# Patient Record
Sex: FEMALE | Race: WHITE | Employment: OTHER | URBAN - METROPOLITAN AREA
[De-identification: names, ages, dates, MRNs, and addresses within clinical notes are randomized per-mention and may not be internally consistent; named-entity substitution may affect disease eponyms.]

---

## 2017-02-14 ENCOUNTER — HOSPITAL ENCOUNTER (OUTPATIENT)
Dept: LAB | Age: 77
Discharge: HOME | End: 2017-02-14

## 2017-02-14 LAB
FLUAV AG NPH QL IA: NEGATIVE
FLUBV AG NPH QL IA: NEGATIVE

## 2017-02-14 PROCEDURE — 87804 INFLUENZA ASSAY W/OPTIC: CPT | Performed by: FAMILY MEDICINE

## 2017-04-05 PROBLEM — E03.9 HYPOACTIVE THYROID: Status: ACTIVE | Noted: 2017-04-05

## 2017-04-05 PROBLEM — E11.42 DIABETIC POLYNEUROPATHY (HCC): Status: ACTIVE | Noted: 2017-02-01

## 2017-04-05 PROBLEM — K21.9 GASTROESOPHAGEAL REFLUX DISEASE: Status: ACTIVE | Noted: 2017-04-05

## 2017-04-05 PROBLEM — N39.0 ESCHERICHIA COLI URINARY TRACT INFECTION: Status: ACTIVE | Noted: 2017-02-01

## 2017-04-05 PROBLEM — G89.4 CHRONIC PAIN ASSOCIATED WITH SIGNIFICANT PSYCHOSOCIAL DYSFUNCTION: Status: ACTIVE | Noted: 2017-01-23

## 2017-04-05 PROBLEM — M15.0 PRIMARY GENERALIZED HYPERTROPHIC OSTEOARTHROSIS: Status: ACTIVE | Noted: 2017-01-23

## 2017-04-05 PROBLEM — E66.9 ADIPOSITY: Status: ACTIVE | Noted: 2017-04-05

## 2017-04-05 PROBLEM — E11.9 TYPE 2 DIABETES MELLITUS (HCC): Status: ACTIVE | Noted: 2017-04-05

## 2017-04-05 PROBLEM — D64.9 CHRONIC ANEMIA: Status: ACTIVE | Noted: 2017-04-05

## 2017-04-05 PROBLEM — E87.6 HYPOKALEMIA: Status: ACTIVE | Noted: 2017-02-01

## 2017-04-05 PROBLEM — L89.92 PRESSURE ULCER, STAGE 2 (HCC): Status: ACTIVE | Noted: 2017-02-03

## 2017-04-05 PROBLEM — E83.42 HYPOMAGNESEMIA: Status: ACTIVE | Noted: 2017-02-01

## 2017-04-05 PROBLEM — B96.20 ESCHERICHIA COLI URINARY TRACT INFECTION: Status: ACTIVE | Noted: 2017-02-01

## 2017-05-04 PROBLEM — I10 ESSENTIAL HYPERTENSION: Status: ACTIVE | Noted: 2017-05-04

## 2017-05-04 PROBLEM — R79.89 ELEVATED BRAIN NATRIURETIC PEPTIDE (BNP) LEVEL: Status: ACTIVE | Noted: 2017-05-04

## 2017-05-04 PROBLEM — I44.7 LBBB (LEFT BUNDLE BRANCH BLOCK): Status: ACTIVE | Noted: 2017-05-04

## 2017-05-31 PROBLEM — I11.9 LVH (LEFT VENTRICULAR HYPERTROPHY) DUE TO HYPERTENSIVE DISEASE: Status: ACTIVE | Noted: 2017-05-31

## 2017-05-31 PROBLEM — I25.10 CORONARY ARTERY DISEASE INVOLVING NATIVE CORONARY ARTERY OF NATIVE HEART WITHOUT ANGINA PECTORIS: Status: ACTIVE | Noted: 2017-05-31

## 2017-10-02 PROBLEM — E78.2 HYPERLIPEMIA, MIXED: Status: ACTIVE | Noted: 2017-10-02

## 2018-07-26 PROBLEM — J40 BRONCHITIS: Status: ACTIVE | Noted: 2018-07-26

## 2019-04-30 PROBLEM — E66.01 CLASS 2 SEVERE OBESITY DUE TO EXCESS CALORIES WITH SERIOUS COMORBIDITY IN ADULT (HCC): Status: ACTIVE | Noted: 2017-04-05

## 2021-03-08 PROBLEM — R29.898 WEAKNESS OF BOTH LEGS: Status: ACTIVE | Noted: 2021-03-08

## 2021-03-08 PROBLEM — I35.8 AORTIC VALVE SCLEROSIS: Status: ACTIVE | Noted: 2021-03-08

## 2021-03-08 PROBLEM — R53.81 DEBILITY: Status: ACTIVE | Noted: 2021-03-08

## 2021-03-09 ENCOUNTER — HOME HEALTH ADMISSION (OUTPATIENT)
Dept: HOME HEALTH SERVICES | Facility: HOME HEALTH | Age: 81
End: 2021-03-09
Payer: MEDICARE

## 2021-03-12 ENCOUNTER — HOME CARE VISIT (OUTPATIENT)
Dept: SCHEDULING | Facility: HOME HEALTH | Age: 81
End: 2021-03-12
Payer: MEDICARE

## 2021-03-12 VITALS
SYSTOLIC BLOOD PRESSURE: 160 MMHG | DIASTOLIC BLOOD PRESSURE: 80 MMHG | HEART RATE: 74 BPM | OXYGEN SATURATION: 98 % | RESPIRATION RATE: 16 BRPM | TEMPERATURE: 98.2 F

## 2021-03-12 PROCEDURE — 3331090001 HH PPS REVENUE CREDIT

## 2021-03-12 PROCEDURE — 400018 HH-NO PAY CLAIM PROCEDURE

## 2021-03-12 PROCEDURE — 3331090002 HH PPS REVENUE DEBIT

## 2021-03-12 PROCEDURE — G0162 HHC RN E&M PLAN SVS, 15 MIN: HCPCS

## 2021-03-12 PROCEDURE — 400013 HH SOC

## 2021-03-13 PROCEDURE — 3331090001 HH PPS REVENUE CREDIT

## 2021-03-13 PROCEDURE — 3331090002 HH PPS REVENUE DEBIT

## 2021-03-14 PROCEDURE — 3331090001 HH PPS REVENUE CREDIT

## 2021-03-14 PROCEDURE — 3331090002 HH PPS REVENUE DEBIT

## 2021-03-15 ENCOUNTER — HOME CARE VISIT (OUTPATIENT)
Dept: HOME HEALTH SERVICES | Facility: HOME HEALTH | Age: 81
End: 2021-03-15
Payer: MEDICARE

## 2021-03-15 PROCEDURE — 3331090001 HH PPS REVENUE CREDIT

## 2021-03-15 PROCEDURE — 3331090002 HH PPS REVENUE DEBIT

## 2021-03-16 ENCOUNTER — HOME CARE VISIT (OUTPATIENT)
Dept: SCHEDULING | Facility: HOME HEALTH | Age: 81
End: 2021-03-16
Payer: MEDICARE

## 2021-03-16 VITALS
TEMPERATURE: 97.8 F | HEART RATE: 89 BPM | DIASTOLIC BLOOD PRESSURE: 90 MMHG | OXYGEN SATURATION: 98 % | RESPIRATION RATE: 18 BRPM | SYSTOLIC BLOOD PRESSURE: 164 MMHG

## 2021-03-16 VITALS
RESPIRATION RATE: 16 BRPM | DIASTOLIC BLOOD PRESSURE: 84 MMHG | HEART RATE: 68 BPM | OXYGEN SATURATION: 99 % | SYSTOLIC BLOOD PRESSURE: 158 MMHG | TEMPERATURE: 98.9 F

## 2021-03-16 VITALS
TEMPERATURE: 97.1 F | HEART RATE: 75 BPM | SYSTOLIC BLOOD PRESSURE: 160 MMHG | DIASTOLIC BLOOD PRESSURE: 90 MMHG | RESPIRATION RATE: 18 BRPM | OXYGEN SATURATION: 98 %

## 2021-03-16 PROCEDURE — G0299 HHS/HOSPICE OF RN EA 15 MIN: HCPCS

## 2021-03-16 PROCEDURE — 3331090002 HH PPS REVENUE DEBIT

## 2021-03-16 PROCEDURE — 3331090001 HH PPS REVENUE CREDIT

## 2021-03-16 PROCEDURE — G0152 HHCP-SERV OF OT,EA 15 MIN: HCPCS

## 2021-03-16 PROCEDURE — G0151 HHCP-SERV OF PT,EA 15 MIN: HCPCS

## 2021-03-17 PROCEDURE — 3331090002 HH PPS REVENUE DEBIT

## 2021-03-17 PROCEDURE — 3331090001 HH PPS REVENUE CREDIT

## 2021-03-18 ENCOUNTER — HOME CARE VISIT (OUTPATIENT)
Dept: SCHEDULING | Facility: HOME HEALTH | Age: 81
End: 2021-03-18
Payer: MEDICARE

## 2021-03-18 PROCEDURE — 3331090001 HH PPS REVENUE CREDIT

## 2021-03-18 PROCEDURE — 3331090002 HH PPS REVENUE DEBIT

## 2021-03-19 ENCOUNTER — HOME CARE VISIT (OUTPATIENT)
Dept: HOME HEALTH SERVICES | Facility: HOME HEALTH | Age: 81
End: 2021-03-19
Payer: MEDICARE

## 2021-03-19 ENCOUNTER — HOME CARE VISIT (OUTPATIENT)
Dept: SCHEDULING | Facility: HOME HEALTH | Age: 81
End: 2021-03-19
Payer: MEDICARE

## 2021-03-19 VITALS
OXYGEN SATURATION: 100 % | SYSTOLIC BLOOD PRESSURE: 150 MMHG | TEMPERATURE: 97.5 F | RESPIRATION RATE: 17 BRPM | HEART RATE: 70 BPM | DIASTOLIC BLOOD PRESSURE: 90 MMHG

## 2021-03-19 PROCEDURE — 3331090001 HH PPS REVENUE CREDIT

## 2021-03-19 PROCEDURE — G0157 HHC PT ASSISTANT EA 15: HCPCS

## 2021-03-19 PROCEDURE — 3331090002 HH PPS REVENUE DEBIT

## 2021-03-20 PROCEDURE — 3331090002 HH PPS REVENUE DEBIT

## 2021-03-20 PROCEDURE — 3331090001 HH PPS REVENUE CREDIT

## 2021-03-21 PROCEDURE — 3331090002 HH PPS REVENUE DEBIT

## 2021-03-21 PROCEDURE — 3331090001 HH PPS REVENUE CREDIT

## 2021-03-22 ENCOUNTER — APPOINTMENT (OUTPATIENT)
Dept: GENERAL RADIOLOGY | Age: 81
End: 2021-03-22
Attending: EMERGENCY MEDICINE
Payer: MEDICARE

## 2021-03-22 ENCOUNTER — HOSPITAL ENCOUNTER (EMERGENCY)
Age: 81
Discharge: HOME | End: 2021-03-22
Attending: EMERGENCY MEDICINE
Payer: MEDICARE

## 2021-03-22 ENCOUNTER — HOME CARE VISIT (OUTPATIENT)
Dept: SCHEDULING | Facility: HOME HEALTH | Age: 81
End: 2021-03-22
Payer: MEDICARE

## 2021-03-22 VITALS
OXYGEN SATURATION: 96 % | SYSTOLIC BLOOD PRESSURE: 179 MMHG | HEIGHT: 61 IN | DIASTOLIC BLOOD PRESSURE: 83 MMHG | TEMPERATURE: 97.7 F | BODY MASS INDEX: 38.89 KG/M2 | WEIGHT: 206 LBS | HEART RATE: 89 BPM | RESPIRATION RATE: 18 BRPM

## 2021-03-22 VITALS
TEMPERATURE: 98.9 F | DIASTOLIC BLOOD PRESSURE: 90 MMHG | SYSTOLIC BLOOD PRESSURE: 192 MMHG | HEART RATE: 84 BPM | OXYGEN SATURATION: 100 %

## 2021-03-22 DIAGNOSIS — I10 ESSENTIAL HYPERTENSION: Primary | ICD-10-CM

## 2021-03-22 LAB
ALBUMIN SERPL-MCNC: 3.3 G/DL (ref 3.2–4.6)
ALBUMIN/GLOB SERPL: 0.8 {RATIO} (ref 1.2–3.5)
ALP SERPL-CCNC: 136 U/L (ref 50–136)
ALT SERPL-CCNC: 14 U/L (ref 12–65)
ANION GAP SERPL CALC-SCNC: 8 MMOL/L (ref 7–16)
AST SERPL-CCNC: 22 U/L (ref 15–37)
ATRIAL RATE: 80 BPM
BILIRUB SERPL-MCNC: 0.5 MG/DL (ref 0.2–1.1)
BUN SERPL-MCNC: 12 MG/DL (ref 8–23)
CALCIUM SERPL-MCNC: 9.6 MG/DL (ref 8.3–10.4)
CALCULATED P AXIS, ECG09: 63 DEGREES
CALCULATED R AXIS, ECG10: 12 DEGREES
CALCULATED T AXIS, ECG11: 139 DEGREES
CHLORIDE SERPL-SCNC: 108 MMOL/L (ref 98–107)
CO2 SERPL-SCNC: 24 MMOL/L (ref 21–32)
CREAT SERPL-MCNC: 0.97 MG/DL (ref 0.6–1)
DIAGNOSIS, 93000: NORMAL
ERYTHROCYTE [DISTWIDTH] IN BLOOD BY AUTOMATED COUNT: 14.7 % (ref 11.9–14.6)
GLOBULIN SER CALC-MCNC: 4.4 G/DL (ref 2.3–3.5)
GLUCOSE SERPL-MCNC: 98 MG/DL (ref 65–100)
HCT VFR BLD AUTO: 38 % (ref 35.8–46.3)
HGB BLD-MCNC: 12.3 G/DL (ref 11.7–15.4)
MAGNESIUM SERPL-MCNC: 2 MG/DL (ref 1.8–2.4)
MCH RBC QN AUTO: 31 PG (ref 26.1–32.9)
MCHC RBC AUTO-ENTMCNC: 32.4 G/DL (ref 31.4–35)
MCV RBC AUTO: 95.7 FL (ref 79.6–97.8)
NRBC # BLD: 0 K/UL (ref 0–0.2)
P-R INTERVAL, ECG05: 180 MS
PLATELET # BLD AUTO: 262 K/UL (ref 150–450)
PMV BLD AUTO: 9.9 FL (ref 9.4–12.3)
POTASSIUM SERPL-SCNC: 4.8 MMOL/L (ref 3.5–5.1)
PROT SERPL-MCNC: 7.7 G/DL (ref 6.3–8.2)
Q-T INTERVAL, ECG07: 426 MS
QRS DURATION, ECG06: 156 MS
QTC CALCULATION (BEZET), ECG08: 491 MS
RBC # BLD AUTO: 3.97 M/UL (ref 4.05–5.2)
SODIUM SERPL-SCNC: 140 MMOL/L (ref 136–145)
TSH SERPL DL<=0.005 MIU/L-ACNC: 3.97 UIU/ML (ref 0.36–3.74)
VENTRICULAR RATE, ECG03: 80 BPM
WBC # BLD AUTO: 13.9 K/UL (ref 4.3–11.1)

## 2021-03-22 PROCEDURE — 3331090002 HH PPS REVENUE DEBIT

## 2021-03-22 PROCEDURE — G0158 HHC OT ASSISTANT EA 15: HCPCS

## 2021-03-22 PROCEDURE — 80053 COMPREHEN METABOLIC PANEL: CPT

## 2021-03-22 PROCEDURE — 71045 X-RAY EXAM CHEST 1 VIEW: CPT

## 2021-03-22 PROCEDURE — 83735 ASSAY OF MAGNESIUM: CPT

## 2021-03-22 PROCEDURE — 84443 ASSAY THYROID STIM HORMONE: CPT

## 2021-03-22 PROCEDURE — 85027 COMPLETE CBC AUTOMATED: CPT

## 2021-03-22 PROCEDURE — 93005 ELECTROCARDIOGRAM TRACING: CPT | Performed by: EMERGENCY MEDICINE

## 2021-03-22 PROCEDURE — 99285 EMERGENCY DEPT VISIT HI MDM: CPT

## 2021-03-22 PROCEDURE — 3331090001 HH PPS REVENUE CREDIT

## 2021-03-22 PROCEDURE — 74011250637 HC RX REV CODE- 250/637: Performed by: EMERGENCY MEDICINE

## 2021-03-22 PROCEDURE — 81003 URINALYSIS AUTO W/O SCOPE: CPT

## 2021-03-22 RX ORDER — METOPROLOL TARTRATE 50 MG/1
50 TABLET ORAL ONCE
Status: COMPLETED | OUTPATIENT
Start: 2021-03-22 | End: 2021-03-22

## 2021-03-22 RX ADMIN — METOPROLOL TARTRATE 50 MG: 50 TABLET, FILM COATED ORAL at 18:33

## 2021-03-22 NOTE — ED TRIAGE NOTES
Patient arrived via EMS with c/o HTN. Home health RN reports /90. Pt has not taken morning BP medications. Pt has since taken medications. EMS reports /114. Pt has not complaints at this time.

## 2021-03-22 NOTE — ED NOTES
I have reviewed discharge instructions with the patient. The patient verbalized understanding. Patient left ED via Discharge Method: wheelchair to Home with (insert name of family/friend, self, transport Niece). Opportunity for questions and clarification provided. Patient given 0 scripts. To continue your aftercare when you leave the hospital, you may receive an automated call from our care team to check in on how you are doing.  This is a free service and part of our promise to provide the best care and service to meet your aftercare needs. \" If you have questions, or wish to unsubscribe from this service please call [phone].  Thank you for Choosing our [facility] Emergency Department.

## 2021-03-22 NOTE — ED PROVIDER NOTES
25-year-old female presents to the ER with concerns over elevated blood pressure readings    Patient states that home health aide come to the house to check on her today and set up some medical equipment. They checked her blood pressure and it was elevated  Patient had not yet taken her morning blood pressure medications. She took her medications and then assisted the home health agent with these chores. Blood pressure was rechecked and did not improve    EMS was summoned to the house  Their blood pressure readings were actually higher than what the home health nurse had gotten    Patient had no symptoms and felt like she was having a normal morning. She had no chest pain, no shortness of breath no headaches dizziness or lightheadedness. She had no back pain no abdominal pain  No change in her urine output. She denied any hematuria      The history is provided by the patient and a relative. Hypertension   This is a chronic problem. The current episode started 6 to 12 hours ago. The problem has been gradually improving. Pertinent negatives include no chest pain, no orthopnea, no palpitations, no confusion, no malaise/fatigue, no blurred vision, no headaches, no neck pain, no dizziness, no shortness of breath, no nausea and no vomiting. There are no associated agents to hypertension. Risk factors include obesity, a sedentary lifestyle and hypertension. Past Medical History:   Diagnosis Date    Chronic anemia     Chronic anemia     Chronic back pain     DM (diabetes mellitus) (HCC)     GERD (gastroesophageal reflux disease)     Gout     Hyperlipidemia     Hypothyroidism     Hypothyroidism     Obesity        Past Surgical History:   Procedure Laterality Date    HX HEART CATHETERIZATION           No family history on file.     Social History     Socioeconomic History    Marital status:      Spouse name: Not on file    Number of children: Not on file    Years of education: Not on file  Highest education level: Not on file   Occupational History    Not on file   Social Needs    Financial resource strain: Not on file    Food insecurity     Worry: Not on file     Inability: Not on file    Transportation needs     Medical: Not on file     Non-medical: Not on file   Tobacco Use    Smoking status: Never Smoker    Smokeless tobacco: Never Used   Substance and Sexual Activity    Alcohol use: Not on file    Drug use: Not on file    Sexual activity: Not on file   Lifestyle    Physical activity     Days per week: Not on file     Minutes per session: Not on file    Stress: Not on file   Relationships    Social connections     Talks on phone: Not on file     Gets together: Not on file     Attends Alevism service: Not on file     Active member of club or organization: Not on file     Attends meetings of clubs or organizations: Not on file     Relationship status: Not on file    Intimate partner violence     Fear of current or ex partner: Not on file     Emotionally abused: Not on file     Physically abused: Not on file     Forced sexual activity: Not on file   Other Topics Concern    Not on file   Social History Narrative    Not on file         ALLERGIES: Amoxicillin-pot clavulanate, Penicillins, and Sulfamethoxazole-trimethoprim    Review of Systems   Constitutional: Negative for chills, fever and malaise/fatigue. HENT: Negative for congestion, ear pain and rhinorrhea. Eyes: Negative for blurred vision, photophobia and discharge. Respiratory: Negative for cough and shortness of breath. Cardiovascular: Negative for chest pain, palpitations and orthopnea. Gastrointestinal: Negative for abdominal pain, constipation, diarrhea, nausea and vomiting. Endocrine: Negative for cold intolerance and heat intolerance. Genitourinary: Negative for dysuria and flank pain. Musculoskeletal: Positive for arthralgias. Negative for myalgias and neck pain. Skin: Negative for rash and wound.   Allergic/Immunologic: Negative for environmental allergies and food allergies.   Neurological: Negative for dizziness, syncope and headaches.   Hematological: Negative for adenopathy. Does not bruise/bleed easily.   Psychiatric/Behavioral: Negative for confusion and dysphoric mood. The patient is not nervous/anxious.    All other systems reviewed and are negative.      Vitals:    03/22/21 1424   BP: (!) 146/81   Pulse: 83   Resp: 16   Temp: 97.7 °F (36.5 °C)   SpO2: 99%   Weight: 93.4 kg (206 lb)   Height: 5' 1\" (1.549 m)            Physical Exam  Vitals signs and nursing note reviewed.   Constitutional:       General: She is in acute distress.      Appearance: Normal appearance. She is well-developed. She is obese.   HENT:      Head: Normocephalic and atraumatic.      Right Ear: External ear normal.      Left Ear: External ear normal.      Mouth/Throat:      Pharynx: No oropharyngeal exudate or posterior oropharyngeal erythema.   Eyes:      Extraocular Movements: Extraocular movements intact.      Conjunctiva/sclera: Conjunctivae normal.      Pupils: Pupils are equal, round, and reactive to light.   Neck:      Musculoskeletal: Normal range of motion and neck supple.      Vascular: No JVD.   Cardiovascular:      Rate and Rhythm: Normal rate and regular rhythm.      Pulses: Normal pulses.      Heart sounds: Normal heart sounds. No murmur. No friction rub. No gallop.    Pulmonary:      Effort: Pulmonary effort is normal.      Breath sounds: Normal breath sounds.   Abdominal:      General: Bowel sounds are normal. There is no distension.      Palpations: Abdomen is soft. There is no mass.      Tenderness: There is no abdominal tenderness.   Musculoskeletal: Normal range of motion.         General: No deformity.   Skin:     General: Skin is warm and dry.      Capillary Refill: Capillary refill takes less than 2 seconds.      Findings: No rash.   Neurological:      General: No focal deficit present.      Mental Status:  She is alert and oriented to person, place, and time. Cranial Nerves: No cranial nerve deficit. Sensory: No sensory deficit. Gait: Gait normal.   Psychiatric:         Mood and Affect: Mood normal.         Speech: Speech normal.         Behavior: Behavior normal.         Thought Content: Thought content normal.         Judgment: Judgment normal.          MDM  Number of Diagnoses or Management Options  Essential hypertension: new and requires workup  Diagnosis management comments: 13-year-old female with a history of hypertension  Currently on Toprol, Norvasc, Lasix    Patient's blood pressure has crept up and the patient has remained asymptomatic  We will give an extra dose of Lopressor orally    Patient referred back to her primary care doctor and cardiologist for any further medication adjustments  Patient given precautions including discussion of symptoms which should mandate more immediate work-up if her blood pressure remains elevated       Amount and/or Complexity of Data Reviewed  Clinical lab tests: ordered and reviewed  Tests in the radiology section of CPT®: ordered and reviewed  Tests in the medicine section of CPT®: ordered and reviewed  Decide to obtain previous medical records or to obtain history from someone other than the patient: yes  Obtain history from someone other than the patient: yes  Review and summarize past medical records: yes  Independent visualization of images, tracings, or specimens: yes    Risk of Complications, Morbidity, and/or Mortality  Presenting problems: moderate  Diagnostic procedures: moderate  Management options: moderate  General comments: Elements of this note have been dictated via voice recognition software. Text and phrases may be limited by the accuracy of the software. The chart has been reviewed, but errors may still be present.       Patient Progress  Patient progress: improved         EKG    Date/Time: 3/22/2021 4:24 PM  Performed by: Zainab Harmon MD HO  Authorized by: Murphy Shahid MD     ECG reviewed by ED Physician in the absence of a cardiologist: yes    Previous ECG:     Previous ECG:  Compared to current    Similarity:  No change  Interpretation:     Interpretation: abnormal    Rate:     ECG rate:  80    ECG rate assessment: normal    Rhythm:     Rhythm: sinus rhythm    Ectopy:     Ectopy: none    QRS:     QRS axis:  Left    QRS intervals:   Wide  Conduction:     Conduction: abnormal      Abnormal conduction: complete LBBB    ST segments:     ST segments:  Non-specific  T waves:     T waves: normal    Comments:      No acute ischemic changes  No interval change from prior tracing

## 2021-03-22 NOTE — DISCHARGE INSTRUCTIONS
Call your doctor in the morning to discuss any changes to your blood pressure medication  Get plenty of rest  Continue to drink plenty of fluids  Continue to monitor your blood sugar carefully  Return to ER for any worsening symptoms or new problems which may arise

## 2021-03-23 ENCOUNTER — HOME CARE VISIT (OUTPATIENT)
Dept: SCHEDULING | Facility: HOME HEALTH | Age: 81
End: 2021-03-23
Payer: MEDICARE

## 2021-03-23 PROCEDURE — 3331090002 HH PPS REVENUE DEBIT

## 2021-03-23 PROCEDURE — 3331090001 HH PPS REVENUE CREDIT

## 2021-03-23 PROCEDURE — G0299 HHS/HOSPICE OF RN EA 15 MIN: HCPCS

## 2021-03-24 ENCOUNTER — HOME CARE VISIT (OUTPATIENT)
Dept: SCHEDULING | Facility: HOME HEALTH | Age: 81
End: 2021-03-24
Payer: MEDICARE

## 2021-03-24 ENCOUNTER — HOME CARE VISIT (OUTPATIENT)
Dept: HOME HEALTH SERVICES | Facility: HOME HEALTH | Age: 81
End: 2021-03-24
Payer: MEDICARE

## 2021-03-24 VITALS
RESPIRATION RATE: 18 BRPM | HEART RATE: 65 BPM | TEMPERATURE: 97 F | OXYGEN SATURATION: 99 % | SYSTOLIC BLOOD PRESSURE: 150 MMHG | DIASTOLIC BLOOD PRESSURE: 62 MMHG

## 2021-03-24 VITALS
HEART RATE: 72 BPM | SYSTOLIC BLOOD PRESSURE: 140 MMHG | TEMPERATURE: 98.1 F | DIASTOLIC BLOOD PRESSURE: 74 MMHG | OXYGEN SATURATION: 100 %

## 2021-03-24 VITALS
DIASTOLIC BLOOD PRESSURE: 84 MMHG | TEMPERATURE: 98 F | RESPIRATION RATE: 17 BRPM | OXYGEN SATURATION: 98 % | HEART RATE: 72 BPM | SYSTOLIC BLOOD PRESSURE: 144 MMHG

## 2021-03-24 PROCEDURE — 3331090002 HH PPS REVENUE DEBIT

## 2021-03-24 PROCEDURE — 3331090001 HH PPS REVENUE CREDIT

## 2021-03-24 PROCEDURE — G0158 HHC OT ASSISTANT EA 15: HCPCS

## 2021-03-24 PROCEDURE — G0157 HHC PT ASSISTANT EA 15: HCPCS

## 2021-03-25 ENCOUNTER — HOME CARE VISIT (OUTPATIENT)
Dept: SCHEDULING | Facility: HOME HEALTH | Age: 81
End: 2021-03-25
Payer: MEDICARE

## 2021-03-25 ENCOUNTER — HOME CARE VISIT (OUTPATIENT)
Dept: HOME HEALTH SERVICES | Facility: HOME HEALTH | Age: 81
End: 2021-03-25
Payer: MEDICARE

## 2021-03-25 VITALS
HEART RATE: 76 BPM | OXYGEN SATURATION: 96 % | RESPIRATION RATE: 17 BRPM | DIASTOLIC BLOOD PRESSURE: 68 MMHG | TEMPERATURE: 98.2 F | SYSTOLIC BLOOD PRESSURE: 148 MMHG

## 2021-03-25 VITALS
OXYGEN SATURATION: 98 % | DIASTOLIC BLOOD PRESSURE: 70 MMHG | SYSTOLIC BLOOD PRESSURE: 160 MMHG | HEART RATE: 74 BPM | TEMPERATURE: 97 F | RESPIRATION RATE: 18 BRPM

## 2021-03-25 PROCEDURE — G0157 HHC PT ASSISTANT EA 15: HCPCS

## 2021-03-25 PROCEDURE — 3331090001 HH PPS REVENUE CREDIT

## 2021-03-25 PROCEDURE — G0299 HHS/HOSPICE OF RN EA 15 MIN: HCPCS

## 2021-03-25 PROCEDURE — 3331090002 HH PPS REVENUE DEBIT

## 2021-03-26 PROCEDURE — 3331090002 HH PPS REVENUE DEBIT

## 2021-03-26 PROCEDURE — 3331090001 HH PPS REVENUE CREDIT

## 2021-03-27 PROCEDURE — 3331090002 HH PPS REVENUE DEBIT

## 2021-03-27 PROCEDURE — 3331090001 HH PPS REVENUE CREDIT

## 2021-03-28 PROCEDURE — 3331090002 HH PPS REVENUE DEBIT

## 2021-03-28 PROCEDURE — 3331090001 HH PPS REVENUE CREDIT

## 2021-03-29 PROCEDURE — 3331090002 HH PPS REVENUE DEBIT

## 2021-03-29 PROCEDURE — 3331090001 HH PPS REVENUE CREDIT

## 2021-03-30 ENCOUNTER — HOME CARE VISIT (OUTPATIENT)
Dept: SCHEDULING | Facility: HOME HEALTH | Age: 81
End: 2021-03-30
Payer: MEDICARE

## 2021-03-30 VITALS
TEMPERATURE: 98.4 F | OXYGEN SATURATION: 100 % | DIASTOLIC BLOOD PRESSURE: 72 MMHG | HEART RATE: 68 BPM | SYSTOLIC BLOOD PRESSURE: 140 MMHG

## 2021-03-30 VITALS
DIASTOLIC BLOOD PRESSURE: 72 MMHG | OXYGEN SATURATION: 100 % | SYSTOLIC BLOOD PRESSURE: 140 MMHG | HEART RATE: 68 BPM | RESPIRATION RATE: 17 BRPM | TEMPERATURE: 98.4 F

## 2021-03-30 VITALS
OXYGEN SATURATION: 98 % | TEMPERATURE: 97.9 F | SYSTOLIC BLOOD PRESSURE: 140 MMHG | DIASTOLIC BLOOD PRESSURE: 80 MMHG | RESPIRATION RATE: 17 BRPM | HEART RATE: 68 BPM

## 2021-03-30 PROCEDURE — G0157 HHC PT ASSISTANT EA 15: HCPCS

## 2021-03-30 PROCEDURE — G0158 HHC OT ASSISTANT EA 15: HCPCS

## 2021-03-30 PROCEDURE — 3331090001 HH PPS REVENUE CREDIT

## 2021-03-30 PROCEDURE — G0299 HHS/HOSPICE OF RN EA 15 MIN: HCPCS

## 2021-03-30 PROCEDURE — 3331090002 HH PPS REVENUE DEBIT

## 2021-03-31 PROCEDURE — 3331090001 HH PPS REVENUE CREDIT

## 2021-03-31 PROCEDURE — 3331090002 HH PPS REVENUE DEBIT

## 2021-04-01 ENCOUNTER — HOME CARE VISIT (OUTPATIENT)
Dept: SCHEDULING | Facility: HOME HEALTH | Age: 81
End: 2021-04-01
Payer: MEDICARE

## 2021-04-01 VITALS
DIASTOLIC BLOOD PRESSURE: 68 MMHG | SYSTOLIC BLOOD PRESSURE: 142 MMHG | OXYGEN SATURATION: 98 % | HEART RATE: 68 BPM | RESPIRATION RATE: 17 BRPM | TEMPERATURE: 98.7 F

## 2021-04-01 PROCEDURE — G0152 HHCP-SERV OF OT,EA 15 MIN: HCPCS

## 2021-04-01 PROCEDURE — 3331090001 HH PPS REVENUE CREDIT

## 2021-04-01 PROCEDURE — 3331090002 HH PPS REVENUE DEBIT

## 2021-04-01 PROCEDURE — G0157 HHC PT ASSISTANT EA 15: HCPCS

## 2021-04-02 ENCOUNTER — HOME CARE VISIT (OUTPATIENT)
Dept: HOME HEALTH SERVICES | Facility: HOME HEALTH | Age: 81
End: 2021-04-02
Payer: MEDICARE

## 2021-04-02 PROCEDURE — 3331090001 HH PPS REVENUE CREDIT

## 2021-04-02 PROCEDURE — 3331090002 HH PPS REVENUE DEBIT

## 2021-04-03 PROCEDURE — 3331090002 HH PPS REVENUE DEBIT

## 2021-04-03 PROCEDURE — 3331090001 HH PPS REVENUE CREDIT

## 2021-04-04 PROCEDURE — 3331090002 HH PPS REVENUE DEBIT

## 2021-04-04 PROCEDURE — 3331090001 HH PPS REVENUE CREDIT

## 2021-04-05 ENCOUNTER — HOME CARE VISIT (OUTPATIENT)
Dept: SCHEDULING | Facility: HOME HEALTH | Age: 81
End: 2021-04-05
Payer: MEDICARE

## 2021-04-05 VITALS
OXYGEN SATURATION: 98 % | TEMPERATURE: 98.7 F | DIASTOLIC BLOOD PRESSURE: 68 MMHG | SYSTOLIC BLOOD PRESSURE: 142 MMHG | RESPIRATION RATE: 17 BRPM | HEART RATE: 68 BPM

## 2021-04-05 VITALS
RESPIRATION RATE: 18 BRPM | TEMPERATURE: 97.8 F | HEART RATE: 78 BPM | OXYGEN SATURATION: 98 % | DIASTOLIC BLOOD PRESSURE: 78 MMHG | SYSTOLIC BLOOD PRESSURE: 148 MMHG

## 2021-04-05 PROCEDURE — 3331090002 HH PPS REVENUE DEBIT

## 2021-04-05 PROCEDURE — 3331090001 HH PPS REVENUE CREDIT

## 2021-04-05 PROCEDURE — G0151 HHCP-SERV OF PT,EA 15 MIN: HCPCS

## 2021-04-06 ENCOUNTER — HOME CARE VISIT (OUTPATIENT)
Dept: SCHEDULING | Facility: HOME HEALTH | Age: 81
End: 2021-04-06
Payer: MEDICARE

## 2021-04-06 VITALS
OXYGEN SATURATION: 96 % | RESPIRATION RATE: 20 BRPM | DIASTOLIC BLOOD PRESSURE: 78 MMHG | HEART RATE: 69 BPM | TEMPERATURE: 98.4 F | SYSTOLIC BLOOD PRESSURE: 148 MMHG

## 2021-04-06 PROCEDURE — 3331090002 HH PPS REVENUE DEBIT

## 2021-04-06 PROCEDURE — 3331090001 HH PPS REVENUE CREDIT

## 2021-04-06 PROCEDURE — G0299 HHS/HOSPICE OF RN EA 15 MIN: HCPCS

## 2021-04-07 PROCEDURE — 3331090002 HH PPS REVENUE DEBIT

## 2021-04-07 PROCEDURE — 3331090001 HH PPS REVENUE CREDIT

## 2021-04-08 ENCOUNTER — HOME CARE VISIT (OUTPATIENT)
Dept: SCHEDULING | Facility: HOME HEALTH | Age: 81
End: 2021-04-08
Payer: MEDICARE

## 2021-04-08 VITALS
OXYGEN SATURATION: 100 % | SYSTOLIC BLOOD PRESSURE: 150 MMHG | DIASTOLIC BLOOD PRESSURE: 70 MMHG | TEMPERATURE: 98.5 F | HEART RATE: 68 BPM

## 2021-04-08 PROCEDURE — G0158 HHC OT ASSISTANT EA 15: HCPCS

## 2021-04-08 PROCEDURE — 3331090002 HH PPS REVENUE DEBIT

## 2021-04-08 PROCEDURE — 3331090001 HH PPS REVENUE CREDIT

## 2021-04-09 PROCEDURE — 3331090001 HH PPS REVENUE CREDIT

## 2021-04-09 PROCEDURE — 3331090002 HH PPS REVENUE DEBIT

## 2021-04-10 PROCEDURE — 3331090002 HH PPS REVENUE DEBIT

## 2021-04-10 PROCEDURE — 3331090001 HH PPS REVENUE CREDIT

## 2021-04-10 PROCEDURE — 3331090003 HH PPS REVENUE ADJ

## 2021-04-11 PROCEDURE — 400018 HH-NO PAY CLAIM PROCEDURE

## 2021-04-11 PROCEDURE — 3331090002 HH PPS REVENUE DEBIT

## 2021-04-11 PROCEDURE — 3331090001 HH PPS REVENUE CREDIT

## 2021-04-12 ENCOUNTER — HOME CARE VISIT (OUTPATIENT)
Dept: SCHEDULING | Facility: HOME HEALTH | Age: 81
End: 2021-04-12
Payer: MEDICARE

## 2021-04-12 VITALS
DIASTOLIC BLOOD PRESSURE: 66 MMHG | HEART RATE: 64 BPM | TEMPERATURE: 98.4 F | OXYGEN SATURATION: 100 % | SYSTOLIC BLOOD PRESSURE: 144 MMHG

## 2021-04-12 PROCEDURE — 3331090001 HH PPS REVENUE CREDIT

## 2021-04-12 PROCEDURE — G0158 HHC OT ASSISTANT EA 15: HCPCS

## 2021-04-12 PROCEDURE — 3331090002 HH PPS REVENUE DEBIT

## 2021-04-12 PROCEDURE — 400013 HH SOC

## 2021-04-13 PROCEDURE — 3331090002 HH PPS REVENUE DEBIT

## 2021-04-13 PROCEDURE — 3331090001 HH PPS REVENUE CREDIT

## 2021-04-14 PROCEDURE — 3331090001 HH PPS REVENUE CREDIT

## 2021-04-14 PROCEDURE — 3331090002 HH PPS REVENUE DEBIT

## 2021-04-15 PROCEDURE — 3331090002 HH PPS REVENUE DEBIT

## 2021-04-15 PROCEDURE — 3331090001 HH PPS REVENUE CREDIT

## 2021-04-16 PROCEDURE — 3331090002 HH PPS REVENUE DEBIT

## 2021-04-16 PROCEDURE — 3331090001 HH PPS REVENUE CREDIT

## 2021-04-17 PROCEDURE — 3331090002 HH PPS REVENUE DEBIT

## 2021-04-17 PROCEDURE — 3331090001 HH PPS REVENUE CREDIT

## 2021-04-18 PROCEDURE — 3331090001 HH PPS REVENUE CREDIT

## 2021-04-18 PROCEDURE — 3331090002 HH PPS REVENUE DEBIT

## 2021-04-19 ENCOUNTER — HOME CARE VISIT (OUTPATIENT)
Dept: SCHEDULING | Facility: HOME HEALTH | Age: 81
End: 2021-04-19
Payer: MEDICARE

## 2021-04-19 PROCEDURE — 3331090001 HH PPS REVENUE CREDIT

## 2021-04-19 PROCEDURE — 3331090002 HH PPS REVENUE DEBIT

## 2021-04-19 PROCEDURE — G0152 HHCP-SERV OF OT,EA 15 MIN: HCPCS

## 2021-04-20 VITALS
TEMPERATURE: 98.6 F | RESPIRATION RATE: 17 BRPM | OXYGEN SATURATION: 99 % | DIASTOLIC BLOOD PRESSURE: 80 MMHG | SYSTOLIC BLOOD PRESSURE: 158 MMHG | HEART RATE: 64 BPM

## 2021-07-07 ENCOUNTER — HOSPITAL ENCOUNTER (OUTPATIENT)
Dept: LAB | Age: 81
Discharge: HOME | End: 2021-07-07
Payer: MEDICARE

## 2021-07-07 DIAGNOSIS — I51.7 LVH (LEFT VENTRICULAR HYPERTROPHY): ICD-10-CM

## 2021-07-07 PROCEDURE — 36415 COLL VENOUS BLD VENIPUNCTURE: CPT

## 2021-07-07 PROCEDURE — 86334 IMMUNOFIX E-PHORESIS SERUM: CPT

## 2021-07-07 PROCEDURE — 82784 ASSAY IGA/IGD/IGG/IGM EACH: CPT

## 2021-07-09 LAB
ALBUMIN SERPL ELPH-MCNC: 3.87 G/DL (ref 3.2–5.6)
ALBUMIN/GLOB SERPL: 1.2 {RATIO}
ALPHA1 GLOB SERPL ELPH-MCNC: 0.22 G/DL (ref 0.1–0.4)
ALPHA2 GLOB SERPL ELPH-MCNC: 1.01 G/DL (ref 0.4–1.2)
B-GLOBULIN SERPL QL ELPH: 0.91 G/DL (ref 0.6–1.3)
GAMMA GLOB MFR SERPL ELPH: 1.19 G/DL (ref 0.5–1.6)
IGA SERPL-MCNC: 162 MG/DL (ref 85–499)
IGG SERPL-MCNC: 1079 MG/DL (ref 610–1616)
IGM SERPL-MCNC: 71 MG/DL (ref 35–242)
M PROTEIN SERPL ELPH-MCNC: 0.43 G/DL
PROT PATTERN SERPL ELPH-IMP: ABNORMAL
PROT PATTERN SPEC IFE-IMP: ABNORMAL
PROT SERPL-MCNC: 7.2 G/DL (ref 6.3–8.2)

## 2021-09-20 ENCOUNTER — HOSPITAL ENCOUNTER (OUTPATIENT)
Dept: LAB | Age: 81
Discharge: HOME | End: 2021-09-20

## 2021-09-20 DIAGNOSIS — D47.2 MGUS (MONOCLONAL GAMMOPATHY OF UNKNOWN SIGNIFICANCE): ICD-10-CM

## 2021-09-20 DIAGNOSIS — E53.8 VITAMIN B12 DEFICIENCY: ICD-10-CM

## 2021-09-20 DIAGNOSIS — I10 ESSENTIAL (PRIMARY) HYPERTENSION: ICD-10-CM

## 2021-09-20 DIAGNOSIS — D50.8 OTHER IRON DEFICIENCY ANEMIA: ICD-10-CM

## 2021-09-20 DIAGNOSIS — E55.9 VITAMIN D DEFICIENCY: ICD-10-CM

## 2021-09-29 ENCOUNTER — HOSPITAL ENCOUNTER (OUTPATIENT)
Dept: GENERAL RADIOLOGY | Age: 81
Discharge: HOME | End: 2021-09-29
Attending: INTERNAL MEDICINE
Payer: MEDICARE

## 2021-09-29 ENCOUNTER — HOSPITAL ENCOUNTER (OUTPATIENT)
Dept: LAB | Age: 81
Discharge: HOME | End: 2021-09-29
Attending: INTERNAL MEDICINE
Payer: MEDICARE

## 2021-09-29 DIAGNOSIS — D50.8 OTHER IRON DEFICIENCY ANEMIA: ICD-10-CM

## 2021-09-29 DIAGNOSIS — E53.8 VITAMIN B12 DEFICIENCY: ICD-10-CM

## 2021-09-29 DIAGNOSIS — E55.9 VITAMIN D DEFICIENCY: ICD-10-CM

## 2021-09-29 DIAGNOSIS — D47.2 MGUS (MONOCLONAL GAMMOPATHY OF UNKNOWN SIGNIFICANCE): ICD-10-CM

## 2021-09-29 LAB
25(OH)D3 SERPL-MCNC: 15.2 NG/ML (ref 30–100)
ALBUMIN SERPL-MCNC: 3.8 G/DL (ref 3.2–4.6)
ALBUMIN/GLOB SERPL: 1 {RATIO} (ref 1.2–3.5)
ALP SERPL-CCNC: 104 U/L (ref 50–136)
ALT SERPL-CCNC: 12 U/L (ref 12–65)
ANION GAP SERPL CALC-SCNC: 8 MMOL/L (ref 7–16)
AST SERPL-CCNC: 20 U/L (ref 15–37)
BASOPHILS # BLD: 0.1 K/UL (ref 0–0.2)
BASOPHILS NFR BLD: 1 % (ref 0–2)
BILIRUB SERPL-MCNC: 0.4 MG/DL (ref 0.2–1.1)
BUN SERPL-MCNC: 29 MG/DL (ref 8–23)
CALCIUM SERPL-MCNC: 9.1 MG/DL (ref 8.3–10.4)
CHLORIDE SERPL-SCNC: 104 MMOL/L (ref 98–107)
CO2 SERPL-SCNC: 22 MMOL/L (ref 21–32)
CREAT SERPL-MCNC: 1.39 MG/DL (ref 0.6–1)
CRP SERPL HS-MCNC: 5.8 MG/L
DAT POLY-SP REAG RBC QL: NORMAL
DIFFERENTIAL METHOD BLD: ABNORMAL
EOSINOPHIL # BLD: 0.4 K/UL (ref 0–0.8)
EOSINOPHIL NFR BLD: 3 % (ref 0.5–7.8)
ERYTHROCYTE [DISTWIDTH] IN BLOOD BY AUTOMATED COUNT: 13.9 % (ref 11.9–14.6)
ERYTHROCYTE [SEDIMENTATION RATE] IN BLOOD: 25 MM/HR (ref 0–30)
FERRITIN SERPL-MCNC: 157 NG/ML (ref 8–388)
GLOBULIN SER CALC-MCNC: 3.7 G/DL (ref 2.3–3.5)
GLUCOSE SERPL-MCNC: 75 MG/DL (ref 65–100)
HCT VFR BLD AUTO: 38.3 % (ref 35.8–46.3)
HGB BLD-MCNC: 12.1 G/DL (ref 11.7–15.4)
HGB RETIC QN AUTO: 35 PG (ref 29–35)
IMM GRANULOCYTES # BLD AUTO: 0.1 K/UL (ref 0–0.5)
IMM GRANULOCYTES NFR BLD AUTO: 1 % (ref 0–5)
IMM RETICS NFR: 11.8 % (ref 3–15.9)
LDH SERPL L TO P-CCNC: 210 U/L (ref 110–210)
LYMPHOCYTES # BLD: 4.6 K/UL (ref 0.5–4.6)
LYMPHOCYTES NFR BLD: 36 % (ref 13–44)
MCH RBC QN AUTO: 28.9 PG (ref 26.1–32.9)
MCHC RBC AUTO-ENTMCNC: 31.6 G/DL (ref 31.4–35)
MCV RBC AUTO: 91.4 FL (ref 79.6–97.8)
MONOCYTES # BLD: 1 K/UL (ref 0.1–1.3)
MONOCYTES NFR BLD: 8 % (ref 4–12)
NEUTS SEG # BLD: 6.8 K/UL (ref 1.7–8.2)
NEUTS SEG NFR BLD: 53 % (ref 43–78)
NRBC # BLD: 0 K/UL (ref 0–0.2)
PLATELET # BLD AUTO: 203 K/UL (ref 150–450)
PMV BLD AUTO: 9.7 FL (ref 9.4–12.3)
POTASSIUM SERPL-SCNC: 4.5 MMOL/L (ref 3.5–5.1)
PROT SERPL-MCNC: 7.5 G/DL (ref 6.3–8.2)
RBC # BLD AUTO: 4.19 M/UL (ref 4.05–5.2)
RETICS # AUTO: 0.11 M/UL (ref 0.03–0.1)
RETICS/RBC NFR AUTO: 2.6 % (ref 0.3–2)
SODIUM SERPL-SCNC: 134 MMOL/L (ref 136–145)
VIT B12 SERPL-MCNC: >6000 PG/ML (ref 193–986)
WBC # BLD AUTO: 12.8 K/UL (ref 4.3–11.1)

## 2021-09-29 PROCEDURE — 83615 LACTATE (LD) (LDH) ENZYME: CPT

## 2021-09-29 PROCEDURE — 83883 ASSAY NEPHELOMETRY NOT SPEC: CPT

## 2021-09-29 PROCEDURE — 86880 COOMBS TEST DIRECT: CPT

## 2021-09-29 PROCEDURE — 85652 RBC SED RATE AUTOMATED: CPT

## 2021-09-29 PROCEDURE — 86334 IMMUNOFIX E-PHORESIS SERUM: CPT

## 2021-09-29 PROCEDURE — 86141 C-REACTIVE PROTEIN HS: CPT

## 2021-09-29 PROCEDURE — 36415 COLL VENOUS BLD VENIPUNCTURE: CPT

## 2021-09-29 PROCEDURE — 80053 COMPREHEN METABOLIC PANEL: CPT

## 2021-09-29 PROCEDURE — 82232 ASSAY OF BETA-2 PROTEIN: CPT

## 2021-09-29 PROCEDURE — 85046 RETICYTE/HGB CONCENTRATE: CPT

## 2021-09-29 PROCEDURE — 77075 RADEX OSSEOUS SURVEY COMPL: CPT

## 2021-09-29 PROCEDURE — 85025 COMPLETE CBC W/AUTO DIFF WBC: CPT

## 2021-09-29 PROCEDURE — 83010 ASSAY OF HAPTOGLOBIN QUANT: CPT

## 2021-09-29 PROCEDURE — 82607 VITAMIN B-12: CPT

## 2021-09-29 PROCEDURE — 82306 VITAMIN D 25 HYDROXY: CPT

## 2021-09-29 PROCEDURE — 82784 ASSAY IGA/IGD/IGG/IGM EACH: CPT

## 2021-09-29 PROCEDURE — 82728 ASSAY OF FERRITIN: CPT

## 2021-09-30 LAB
B2 MICROGLOB SERPL-MCNC: 7.6 MG/L (ref 0.8–2.34)
HAPTOGLOB SERPL-MCNC: 164 MG/DL (ref 30–200)
KAPPA LC FREE SER-MCNC: 61.31 MG/L (ref 3.3–19.4)
KAPPA LC FREE/LAMBDA FREE SER: 1.54 {RATIO} (ref 0.26–1.65)
LAMBDA LC FREE SERPL-MCNC: 39.83 MG/L (ref 5.71–26.3)

## 2021-10-01 LAB
ALBUMIN SERPL ELPH-MCNC: 4.02 G/DL (ref 3.2–5.6)
ALBUMIN/GLOB SERPL: 1.2 {RATIO}
ALPHA1 GLOB SERPL ELPH-MCNC: 0.26 G/DL (ref 0.1–0.4)
ALPHA2 GLOB SERPL ELPH-MCNC: 0.97 G/DL (ref 0.4–1.2)
B-GLOBULIN SERPL QL ELPH: 0.9 G/DL (ref 0.6–1.3)
GAMMA GLOB MFR SERPL ELPH: 1.24 G/DL (ref 0.5–1.6)
IGA SERPL-MCNC: 156 MG/DL (ref 85–499)
IGG SERPL-MCNC: 1062 MG/DL (ref 610–1616)
IGM SERPL-MCNC: 58 MG/DL (ref 35–242)
M PROTEIN SERPL ELPH-MCNC: 0.46 G/DL
PROT PATTERN SERPL ELPH-IMP: ABNORMAL
PROT PATTERN SPEC IFE-IMP: ABNORMAL
PROT SERPL-MCNC: 7.4 G/DL (ref 6.3–8.2)

## 2021-10-13 PROBLEM — D47.2 MGUS (MONOCLONAL GAMMOPATHY OF UNKNOWN SIGNIFICANCE): Status: ACTIVE | Noted: 2021-10-13

## 2022-03-06 ENCOUNTER — APPOINTMENT (OUTPATIENT)
Dept: GENERAL RADIOLOGY | Age: 82
DRG: 872 | End: 2022-03-06
Attending: EMERGENCY MEDICINE
Payer: MEDICARE

## 2022-03-06 ENCOUNTER — APPOINTMENT (OUTPATIENT)
Dept: CT IMAGING | Age: 82
DRG: 872 | End: 2022-03-06
Attending: EMERGENCY MEDICINE
Payer: MEDICARE

## 2022-03-06 ENCOUNTER — HOSPITAL ENCOUNTER (INPATIENT)
Age: 82
LOS: 8 days | Discharge: NURSING, REHAB OR LONG-TERM CARE | DRG: 872 | End: 2022-03-14
Attending: EMERGENCY MEDICINE | Admitting: INTERNAL MEDICINE
Payer: MEDICARE
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
## 2022-03-06 DIAGNOSIS — D72.829 LEUKOCYTOSIS, UNSPECIFIED TYPE: ICD-10-CM

## 2022-03-06 DIAGNOSIS — S09.90XA INJURY OF HEAD, INITIAL ENCOUNTER: ICD-10-CM

## 2022-03-06 DIAGNOSIS — S42.295A OTHER CLOSED NONDISPLACED FRACTURE OF PROXIMAL END OF LEFT HUMERUS, INITIAL ENCOUNTER: ICD-10-CM

## 2022-03-06 DIAGNOSIS — N30.00 ACUTE CYSTITIS WITHOUT HEMATURIA: ICD-10-CM

## 2022-03-06 PROBLEM — N39.0 UTI (URINARY TRACT INFECTION): Status: ACTIVE | Noted: 2022-03-06

## 2022-03-06 PROBLEM — S42.309A HUMERUS FRACTURE: Status: ACTIVE | Noted: 2022-03-06

## 2022-03-06 LAB
ABO + RH BLD: NORMAL
ALBUMIN SERPL-MCNC: 2.8 G/DL (ref 3.2–4.6)
ALBUMIN/GLOB SERPL: 0.7 {RATIO} (ref 1.2–3.5)
ALP SERPL-CCNC: 116 U/L (ref 50–136)
ALT SERPL-CCNC: 20 U/L (ref 12–65)
ANION GAP SERPL CALC-SCNC: 9 MMOL/L (ref 7–16)
APPEARANCE UR: ABNORMAL
AST SERPL-CCNC: 34 U/L (ref 15–37)
BACTERIA URNS QL MICRO: ABNORMAL /HPF
BASOPHILS # BLD: 0 K/UL (ref 0–0.2)
BASOPHILS NFR BLD: 0 % (ref 0–2)
BILIRUB SERPL-MCNC: 0.7 MG/DL (ref 0.2–1.1)
BILIRUB UR QL: NEGATIVE
BLOOD GROUP ANTIBODIES SERPL: NORMAL
BUN SERPL-MCNC: 32 MG/DL (ref 8–23)
CALCIUM SERPL-MCNC: 8.6 MG/DL (ref 8.3–10.4)
CHLORIDE SERPL-SCNC: 102 MMOL/L (ref 98–107)
CO2 SERPL-SCNC: 23 MMOL/L (ref 21–32)
COLOR UR: YELLOW
CREAT SERPL-MCNC: 1.5 MG/DL (ref 0.6–1)
DIFFERENTIAL METHOD BLD: ABNORMAL
EOSINOPHIL # BLD: 0 K/UL (ref 0–0.8)
EOSINOPHIL NFR BLD: 0 % (ref 0.5–7.8)
ERYTHROCYTE [DISTWIDTH] IN BLOOD BY AUTOMATED COUNT: 14.1 % (ref 11.9–14.6)
GLOBULIN SER CALC-MCNC: 4.3 G/DL (ref 2.3–3.5)
GLUCOSE SERPL-MCNC: 137 MG/DL (ref 65–100)
GLUCOSE UR STRIP.AUTO-MCNC: NEGATIVE MG/DL
HCT VFR BLD AUTO: 32.9 % (ref 35.8–46.3)
HGB BLD-MCNC: 10.6 G/DL (ref 11.7–15.4)
HGB UR QL STRIP: ABNORMAL
IMM GRANULOCYTES # BLD AUTO: 0.2 K/UL (ref 0–0.5)
IMM GRANULOCYTES NFR BLD AUTO: 1 % (ref 0–5)
INR PPP: 1
KETONES UR QL STRIP.AUTO: NEGATIVE MG/DL
LEUKOCYTE ESTERASE UR QL STRIP.AUTO: ABNORMAL
LYMPHOCYTES # BLD: 2.6 K/UL (ref 0.5–4.6)
LYMPHOCYTES NFR BLD: 14 % (ref 13–44)
MAGNESIUM SERPL-MCNC: 2.4 MG/DL (ref 1.8–2.4)
MCH RBC QN AUTO: 29.8 PG (ref 26.1–32.9)
MCHC RBC AUTO-ENTMCNC: 32.2 G/DL (ref 31.4–35)
MCV RBC AUTO: 92.4 FL (ref 79.6–97.8)
MONOCYTES # BLD: 2.6 K/UL (ref 0.1–1.3)
MONOCYTES NFR BLD: 14 % (ref 4–12)
NEUTS SEG # BLD: 13.7 K/UL (ref 1.7–8.2)
NEUTS SEG NFR BLD: 71 % (ref 43–78)
NITRITE UR QL STRIP.AUTO: POSITIVE
NRBC # BLD: 0 K/UL (ref 0–0.2)
OTHER OBSERVATIONS,UCOM: ABNORMAL
PH UR STRIP: 5.5 [PH] (ref 5–9)
PLATELET # BLD AUTO: 214 K/UL (ref 150–450)
PMV BLD AUTO: 10.4 FL (ref 9.4–12.3)
POTASSIUM SERPL-SCNC: 4.4 MMOL/L (ref 3.5–5.1)
PROT SERPL-MCNC: 7.1 G/DL (ref 6.3–8.2)
PROT UR STRIP-MCNC: 30 MG/DL
PROTHROMBIN TIME: 13.7 SEC (ref 12.6–14.5)
RBC # BLD AUTO: 3.56 M/UL (ref 4.05–5.2)
SODIUM SERPL-SCNC: 134 MMOL/L (ref 136–145)
SP GR UR REFRACTOMETRY: 1.01 (ref 1–1.02)
SPECIMEN EXP DATE BLD: NORMAL
UROBILINOGEN UR QL STRIP.AUTO: 0.2 EU/DL (ref 0.2–1)
WBC # BLD AUTO: 19.1 K/UL (ref 4.3–11.1)
WBC URNS QL MICRO: >100 /HPF

## 2022-03-06 PROCEDURE — 81001 URINALYSIS AUTO W/SCOPE: CPT

## 2022-03-06 PROCEDURE — 80053 COMPREHEN METABOLIC PANEL: CPT

## 2022-03-06 PROCEDURE — 65270000029 HC RM PRIVATE

## 2022-03-06 PROCEDURE — 73060 X-RAY EXAM OF HUMERUS: CPT

## 2022-03-06 PROCEDURE — 99285 EMERGENCY DEPT VISIT HI MDM: CPT

## 2022-03-06 PROCEDURE — 87088 URINE BACTERIA CULTURE: CPT

## 2022-03-06 PROCEDURE — 73030 X-RAY EXAM OF SHOULDER: CPT

## 2022-03-06 PROCEDURE — 86900 BLOOD TYPING SEROLOGIC ABO: CPT

## 2022-03-06 PROCEDURE — 70450 CT HEAD/BRAIN W/O DYE: CPT

## 2022-03-06 PROCEDURE — 72125 CT NECK SPINE W/O DYE: CPT

## 2022-03-06 PROCEDURE — 87086 URINE CULTURE/COLONY COUNT: CPT

## 2022-03-06 PROCEDURE — 85025 COMPLETE CBC W/AUTO DIFF WBC: CPT

## 2022-03-06 PROCEDURE — 93005 ELECTROCARDIOGRAM TRACING: CPT | Performed by: EMERGENCY MEDICINE

## 2022-03-06 PROCEDURE — 83735 ASSAY OF MAGNESIUM: CPT

## 2022-03-06 PROCEDURE — 74011250636 HC RX REV CODE- 250/636: Performed by: EMERGENCY MEDICINE

## 2022-03-06 PROCEDURE — 84145 PROCALCITONIN (PCT): CPT

## 2022-03-06 PROCEDURE — 87186 SC STD MICRODIL/AGAR DIL: CPT

## 2022-03-06 PROCEDURE — 85610 PROTHROMBIN TIME: CPT

## 2022-03-06 RX ADMIN — SODIUM CHLORIDE 1000 ML: 900 INJECTION, SOLUTION INTRAVENOUS at 23:22

## 2022-03-06 NOTE — ED TRIAGE NOTES
Patient presents via [facility] from home with complaints   Wednesday patient wasn't feeling well. Patient with fall Thursday. Patient with unknown LOC. Patient unable to describe why, how she fell or what she fell onto. Patient with asa denies other blood thinners. Family states that they brought her home she had near syncope. Since then she has been in her normal state of health. Patient with complaints of left arm.     Vs en route 61  HR 98% RA

## 2022-03-07 ENCOUNTER — APPOINTMENT (OUTPATIENT)
Dept: GENERAL RADIOLOGY | Age: 82
DRG: 872 | End: 2022-03-07
Attending: INTERNAL MEDICINE
Payer: MEDICARE

## 2022-03-07 PROBLEM — N18.9 ACUTE KIDNEY INJURY SUPERIMPOSED ON CHRONIC KIDNEY DISEASE (HCC): Status: ACTIVE | Noted: 2022-03-07

## 2022-03-07 PROBLEM — N17.9 ACUTE KIDNEY INJURY SUPERIMPOSED ON CHRONIC KIDNEY DISEASE (HCC): Status: ACTIVE | Noted: 2022-03-07

## 2022-03-07 PROBLEM — R62.7 ADULT FAILURE TO THRIVE: Status: ACTIVE | Noted: 2022-03-07

## 2022-03-07 LAB
BNP SERPL-MCNC: 3259 PG/ML
GLUCOSE BLD STRIP.AUTO-MCNC: 111 MG/DL (ref 65–100)
GLUCOSE BLD STRIP.AUTO-MCNC: 114 MG/DL (ref 65–100)
GLUCOSE BLD STRIP.AUTO-MCNC: 120 MG/DL (ref 65–100)
GLUCOSE BLD STRIP.AUTO-MCNC: 129 MG/DL (ref 65–100)
LACTATE SERPL-SCNC: 1.5 MMOL/L (ref 0.4–2)
PROCALCITONIN SERPL-MCNC: 0.55 NG/ML (ref 0–0.49)
SERVICE CMNT-IMP: ABNORMAL

## 2022-03-07 PROCEDURE — 97530 THERAPEUTIC ACTIVITIES: CPT

## 2022-03-07 PROCEDURE — 65270000029 HC RM PRIVATE

## 2022-03-07 PROCEDURE — 74011250637 HC RX REV CODE- 250/637: Performed by: INTERNAL MEDICINE

## 2022-03-07 PROCEDURE — 36415 COLL VENOUS BLD VENIPUNCTURE: CPT

## 2022-03-07 PROCEDURE — 71045 X-RAY EXAM CHEST 1 VIEW: CPT

## 2022-03-07 PROCEDURE — 76937 US GUIDE VASCULAR ACCESS: CPT

## 2022-03-07 PROCEDURE — 74011000250 HC RX REV CODE- 250: Performed by: INTERNAL MEDICINE

## 2022-03-07 PROCEDURE — 83605 ASSAY OF LACTIC ACID: CPT

## 2022-03-07 PROCEDURE — 83880 ASSAY OF NATRIURETIC PEPTIDE: CPT

## 2022-03-07 PROCEDURE — 86580 TB INTRADERMAL TEST: CPT | Performed by: INTERNAL MEDICINE

## 2022-03-07 PROCEDURE — 97165 OT EVAL LOW COMPLEX 30 MIN: CPT

## 2022-03-07 PROCEDURE — 97161 PT EVAL LOW COMPLEX 20 MIN: CPT

## 2022-03-07 PROCEDURE — 99222 1ST HOSP IP/OBS MODERATE 55: CPT | Performed by: PHYSICIAN ASSISTANT

## 2022-03-07 PROCEDURE — 97535 SELF CARE MNGMENT TRAINING: CPT

## 2022-03-07 PROCEDURE — 97112 NEUROMUSCULAR REEDUCATION: CPT

## 2022-03-07 PROCEDURE — 82962 GLUCOSE BLOOD TEST: CPT

## 2022-03-07 PROCEDURE — 74011250637 HC RX REV CODE- 250/637: Performed by: HOSPITALIST

## 2022-03-07 PROCEDURE — 74011250636 HC RX REV CODE- 250/636: Performed by: INTERNAL MEDICINE

## 2022-03-07 PROCEDURE — 87040 BLOOD CULTURE FOR BACTERIA: CPT

## 2022-03-07 RX ORDER — DORZOLAMIDE HYDROCHLORIDE AND TIMOLOL MALEATE 20; 5 MG/ML; MG/ML
1 SOLUTION/ DROPS OPHTHALMIC 2 TIMES DAILY
Status: DISCONTINUED | OUTPATIENT
Start: 2022-03-07 | End: 2022-03-14 | Stop reason: HOSPADM

## 2022-03-07 RX ORDER — SODIUM CHLORIDE 9 MG/ML
100 INJECTION, SOLUTION INTRAVENOUS CONTINUOUS
Status: DISCONTINUED | OUTPATIENT
Start: 2022-03-07 | End: 2022-03-09

## 2022-03-07 RX ORDER — LEVOTHYROXINE SODIUM 50 UG/1
50 TABLET ORAL
Status: DISCONTINUED | OUTPATIENT
Start: 2022-03-07 | End: 2022-03-14 | Stop reason: HOSPADM

## 2022-03-07 RX ORDER — ROSUVASTATIN CALCIUM 20 MG/1
20 TABLET, COATED ORAL
Status: DISCONTINUED | OUTPATIENT
Start: 2022-03-07 | End: 2022-03-14 | Stop reason: HOSPADM

## 2022-03-07 RX ORDER — ONDANSETRON 4 MG/1
4 TABLET, ORALLY DISINTEGRATING ORAL
Status: DISCONTINUED | OUTPATIENT
Start: 2022-03-07 | End: 2022-03-14 | Stop reason: HOSPADM

## 2022-03-07 RX ORDER — LATANOPROST 50 UG/ML
1 SOLUTION/ DROPS OPHTHALMIC
Status: DISCONTINUED | OUTPATIENT
Start: 2022-03-07 | End: 2022-03-14 | Stop reason: HOSPADM

## 2022-03-07 RX ORDER — CETIRIZINE HCL 10 MG
10 TABLET ORAL
Status: DISCONTINUED | OUTPATIENT
Start: 2022-03-07 | End: 2022-03-14 | Stop reason: HOSPADM

## 2022-03-07 RX ORDER — POLYETHYLENE GLYCOL 3350 17 G/17G
17 POWDER, FOR SOLUTION ORAL DAILY PRN
Status: DISCONTINUED | OUTPATIENT
Start: 2022-03-07 | End: 2022-03-14 | Stop reason: HOSPADM

## 2022-03-07 RX ORDER — HYDROCODONE BITARTRATE AND ACETAMINOPHEN 7.5; 325 MG/1; MG/1
1 TABLET ORAL
Status: DISCONTINUED | OUTPATIENT
Start: 2022-03-07 | End: 2022-03-14 | Stop reason: HOSPADM

## 2022-03-07 RX ORDER — ACETAMINOPHEN 650 MG/1
650 SUPPOSITORY RECTAL
Status: DISCONTINUED | OUTPATIENT
Start: 2022-03-07 | End: 2022-03-14 | Stop reason: HOSPADM

## 2022-03-07 RX ORDER — ENOXAPARIN SODIUM 100 MG/ML
30 INJECTION SUBCUTANEOUS DAILY
Status: DISCONTINUED | OUTPATIENT
Start: 2022-03-07 | End: 2022-03-08

## 2022-03-07 RX ORDER — SODIUM CHLORIDE 0.9 % (FLUSH) 0.9 %
5-40 SYRINGE (ML) INJECTION AS NEEDED
Status: DISCONTINUED | OUTPATIENT
Start: 2022-03-07 | End: 2022-03-14 | Stop reason: HOSPADM

## 2022-03-07 RX ORDER — METOPROLOL SUCCINATE 100 MG/1
100 TABLET, EXTENDED RELEASE ORAL DAILY
Status: DISCONTINUED | OUTPATIENT
Start: 2022-03-07 | End: 2022-03-14 | Stop reason: HOSPADM

## 2022-03-07 RX ORDER — INSULIN LISPRO 100 [IU]/ML
INJECTION, SOLUTION INTRAVENOUS; SUBCUTANEOUS
Status: DISCONTINUED | OUTPATIENT
Start: 2022-03-07 | End: 2022-03-14 | Stop reason: HOSPADM

## 2022-03-07 RX ORDER — SODIUM CHLORIDE 0.9 % (FLUSH) 0.9 %
5-40 SYRINGE (ML) INJECTION EVERY 8 HOURS
Status: DISCONTINUED | OUTPATIENT
Start: 2022-03-07 | End: 2022-03-14 | Stop reason: HOSPADM

## 2022-03-07 RX ORDER — ASPIRIN 81 MG/1
81 TABLET ORAL DAILY
Status: DISCONTINUED | OUTPATIENT
Start: 2022-03-07 | End: 2022-03-14 | Stop reason: HOSPADM

## 2022-03-07 RX ORDER — PREGABALIN 50 MG/1
50 CAPSULE ORAL 2 TIMES DAILY
Status: DISCONTINUED | OUTPATIENT
Start: 2022-03-07 | End: 2022-03-14 | Stop reason: HOSPADM

## 2022-03-07 RX ORDER — ACETAMINOPHEN 325 MG/1
650 TABLET ORAL
Status: DISCONTINUED | OUTPATIENT
Start: 2022-03-07 | End: 2022-03-14 | Stop reason: HOSPADM

## 2022-03-07 RX ORDER — ACETAMINOPHEN 500 MG
500 TABLET ORAL
Status: DISCONTINUED | OUTPATIENT
Start: 2022-03-07 | End: 2022-03-07 | Stop reason: SDUPTHER

## 2022-03-07 RX ORDER — FUROSEMIDE 40 MG/1
20 TABLET ORAL DAILY
Status: DISCONTINUED | OUTPATIENT
Start: 2022-03-07 | End: 2022-03-14 | Stop reason: HOSPADM

## 2022-03-07 RX ORDER — ONDANSETRON 2 MG/ML
4 INJECTION INTRAMUSCULAR; INTRAVENOUS
Status: DISCONTINUED | OUTPATIENT
Start: 2022-03-07 | End: 2022-03-14 | Stop reason: HOSPADM

## 2022-03-07 RX ORDER — PANTOPRAZOLE SODIUM 40 MG/1
40 TABLET, DELAYED RELEASE ORAL
Status: DISCONTINUED | OUTPATIENT
Start: 2022-03-07 | End: 2022-03-14 | Stop reason: HOSPADM

## 2022-03-07 RX ORDER — LOSARTAN POTASSIUM 50 MG/1
100 TABLET ORAL DAILY
Status: DISCONTINUED | OUTPATIENT
Start: 2022-03-07 | End: 2022-03-14 | Stop reason: HOSPADM

## 2022-03-07 RX ORDER — AMLODIPINE BESYLATE 5 MG/1
5 TABLET ORAL DAILY
Status: DISCONTINUED | OUTPATIENT
Start: 2022-03-07 | End: 2022-03-14 | Stop reason: HOSPADM

## 2022-03-07 RX ADMIN — METOPROLOL SUCCINATE 100 MG: 25 TABLET, EXTENDED RELEASE ORAL at 08:53

## 2022-03-07 RX ADMIN — LEVOTHYROXINE SODIUM 50 MCG: 0.05 TABLET ORAL at 08:53

## 2022-03-07 RX ADMIN — DORZOLAMIDE HYDROCHLORIDE AND TIMOLOL MALEATE 1 DROP: 20; 5 SOLUTION/ DROPS OPHTHALMIC at 17:16

## 2022-03-07 RX ADMIN — HYDROCODONE BITARTRATE AND ACETAMINOPHEN 1 TABLET: 7.5; 325 TABLET ORAL at 21:37

## 2022-03-07 RX ADMIN — SODIUM CHLORIDE, PRESERVATIVE FREE 10 ML: 5 INJECTION INTRAVENOUS at 17:16

## 2022-03-07 RX ADMIN — PANTOPRAZOLE SODIUM 40 MG: 40 TABLET, DELAYED RELEASE ORAL at 08:53

## 2022-03-07 RX ADMIN — LATANOPROST 1 DROP: 50 SOLUTION/ DROPS OPHTHALMIC at 21:37

## 2022-03-07 RX ADMIN — AMLODIPINE BESYLATE 5 MG: 5 TABLET ORAL at 08:53

## 2022-03-07 RX ADMIN — DORZOLAMIDE HYDROCHLORIDE AND TIMOLOL MALEATE 1 DROP: 20; 5 SOLUTION/ DROPS OPHTHALMIC at 11:47

## 2022-03-07 RX ADMIN — ASPIRIN 81 MG: 81 TABLET ORAL at 08:53

## 2022-03-07 RX ADMIN — ROSUVASTATIN CALCIUM 20 MG: 20 TABLET, FILM COATED ORAL at 21:37

## 2022-03-07 RX ADMIN — ENOXAPARIN SODIUM 30 MG: 100 INJECTION SUBCUTANEOUS at 08:54

## 2022-03-07 RX ADMIN — SODIUM CHLORIDE 100 ML/HR: 900 INJECTION, SOLUTION INTRAVENOUS at 17:16

## 2022-03-07 RX ADMIN — Medication 5 UNITS: at 05:25

## 2022-03-07 RX ADMIN — PREGABALIN 50 MG: 50 CAPSULE ORAL at 20:39

## 2022-03-07 RX ADMIN — SODIUM CHLORIDE, PRESERVATIVE FREE 10 ML: 5 INJECTION INTRAVENOUS at 03:00

## 2022-03-07 RX ADMIN — PREGABALIN 50 MG: 50 CAPSULE ORAL at 08:53

## 2022-03-07 RX ADMIN — SODIUM CHLORIDE, PRESERVATIVE FREE 10 ML: 5 INJECTION INTRAVENOUS at 21:38

## 2022-03-07 NOTE — PROGRESS NOTES
Pt admitted to Med Surg Overflow unit, skin check performed upon arrival to unit with this RN and Toby Blackburn RN. Skin in between skin folds is reddened, including sacrum and between gluteal folds. In the center of the gluteal fold there is a nickel sized open ulcer with dark base. Wound consult ordered. Mepilex applied.

## 2022-03-07 NOTE — PROGRESS NOTES
ACUTE OT GOALS:  (Developed with and agreed upon by patient and/or caregiver.)  1. Patient will complete lower body bathing and dressing with MIN A and adaptive equipment as needed. 2.Patient will complete upper body bathing and dressing with MIN A and adaptive equipment as needed. 3. Patient will complete toileting with MIN A.   4. Patient will tolerate 25 minutes of OT treatment with 1-2 rest breaks to increase activity tolerance for ADLs. 5. Patient will complete functional transfers with SBA and adaptive equipment as needed. 6. Patient will complete functional activity with SUPERVISION and adaptive equipment as needed. Timeframe: 7 visits       OCCUPATIONAL THERAPY ASSESSMENT: Initial Assessment and Daily Note OT Treatment Day # 1    Rosanne Alcala is a 78 y.o. female   PRIMARY DIAGNOSIS: Humerus fracture  Fall [W19. XXXA]  Humerus fracture [S42.309A]  UTI (urinary tract infection) [N39.0]       Reason for Referral:    ICD-10: Treatment Diagnosis: Generalized Muscle Weakness (M62.81)  Other lack of cordination (R27.8)  Difficulty in walking, Not elsewhere classified (R26.2)  [place] / [place] / Product Type: Medicare /   ASSESSMENT:     REHAB RECOMMENDATIONS:   Recommendation to date pending progress:  Setting:   Inpatient Rehab Facility  Equipment:    To Be Determined     PRIOR LEVEL OF FUNCTION:  (Prior to Hospitalization)  INITIAL/CURRENT LEVEL OF FUNCTION:  (Based on today's evaluation)   Bathing:   Independent  Dressing:   Independent  Feeding/Grooming:   Independent  Toileting:   Independent  Functional Mobility:   Independent Bathing:   Maximal Assistance  Dressing:   Maximal Assistance  Feeding/Grooming:   Minimal Assistance  Toileting:   Moderate Assistance  Functional Mobility:   Minimal Assistance x 2     ASSESSMENT:  Ms. Anastasia Alcala presented to the hospital following fall and was found to have L humerus fx.  Per ortho, pt is non-op with conservative management. NWB LUE with LUE in sling. At baseline, pt is independent for her ADLs and IADLs (besides community tasks). Today, pt was received supine in bed. Completed bed mobility with Delia x2. MaxA for LB dressing. Sit>stand and ambulation to sink Delia x2. Multiple LOB with modA to correct. Completed grooming tasks standing at the sink with overall Delia. Pt and family concerned about her mobility due to not being able to ambulate with her RW as she typically does. Pt very motivated, kind, and willing to participate today. Evin Sales currently demonstrates overall deficits in strength, balance, activity tolerance, and ADL performance. Patient would benefit from skilled OT services at this time in order to address functional deficits and OT goals stated above. SUBJECTIVE:   Ms. ROONEY [employer] states, \"How am I going to do this one handed? \"    SOCIAL HISTORY/LIVING ENVIRONMENT: lives alone in a one level home, 2 steps to enter, uses  in home and [facility] outside home, tub shower with transfer bench, independent for ADLs and household IADLs, assist from community-level IADLs  Home Environment: Private residence  One/Two Story Residence: One story  Living Alone: Yes  Support Systems: Other Family Member(s)    OBJECTIVE:     PAIN: VITAL SIGNS: LINES/DRAINS:   Pre Treatment: Pain Screen  Pain Scale 1: Numeric (0 - 10)  Pain Intensity 1: 0  Post Treatment: no change  Vital Signs  O2 Device: None (Room air) Purewick  O2 Device: None (Room air)     GROSS EVALUATION:  BUEs Within Functional Limits Abnormal/ Functional Abnormal/ Non-Functional (see comments) Not Tested Comments:   AROM [x] [] [x] [] RUE WFL, LUE non-functional due to humerus fx   PROM [] [] [] [x]    Strength [] [x] [x] [] RUE functional for bADLs, LUE non-functional at this time   Balance [] [x] [] [] Fair+ sitting, fair standing    Posture [x] [] [] []    Sensation [x] [] [] []    Coordination [] [x] [] []    Tone [] [] [] [x] Edema [] [] [] [x]    Activity Tolerance [] [x] [] [] seated rest breaks required     [] [] [] []      COGNITION/  PERCEPTION: Intact Impaired   (see comments) Comments:   Orientation [x] []    Vision [x] []    Hearing [x] []    Judgment/ Insight [] [x] Decreased safety awareness    Attention [x] []    Memory [x] []    Command Following [x] []    Emotional Regulation [x] []     [] []      ACTIVITIES OF DAILY LIVING: I Mod I S SBA CGA Min Mod Max Total NT Comments   BASIC ADLs:              Bathing/ Showering [] [] [] [] [] [] [] [] [] [x]    Toileting [] [] [] [] [] [] [] [] [] [x]    Dressing [] [] [] [] [] [] [] [x] [] [] socks   Feeding [] [] [] [] [] [] [] [] [] [x]    Grooming [] [] [] [] [] [x] [] [] [] [] Hallie for bimanual grooming tasks  brushed teeth, washed face, and combed hair standing at the sink   Personal Device Care [] [] [] [] [] [] [] [] [] [x]    Functional Mobility [] [] [] [] [] [x] [] [] [] [] x2   I=Independent, Mod I=Modified Independent, S=Supervision, SBA=Standby Assistance, CGA=Contact Guard Assistance,   Min=Minimal Assistance, Mod=Moderate Assistance, Max=Maximal Assistance, Total=Total Assistance, NT=Not Tested    MOBILITY: I Mod I S SBA CGA Min Mod Max Total  NT x2 Comments:   Supine to sit [] [] [] [] [] [x] [] [] [] [] [x]    Sit to supine [] [] [] [] [] [] [] [] [] [x] [] Left sitting in the chair    Sit to stand [] [] [] [] [] [] [x] [] [] [] [x]    Bed to chair [] [] [] [] [] [x] [] [] [] [] [x]    I=Independent, Mod I=Modified Independent, S=Supervision, SBA=Standby Assistance, CGA=Contact Guard Assistance,   Min=Minimal Assistance, Mod=Moderate Assistance, Max=Maximal Assistance, Total=Total Assistance, NT=Not Tested    [employer] [facility] 6 Clicks   Daily Activity Inpatient Short Form        How much help from another person does the patient currently need. .. Total A Lot A Little None   1. Putting on and taking off regular lower body clothing?    [] 1   [x] 2   [] 3 [] 4   2. Bathing (including washing, rinsing, drying)? [] 1   [x] 2   [] 3   [] 4   3. Toileting, which includes using toilet, bedpan or urinal?   [] 1   [x] 2   [] 3   [] 4   4. Putting on and taking off regular upper body clothing? [] 1   [x] 2   [] 3   [] 4   5. Taking care of personal grooming such as brushing teeth? [] 1   [] 2   [x] 3   [] 4   6. Eating meals? [] 1   [] 2   [x] 3   [] 4   © 2007, Trustees of [facility] [employer], under license to [employer]. All rights reserved     Score:  Initial: 14 completed on 3/7/22 Most Recent: X (Date: -- )   Interpretation of Tool:  Represents activities that are increasingly more difficult (i.e. Bed mobility, Transfers, Gait). PLAN:   FREQUENCY/DURATION: OT Plan of Care: 3 times/week for duration of hospital stay or until stated goals are met, whichever comes first.    PROBLEM LIST:   (Skilled intervention is medically necessary to address:)  1. Decreased ADL/Functional Activities  2. Decreased Activity Tolerance  3. Decreased Balance  4. Decreased Cognition  5. Decreased Coordination  6. Decreased Strength  7. Decreased Transfer Abilities   INTERVENTIONS PLANNED:   (Benefits and precautions of occupational therapy have been discussed with the patient.)  1. Self Care Training  2. Therapeutic Activity  3. Therapeutic Exercise/HEP  4. Neuromuscular Re-education  5. Education     TREATMENT:     EVALUATION: Low complexity (Untimed Charge)    TREATMENT:   ($$ Self Care/Home Management: 8-22 mins$$ Neuromuscular Re-Education: 8-22 mins   )  Co-Treatment PT/OT necessary due to patient's decreased overall endurance/tolerance levels, as well as need for high level skilled assistance to complete functional transfers/mobility and functional tasks  Self Care (10 Minutes): Self care including Lower Body Dressing and Grooming to increase independence and decrease level of assistance required.   Neuromuscular Re-education (10 Minutes): Neuromuscular Re-education included Balance Training, Coordination training, Postural training, Sitting balance training and Standing balance training to improve Balance, Coordination, Functional Mobility and Postural Control.     TREATMENT GRID:  N/A    AFTER TREATMENT POSITION/PRECAUTIONS:  Chair, Needs within reach and RN notified    INTERDISCIPLINARY COLLABORATION:  RN/PCT, PT/PTA and OT/ZAIDI    TOTAL TREATMENT DURATION:  OT Patient Time In/Time Out  Time In: 1023  Time Out: [address], OT

## 2022-03-07 NOTE — PROGRESS NOTES
ACUTE PHYSICAL THERAPY GOALS:  (Developed with and agreed upon by patient and/or caregiver. )  LTG:  (1.)Ms. Mendel Pintos will move from supine to sit and sit to supine , scoot up and down and roll side to side in bed with STAND BY ASSIST within 7 treatment day(s). (2.)Ms. Mendel Pintos will transfer from bed to chair and chair to bed with CONTACT GUARD ASSIST using the least restrictive device within 7 treatment day(s). (3.)Ms. Mendel Pintos will ambulate with CONTACT GUARD ASSIST for 150 feet with the least restrictive device within 7 treatment day(s). (4.)Ms. Mendel Pintos will perform exercises per HEP for 15+ minutes to improve strength and mobility within 7 days. ________________________________________________________________________________________________      PHYSICAL THERAPY ASSESSMENT: Initial Assessment and AM PT Treatment Day # 1      Eunice Fenton Mendel Pintos is a 78 y.o. female   PRIMARY DIAGNOSIS: Humerus fracture  Fall [W19. XXXA]  Humerus fracture [S42.309A]  UTI (urinary tract infection) [N39.0]       Reason for Referral:  Left humerus fx, UTI  ICD-10: Treatment Diagnosis: Generalized Muscle Weakness (M62.81)  Difficulty in walking, Not elsewhere classified (R26.2)  Other abnormalities of gait and mobility (R26.89)  History of falling (Z91.81)  INPAT[place]: [place] / [place] PART [place] B / Product Type: Medicare /     ASSESSMENT:     REHAB RECOMMENDATIONS:   Recommendation to date pending progress:  [address] [place] [address]  Equipment:    To Be Determined     PRIOR LEVEL OF FUNCTION:  (Prior to Hospitalization) INITIAL/CURRENT LEVEL OF FUNCTION:  (Most Recently Demonstrated)   Bed Mobility:   Not tested  Sit to Stand:   Modified Independent  Transfers:   Modified Independent  Gait/Mobility:   Modified Independent Bed Mobility:   Moderate Assistance x 1-2  Sit to Stand:   Minimal Assistance - moderate assistance x 1-2  Transfers:   Minimal Assistance  - moderate assistance x 1-2  Gait/Mobility:   Minimal Assistance  - moderate assistance x 1-2     ASSESSMENT:  Ms. Sienna Hinds is admitted from home after fall, found with left proximal humerus fracture. Treated non operatively per ortho with left UE in sling, NWB. Pt lives alone and is typically ambulatory with walker which she will be unable to use currently with left UE injury/NWB. She presents without specific complaints, agreeable to therapy. Needs min-mod assist of 1-2 for bed mobility, transfers, and ambulation in room with handheld assist (could try cane in upcoming sessions). Practiced standing balance, ambulation, and standing static/dynamic functional activities at sink for extended time with one seated rest break due to back/LE pain (chronic). Pt does need assistance due to weakness, impaired balance, and inability to use her normal assistive device. Transfer to chair with cues for transfer technique and safety. Positioned comfortably in chair with legs elevated, needs in reach. Ms. Sienna Hinds will benefit from continued therapy during hospital stay to address deficits/maximize safety and independence with mobility. Will need additional rehab upon discharge from hospital.      SUBJECTIVE:   Ms. Sienna Hinds states, Stephen Hu you for letting me do this\"    SOCIAL HISTORY/LIVING ENVIRONMENT: Lives alone. Mod I with rolling walker at baseline. Niece/other family members come to assist frequently.    Home Environment: Private residence  One/Two Story Residence: One story  Living Alone: Yes  Support Systems: Other Family Member(s)  OBJECTIVE:     PAIN: VITAL SIGNS: LINES/DRAINS:   Pre Treatment: Pain Screen  Pain Scale 1: Numeric (0 - 10)  Pain Intensity 1: 0  Post Treatment: 0/10 Vital Signs  O2 Device: None (Room air) Purewick  O2 Device: None (Room air)     GROSS EVALUATION:   Within Functional Limits Abnormal/ Functional Abnormal/ Non-Functional (see comments) Not Tested Comments:   AROM [x] [] [] []    PROM [] [] [] []    Strength [] [x] [] []    Balance [] [x] [] []    Posture [] [x] [] []    Sensation [] [] [] []    Coordination [] [] [] []    Tone [] [] [] []    Edema [] [] [] []    Activity Tolerance [] [x] [] []     [] [] [] []      COGNITION/  PERCEPTION: Intact Impaired   (see comments) Comments:   Orientation [x] []    Vision [x] []    Hearing [x] []    Command Following [x] []    Safety Awareness [x] []     [] []      MOBILITY: I Mod I S SBA CGA Min Mod Max Total  NT x2 Comments:   Bed Mobility    Rolling [] [] [] [] [] [] [] [] [] [] []    Supine to Sit [] [] [] [] [] [x] [x] [] [] [] [x]    Scooting [] [] [] [] [] [x] [x] [] [] [] [x]    Sit to Supine [] [] [] [] [] [] [] [] [] [] []    Transfers    Sit to Stand [] [] [] [] [] [x] [x] [] [] [] [x]    Bed to Chair [] [] [] [] [] [x] [x] [] [] [] [x]    Stand to Sit [] [] [] [] [] [x] [x] [] [] [] [x]    I=Independent, Mod I=Modified Independent, S=Supervision, SBA=Standby Assistance, CGA=Contact Guard Assistance,   Min=Minimal Assistance, Mod=Moderate Assistance, Max=Maximal Assistance, Total=Total Assistance, NT=Not Tested  GAIT: I Mod I S SBA CGA Min Mod Max Total  NT x2 Comments:   Level of Assistance [] [] [] [] [] [x] [x] [] [] [] [x]    Distance 12 ft x 2     DME handheld assist R UE    Gait Quality A little unsteady    Weightbearing Status N/A     I=Independent, Mod I=Modified Independent, S=Supervision, SBA=Standby Assistance, CGA=Contact Guard Assistance,   Min=Minimal Assistance, Mod=Moderate Assistance, Max=Maximal Assistance, Total=Total Assistance, NT=Not Tested    [facility] AM-PAC 6 Clicks   Basic Mobility Inpatient Short Form       How much difficulty does the patient currently have. .. Unable A Lot A Little None   1. Turning over in bed (including adjusting bedclothes, sheets and blankets)? [] 1   [] 2   [x] 3   [] 4   2. Sitting down on and standing up from a chair with arms ( e.g., wheelchair, bedside commode, etc.)   [] 1   [x] 2   [] 3   [] 4   3.   Moving from lying on back to sitting on the side of the bed? [] 1   [x] 2   [] 3   [] 4   How much help from another person does the patient currently need. .. Total A Lot A Little None   4. Moving to and from a bed to a chair (including a wheelchair)? [] 1   [x] 2   [] 3   [] 4   5. Need to walk in hospital room? [] 1   [x] 2   [] 3   [] 4   6. Climbing 3-5 steps with a railing? [x] 1   [] 2   [] 3   [] 4   © 2007, Trustees of [facility] [employer], under license to [employer]. All rights reserved     Score:  Initial: 12 Most Recent: X (Date: -- )    Interpretation of Tool:  Represents activities that are increasingly more difficult (i.e. Bed mobility, Transfers, Gait). PLAN:   FREQUENCY/DURATION: PT Plan of Care: 3 times/week for duration of hospital stay or until stated goals are met, whichever comes first.    PROBLEM LIST:   (Skilled intervention is medically necessary to address:)  1. Decreased ADL/Functional Activities  2. Decreased Activity Tolerance  3. Decreased Balance  4. Decreased Gait Ability  5. Decreased Strength  6. Decreased Transfer Abilities  7. Increased Pain    INTERVENTIONS PLANNED:   (Benefits and precautions of physical therapy have been discussed with the patient.)  1. Therapeutic Activity  2. Therapeutic Exercise/HEP  3. Neuromuscular Re-education  4. Gait Training  5. Manual Therapy  6. Education     TREATMENT:     EVALUATION: Low Complexity : (Untimed Charge)    TREATMENT:   ($$ Therapeutic Activity: 23-37 mins    )  Co-Treatment PT/OT necessary due to patient's decreased overall endurance/tolerance levels, as well as need for high level skilled assistance to complete functional transfers/mobility and functional tasks  Therapeutic Activity (23 Minutes):  Therapeutic activity included Supine to Sit, Scooting, Transfer Training, Ambulation on level ground, Sitting balance , Standing balance and standing static and dynamic functional activities to improve functional Mobility, Strength, Activity tolerance and balance, transfer and bed mobility technique/safety.     TREATMENT GRID:  N/A    AFTER TREATMENT POSITION/PRECAUTIONS:  Chair, Needs within reach and RN notified    INTERDISCIPLINARY COLLABORATION:  RN/PCT, PT/PTA and OT/ZAIDI    TOTAL TREATMENT DURATION:  PT Patient Time In/Time Out  Time In: 1023  Time Out: [facility] L, T

## 2022-03-07 NOTE — CONSULTS
Aydee Gutierrez MD  Medical Director  [address] [place] [address], [address] [facility]  Tel: [phone]       Physical Medicine & Rehabilitation Consult    Subjective:     Date of Consultation:  March 7, 2022  Referring Provider: Ana Johnson is a 78 y.o. female who is being evaluated at the request of the Hospitalist service for consideration for inpatient rehabilitation following a non op humerus fx. HPI;  Enrico Avilez a 78 y. o. female with a past medical history significant for hypertension, hypothyroidism and diabetes.  The patient lives at home alone but has family members checking in on her. Musa Bernard walks with a walker she had a fall on Thursday.  She denies feeling lightheaded dizzy or having any chest pain she states she has shortness of breath at baseline but it was not particularly worse at that time.     She stayed on the ground for about 2-1/2 to 3 hours until family member found her because she injured her left arm and was not able to transfer 78 alert bracelet properly. [place] family member called the medic alert people and they came and helped the patient from the ground to chair. Heather Portillo did not think that her arm was broken as she had the use of her hand and fingers. Linus Rinaldi over the last several days she is not being able to walk with her walker and therefore family members were not able to care for her.  She was brought to the hospital today with them requesting that she be placed for rehab.     In the ER she was found to have a left proximal humeral fracture, and leukocytosis and a UTI. \"      Principal Problem:    Humerus fracture (3/6/2022)    Active Problems:    Chronic kidney disease (2/18/2016)      Overview: Last Assessment & Plan: This is likely from diabetic nephropathy and long-standing diabetes       mellitus type 2      Continue current care and give IV fluids overnight. Repeat BMP in the morning. Diabetic polyneuropathy ([facility] [address] (2/1/2017)      Overview: Last Assessment & Plan:       The patient's clinical history and presentation is more consistent with a       diabetic neuropathy that is new onset in this patient with long-standing       diabetes mellitus type 2 and diabetic nephropathy. I will start her on Lyrica therapy starting today. Physical therapy to evaluate for mobility on this drug. We will continue to follow closely. No clinical signs or symptoms of gout or cellulitis. Type 2 diabetes mellitus ([facility] [address] (4/5/2017)      Overview: Last Assessment & Plan:       Check hemoglobin A1c. SSI with FSBG checks qAC and qhs. Adjust insulin as needed based on FSBG results. Diabetic diet and education as needed. Hypoactive thyroid (4/5/2017)      Overview: Last Assessment & Plan:       Continue home thyroid replacement meds. Check TSH in the AM and consider med adjustments based on results.       Class 2 severe obesity due to excess calories with serious comorbidity in adult [facility]) (4/5/2017)      Essential hypertension (5/4/2017)      Coronary artery disease involving native coronary artery of native heart without angina pectoris (5/31/2017)      UTI (urinary tract infection) (3/6/2022)      Fall (3/6/2022)      Adult failure to thrive (3/7/2022)      Acute kidney injury superimposed on chronic kidney disease ([facility] [address] (3/7/2022)      Past Medical History:   Diagnosis Date    Chronic anemia     Chronic anemia     Chronic back pain     DM (diabetes mellitus) ([facility] [address]     GERD (gastroesophageal reflux disease)     Gout     Hyperlipidemia     Hypothyroidism     Hypothyroidism     Obesity       Past Surgical History:   Procedure Laterality Date    HX HEART CATHETERIZATION        Family History   Problem Relation Age of Onset    Heart Disease Mother     Heart Disease Father     Heart Disease Brother       Social History     Tobacco Use    Smoking status: Never Smoker    Smokeless tobacco: Never Used   Substance Use Topics    Alcohol use: Not on file     Prior to Admission medications    Medication Sig Start Date End Date Taking? Authorizing Provider   amLODIPine (NORVASC) 5 mg tablet Take 5 mg by mouth daily. Yes Provider, Historical   metoprolol succinate (TOPROL-XL) 100 mg tablet Take 1 Tablet by mouth daily. 7/7/21  Yes Nathanael Mathews MD   furosemide (LASIX) 20 mg tablet Take 1 Tablet by mouth daily. 7/7/21  Yes Nathanael Mathews MD   rosuvastatin (CRESTOR) 20 mg tablet Take 1 Tab by mouth nightly. 11/9/20  Yes Nathanael Mathews MD   losartan (COZAAR) 100 mg tablet Take 1 Tab by mouth daily. 11/9/20  Yes Nathanael Mathews MD   omeprazole (PRILOSEC) 10 mg capsule Take 10 mg by mouth daily. Yes Provider, Historical   latanoprost (XALATAN) 0.005 % ophthalmic solution Administer 1 Drop to both eyes nightly. Yes Provider, Historical   dorzolamide HCl/timolol maleat (DORZOLAMIDE-TIMOLOL OP) Apply 1 Drop to eye two (2) times a day. Both eyes   Yes Provider, Historical   pregabalin (LYRICA) 50 mg capsule Take 50 mg by mouth two (2) times a day. Yes Provider, Historical   diphenhydrAMINE (BENADRYL ALLERGY) 25 mg tablet Take 25 mg by mouth every six (6) hours as needed for Sleep. Yes Provider, Historical   glipiZIDE SR (GLUCOTROL XL) 2.5 mg CR tablet Take 2.5 mg by mouth daily. Yes Provider, Historical   aspirin delayed-release 81 mg tablet Take 81 mg by mouth daily. Yes Provider, Historical   levothyroxine (SYNTHROID) 25 mcg tablet Take 50 mcg by mouth Daily (before breakfast). Yes Provider, Historical   cyanocobalamin (VITAMIN B-12) 1,000 mcg tablet Take 2,500 mcg by mouth daily. Yes Provider, Historical   cetirizine (ZYRTEC) 10 mg tablet Take 10 mg by mouth daily as needed. Provider, Historical   acetaminophen (TYLENOL) 500 mg tablet Take 500 mg by mouth every six (6) hours as needed for Pain.     Provider, Historical   ascorbic acid, vitamin C, (VITAMIN C) 500 mg tablet Take 500 mg by mouth daily. Occasionally    Provider, Historical   docusate sodium (COLACE) 100 mg capsule Take 100 mg by mouth as needed for Constipation. Patient not taking: Reported on 3/6/2022    Provider, Historical     Allergies   Allergen Reactions    Amoxicillin-Pot Clavulanate Unknown (comments)     rash    Penicillins Hives    Sulfamethoxazole-Trimethoprim Rash        Objective:     Vitals:  Blood pressure 126/75, pulse 72, temperature 98.5 °F (36.9 °C), resp. rate 16, height 5' 1.5\" (1.562 m), weight 200 lb (90.7 kg), SpO2 98 %. Temp (24hrs), Av.3 °F (36.8 °C), Min:97.5 °F (36.4 °C), Max:99.3 °F (37.4 °C)      Intake and Output:   1901 -  0700  In: 0   Out: 500 [Urine:500]      Labs/Studies:  Recent Results (from the past 72 hour(s))   TYPE & SCREEN    Collection Time: 22  9:42 PM   Result Value Ref Range    Crossmatch Expiration 2022,2359     ABO/Rh(D) B POSITIVE     Antibody screen NEG    PROCALCITONIN    Collection Time: 22  9:42 PM   Result Value Ref Range    Procalcitonin 0.55 (H) 0.00 - 0.49 ng/mL   CBC WITH AUTOMATED DIFF    Collection Time: 22  9:45 PM   Result Value Ref Range    WBC 19.1 (H) 4.3 - 11.1 K/uL    RBC 3.56 (L) 4.05 - 5.2 M/uL    HGB 10.6 (L) 11.7 - 15.4 g/dL    HCT 32.9 (L) 35.8 - 46.3 %    MCV 92.4 79.6 - 97.8 FL    MCH 29.8 26.1 - 32.9 PG    MCHC 32.2 31.4 - 35.0 g/dL    RDW 14.1 11.9 - 14.6 %    PLATELET 182 128 - 657 K/uL    MPV 10.4 9.4 - 12.3 FL    ABSOLUTE NRBC 0.00 0.0 - 0.2 K/uL    DF AUTOMATED      NEUTROPHILS 71 43 - 78 %    LYMPHOCYTES 14 13 - 44 %    MONOCYTES 14 (H) 4.0 - 12.0 %    EOSINOPHILS 0 (L) 0.5 - 7.8 %    BASOPHILS 0 0.0 - 2.0 %    IMMATURE GRANULOCYTES 1 0.0 - 5.0 %    ABS. NEUTROPHILS 13.7 (H) 1.7 - 8.2 K/UL    ABS. LYMPHOCYTES 2.6 0.5 - 4.6 K/UL    ABS. MONOCYTES 2.6 (H) 0.1 - 1.3 K/UL    ABS. EOSINOPHILS 0.0 0.0 - 0.8 K/UL    ABS. BASOPHILS 0.0 0.0 - 0.2 K/UL    ABS. IMM.  GRANS. 0.2 0.0 - 0.5 K/UL PROTHROMBIN TIME + INR    Collection Time: 03/06/22  9:45 PM   Result Value Ref Range    Prothrombin time 13.7 12.6 - 14.5 sec    INR 1.0     METABOLIC PANEL, COMPREHENSIVE    Collection Time: 03/06/22  9:45 PM   Result Value Ref Range    Sodium 134 (L) 136 - 145 mmol/L    Potassium 4.4 3.5 - 5.1 mmol/L    Chloride 102 98 - 107 mmol/L    CO2 23 21 - 32 mmol/L    Anion gap 9 7 - 16 mmol/L    Glucose 137 (H) 65 - 100 mg/dL    BUN 32 (H) 8 - 23 MG/DL    Creatinine 1.50 (H) 0.6 - 1.0 MG/DL    GFR est AA 43 (L) >60 ml/min/1.73m2    GFR est non-AA 35 (L) >60 ml/min/1.73m2    Calcium 8.6 8.3 - 10.4 MG/DL    Bilirubin, total 0.7 0.2 - 1.1 MG/DL    ALT (SGPT) 20 12 - 65 U/L    AST (SGOT) 34 15 - 37 U/L    Alk. phosphatase 116 50 - 136 U/L    Protein, total 7.1 6.3 - 8.2 g/dL    Albumin 2.8 (L) 3.2 - 4.6 g/dL    Globulin 4.3 (H) 2.3 - 3.5 g/dL    A-G Ratio 0.7 (L) 1.2 - 3.5     MAGNESIUM    Collection Time: 03/06/22  9:45 PM   Result Value Ref Range    Magnesium 2.4 1.8 - 2.4 mg/dL   URINALYSIS W/ RFLX MICROSCOPIC    Collection Time: 03/06/22 10:35 PM   Result Value Ref Range    Color YELLOW      Appearance TURBID      Specific gravity 1.013 1.001 - 1.023      pH (UA) 5.5 5.0 - 9.0      Protein 30 (A) NEG mg/dL    Glucose Negative mg/dL    Ketone Negative NEG mg/dL    Bilirubin Negative NEG      Blood MODERATE (A) NEG      Urobilinogen 0.2 0.2 - 1.0 EU/dL    Nitrites Positive (A) NEG      Leukocyte Esterase LARGE (A) NEG      WBC >100 0 /hpf    Bacteria 1+ (H) 0 /hpf    Other observations RESULTS VERIFIED MANUALLY     CULTURE, URINE    Collection Time: 03/06/22 10:35 PM    Specimen: Clean catch; Urine   Result Value Ref Range    Special Requests: NO SPECIAL REQUESTS      Culture result:        NO GROWTH AFTER SHORT PERIOD OF INCUBATION. FURTHER RESULTS TO FOLLOW AFTER OVERNIGHT INCUBATION.    GLUCOSE, POC    Collection Time: 03/07/22  8:07 AM   Result Value Ref Range    Glucose (POC) 120 (H) 65 - 100 mg/dL    Performed by Sindi    LACTIC ACID    Collection Time: 03/07/22  9:19 AM   Result Value Ref Range    Lactic acid 1.5 0.4 - 2.0 MMOL/L   NT-PRO BNP    Collection Time: 03/07/22  9:19 AM   Result Value Ref Range    NT pro-BNP 3,259 (H) <450 PG/ML   GLUCOSE, POC    Collection Time: 03/07/22 11:40 AM   Result Value Ref Range    Glucose (POC) 114 (H) 65 - 100 mg/dL    Performed by Sindi      Ambulation:  Activity and Safety  Activity Level: Up with Assistance  Ambulate: No (Comment)  Activity: In bed  Activity Assistance: Partial (two people)  Weight Bearing Status: WBAT (Weight Bearing as Tolerated)  Mode of Transportation: Stretcher  Repositioned: Head of bed elevated (degrees)  Patient Turned: Turned on Right Side (with wedge )  Head of Bed Elevated: HOB 90  Activity Response: Tolerated well  Assistive Device: Walker (comment)  Safety Measures: Bed/Chair-Wheels locked,Bed in low position,Call light within reach,Side rails X 3     Impression / Assessment:     Principal Problem:    Humerus fracture (3/6/2022)    Active Problems:    Chronic kidney disease (2/18/2016)      Overview: Last Assessment & Plan: This is likely from diabetic nephropathy and long-standing diabetes       mellitus type 2      Continue current care and give IV fluids overnight. Repeat BMP in the morning. Diabetic polyneuropathy ([facility] [address] (2/1/2017)      Overview: Last Assessment & Plan:       The patient's clinical history and presentation is more consistent with a       diabetic neuropathy that is new onset in this patient with long-standing       diabetes mellitus type 2 and diabetic nephropathy. I will start her on Lyrica therapy starting today. Physical therapy to evaluate for mobility on this drug. We will continue to follow closely. No clinical signs or symptoms of gout or cellulitis. Type 2 diabetes mellitus ([address] (4/5/2017)      Overview: Last Assessment & Plan:       Check hemoglobin A1c. SSI with FSBG checks qAC and qhs. Adjust insulin as needed based on FSBG results. Diabetic diet and education as needed. Hypoactive thyroid (4/5/2017)      Overview: Last Assessment & Plan:       Continue home thyroid replacement meds. Check TSH in the AM and consider med adjustments based on results. Class 2 severe obesity due to excess calories with serious comorbidity in adult [facility]) (4/5/2017)      Essential hypertension (5/4/2017)      Coronary artery disease involving native coronary artery of native heart without angina pectoris (5/31/2017)      UTI (urinary tract infection) (3/6/2022)      Fall (3/6/2022)      Adult failure to thrive (3/7/2022)      Acute kidney injury superimposed on chronic kidney disease ([facility] [address] (3/7/2022)    s/p closed  L Humerus fx     Plan / Recommendations / Medical Decision Making:     Recommendations:   Continue Acute Rehab Program  Coordination of rehab / medical care  Counseling of PM&R care issues management  Monitoring and management of medical conditions per plan of care / orders  -Patient has NO medical necessity to require a physician closely monitoring her condition throughout her rehab coarse. Delayed pesentation of a non op left humerus fx. DOUG on CKD , suspect improvement with fluid rehydration and holding nephrotoxic meds. -recommend subacute rehab in a skilled setting. She can be cared for at a lower level of care and does not meet criteria for Inpt Aggressive rehab. -EHR review> 30min  Reviewed Therapies / Jessica Cabrera / Gabriella Doyle / Records  Thank you very much for this referral. We appreciate the opportunity to participate in this patient's care.

## 2022-03-07 NOTE — PROGRESS NOTES
Chart reviewed by CM for discharge planning  Patient lives alone, family live close by. [employer]tha [employer] and medical alert bractlet was not functioning properly and patient was on the ground for 2/12 to 3 hrs until family member found patient. Patient lives alone in a one level home, 2 steps to enter, uses RW in home and [facility] outside home, tub shower with transfer bench, independent for ADLs and household IADLs,  Niece/other family members come to assist frequently. Referral sent to 9th floor for sub acute patient rehab as recommended by both PT and OT  Will continue to follow for discharge planning needs  Please consult  if any new issues arise    Discharge plan is undetermined at this time. 9th floor vs [facility]    Care Management Interventions  PCP Verified by CM:  Yes  Transition of Care Consult (CM Consult): Discharge Planning  Discharge Durable Medical Equipment: No  Physical Therapy Consult: Yes  Occupational Therapy Consult: Yes  Speech Therapy Consult: No  Support Systems: Other Family Member(s)  The Plan for Transition of Care is Related to the Following Treatment Goals : return to baseline  The Patient and/or Patient Representative was Provided with a Choice of Provider and Agrees with the Discharge Plan?: Yes  Freedom of Choice List was Provided with Basic Dialogue that Supports the Patient's Individualized Plan of Care/Goals, Treatment Preferences and Shares the Quality Data Associated with the Providers?: Yes   Resource Information Provided?: No  Discharge Location  Patient Expects to be Discharged to[de-identified] Unable to determine at this time

## 2022-03-07 NOTE — WOUND CARE
Patient seen for coccyx wound present on admission. It is dark purple with epidermis peeling off currently. Measured 1.7x1.5x0 cm. Silicone foam in use and appropriate. Will order wedge to assist with turning. Seen with primary nurse. Appreciated her assistance. Will continue to monitor. Discussed with patient, all questions answered.

## 2022-03-07 NOTE — ED PROVIDER NOTES
[address] Sonia Adame is a 78 y.o. female seen on 3/6/2022 in the [facility] EMERGENCY DEPT in room ER13/13. Chief Complaint   Patient presents with    Fall    Head Injury     HPI: 78-year-old  female presented to the emergency department via EMS with complaints of fall that occurred on Thursday (4 days ago) patient states that she was walking down her hallway using her walker and then fell onto the ground. She does not know how she fell and denies being lightheaded or dizzy prior to falling. She fell on her left side and has had a left arm pain since her fall. She is unable to use her arm secondary to pain. Patient did hit her head but denies losing consciousness. She is not on blood thinners. Patient has had a very difficult time ambulating since her fall as she uses a walker for ambulation. Patient states that she is not able to pull herself up or use the walker secondary to pain in her left arm. She does have swelling and bruising to her left shoulder and humerus. She denies any fevers, chills, nausea, vomiting, diarrhea, chest pain, shortness of breath or any other concerns. Patient is very pale and does have a history of anemia. She denies any dark tarry stools or any other concerns. Patient is right-hand dominant. X-rays were performed prior to evaluating patient and the patient does have a proximal humerus fracture of her left upper arm. Patient's daughter is concerned the patient that she is unable to ambulate secondary to not been able to use her walker secondary to her arm fracture. She thinks patient may need rehab. Historian: Patient/daughter    REVIEW OF SYSTEMS     Review of Systems   Constitutional: Negative. HENT: Negative. Respiratory: Negative. Cardiovascular: Negative. Gastrointestinal: Negative. Genitourinary: Negative. Musculoskeletal: Positive for arthralgias and gait problem. Skin: Negative. Psychiatric/Behavioral: Negative. All other systems reviewed and are negative. PAST MEDICAL HISTORY     Past Medical History:   Diagnosis Date    Chronic anemia     Chronic anemia     Chronic back pain     DM (diabetes mellitus) (HCC)     GERD (gastroesophageal reflux disease)     Gout     Hyperlipidemia     Hypothyroidism     Hypothyroidism     Obesity      Past Surgical History:   Procedure Laterality Date    HX HEART CATHETERIZATION       Social History     Socioeconomic History    Marital status:    Tobacco Use    Smoking status: Never Smoker    Smokeless tobacco: Never Used     Prior to Admission Medications   Prescriptions Last Dose Informant Patient Reported? Taking?   acetaminophen (TYLENOL) 500 mg tablet   Yes No   Sig: Take 500 mg by mouth every six (6) hours as needed for Pain. amLODIPine (NORVASC) 5 mg tablet   Yes No   Sig: Take 5 mg by mouth daily. ascorbic acid, vitamin C, (VITAMIN C) 500 mg tablet   Yes No   Sig: Take 500 mg by mouth daily. Occasionally   aspirin delayed-release 81 mg tablet   Yes No   Sig: Take 81 mg by mouth daily. cetirizine (ZYRTEC) 10 mg tablet   Yes No   Sig: Take 10 mg by mouth daily as needed. cyanocobalamin (VITAMIN B-12) 1,000 mcg tablet   Yes No   Sig: Take 2,500 mcg by mouth daily. diphenhydrAMINE (BENADRYL ALLERGY) 25 mg tablet   Yes No   Sig: Take 25 mg by mouth every six (6) hours as needed for Sleep.   docusate sodium (COLACE) 100 mg capsule   Yes No   Sig: Take 100 mg by mouth as needed for Constipation. dorzolamide HCl/timolol maleat (DORZOLAMIDE-TIMOLOL OP)   Yes No   Sig: Apply 1 Drop to eye two (2) times a day. Both eyes   furosemide (LASIX) 20 mg tablet   No No   Sig: Take 1 Tablet by mouth daily. glipiZIDE SR (GLUCOTROL XL) 2.5 mg CR tablet   Yes No   Sig: Take 2.5 mg by mouth daily. latanoprost (XALATAN) 0.005 % ophthalmic solution   Yes No   Sig: Administer 1 Drop to both eyes nightly.    levothyroxine (SYNTHROID) 25 mcg tablet   Yes No   Sig: Take 50 mcg by mouth Daily (before breakfast). losartan (COZAAR) 100 mg tablet   No No   Sig: Take 1 Tab by mouth daily. metoprolol succinate (TOPROL-XL) 100 mg tablet   No No   Sig: Take 1 Tablet by mouth daily. omeprazole (PRILOSEC) 10 mg capsule   Yes No   Sig: Take 10 mg by mouth daily. pregabalin (LYRICA) 50 mg capsule   Yes No   Sig: Take 50 mg by mouth two (2) times a day. rosuvastatin (CRESTOR) 20 mg tablet   No No   Sig: Take 1 Tab by mouth nightly. Facility-Administered Medications: None     Allergies   Allergen Reactions    Amoxicillin-Pot Clavulanate Unknown (comments)     rash    Penicillins Hives    Sulfamethoxazole-Trimethoprim Rash        PHYSICAL EXAM       Vitals:    03/06/22 1800 03/06/22 2102   BP: 100/66    Pulse: 66    Resp: 18    Temp: 97.5 °F (36.4 °C)    SpO2: 100% 99%    Vital signs were reviewed. Physical Exam  Vitals and nursing note reviewed. Constitutional:       General: She is not in acute distress. Appearance: Normal appearance. She is not ill-appearing or toxic-appearing. HENT:      Head: Normocephalic. Comments: Small area of ecchymosis to left forehead above and lateral to left orbit. No tenderness to palpation     Nose: Nose normal.      Mouth/Throat:      Mouth: Mucous membranes are moist.   Eyes:      Extraocular Movements: Extraocular movements intact. Pupils: Pupils are equal, round, and reactive to light. Comments: Pale conjunctiva   Cardiovascular:      Rate and Rhythm: Normal rate and regular rhythm. Pulses: Normal pulses. Heart sounds: Murmur (2 out of 7 holosystolic murmur) heard. Pulmonary:      Effort: Pulmonary effort is normal.      Breath sounds: Normal breath sounds. Abdominal:      Palpations: Abdomen is soft. Tenderness: There is no abdominal tenderness. There is no guarding or rebound.    Musculoskeletal:         General: Swelling, tenderness and signs of injury present. Cervical back: Normal range of motion. Comments: Significant ecchymosis and swelling to left upper arm. Inability to range of motion secondary to pain. Skin:     Coloration: Skin is pale. Neurological:      General: No focal deficit present. Mental Status: She is alert and oriented to person, place, and time. Mental status is at baseline. Psychiatric:         Mood and Affect: Mood normal.         Behavior: Behavior normal.         Thought Content: Thought content normal.         Judgment: Judgment normal.          MEDICAL DECISION MAKING     ED Course:    Orders Placed This Encounter    CT HEAD WO CONT    CT SPINE CERV WO CONT    XR HUMERUS LT    XR SHOULDER LT AP/LAT MIN 2 V    CBC WITH AUTOMATED DIFF    PROTHROMBIN TIME + INR    METABOLIC PANEL, COMPREHENSIVE    MAGNESIUM    URINALYSIS W/ RFLX MICROSCOPIC    APPLY SLING;  SPECIFY ONE TIME STAT    EKG, 12 LEAD, INITIAL    TYPE & SCREEN    SALINE LOCK IV ONE TIME STAT    sodium chloride 0.9 % bolus infusion 1,000 mL     Recent Results (from the past 8 hour(s))   TYPE & SCREEN    Collection Time: 03/06/22  9:42 PM   Result Value Ref Range    Crossmatch Expiration 03/09/2022,2359     ABO/Rh(D) B POSITIVE     Antibody screen NEG    CBC WITH AUTOMATED DIFF    Collection Time: 03/06/22  9:45 PM   Result Value Ref Range    WBC 19.1 (H) 4.3 - 11.1 K/uL    RBC 3.56 (L) 4.05 - 5.2 M/uL    HGB 10.6 (L) 11.7 - 15.4 g/dL    HCT 32.9 (L) 35.8 - 46.3 %    MCV 92.4 79.6 - 97.8 FL    MCH 29.8 26.1 - 32.9 PG    MCHC 32.2 31.4 - 35.0 g/dL    RDW 14.1 11.9 - 14.6 %    PLATELET 097 742 - 939 K/uL    MPV 10.4 9.4 - 12.3 FL    ABSOLUTE NRBC 0.00 0.0 - 0.2 K/uL    DF AUTOMATED      NEUTROPHILS 71 43 - 78 %    LYMPHOCYTES 14 13 - 44 %    MONOCYTES 14 (H) 4.0 - 12.0 %    EOSINOPHILS 0 (L) 0.5 - 7.8 %    BASOPHILS 0 0.0 - 2.0 %    IMMATURE GRANULOCYTES 1 0.0 - 5.0 %    ABS. NEUTROPHILS 13.7 (H) 1.7 - 8.2 K/UL    ABS.  LYMPHOCYTES 2.6 0.5 - 4.6 K/UL ABS. MONOCYTES 2.6 (H) 0.1 - 1.3 K/UL    ABS. EOSINOPHILS 0.0 0.0 - 0.8 K/UL    ABS. BASOPHILS 0.0 0.0 - 0.2 K/UL    ABS. IMM. GRANS. 0.2 0.0 - 0.5 K/UL   PROTHROMBIN TIME + INR    Collection Time: 03/06/22  9:45 PM   Result Value Ref Range    Prothrombin time 13.7 12.6 - 14.5 sec    INR 1.0     METABOLIC PANEL, COMPREHENSIVE    Collection Time: 03/06/22  9:45 PM   Result Value Ref Range    Sodium 134 (L) 136 - 145 mmol/L    Potassium 4.4 3.5 - 5.1 mmol/L    Chloride 102 98 - 107 mmol/L    CO2 23 21 - 32 mmol/L    Anion gap 9 7 - 16 mmol/L    Glucose 137 (H) 65 - 100 mg/dL    BUN 32 (H) 8 - 23 MG/DL    Creatinine 1.50 (H) 0.6 - 1.0 MG/DL    GFR est AA 43 (L) >60 ml/min/1.73m2    GFR est non-AA 35 (L) >60 ml/min/1.73m2    Calcium 8.6 8.3 - 10.4 MG/DL    Bilirubin, total 0.7 0.2 - 1.1 MG/DL    ALT (SGPT) 20 12 - 65 U/L    AST (SGOT) 34 15 - 37 U/L    Alk. phosphatase 116 50 - 136 U/L    Protein, total 7.1 6.3 - 8.2 g/dL    Albumin 2.8 (L) 3.2 - 4.6 g/dL    Globulin 4.3 (H) 2.3 - 3.5 g/dL    A-G Ratio 0.7 (L) 1.2 - 3.5     MAGNESIUM    Collection Time: 03/06/22  9:45 PM   Result Value Ref Range    Magnesium 2.4 1.8 - 2.4 mg/dL     XR SHOULDER LT AP/LAT MIN 2 V    Result Date: 3/6/2022  EXAM: 3 views of the left shoulder and 2 views of the left humerus. INDICATION: Fall Thursday with shoulder pain. COMPARISON: None. FINDINGS: Acute comminuted fracture transversely oriented of the surgical neck of the humerus with extension into the base of the greater tuberosity and with medial displacement of approximately 7 mm. Acromioclavicular joint is preserved. Humeral head remains in articulation with the glenoid. Left lung appears clear. 1. Acute comminuted fracture of the surgical neck of the humerus with medial displacement and with fracture lines extending into the base of the greater tuberosity. XR HUMERUS LT    Result Date: 3/6/2022  EXAM: 3 views of the left shoulder and 2 views of the left humerus.  INDICATION: Fall Thursday with shoulder pain. COMPARISON: None. FINDINGS: Acute comminuted fracture transversely oriented of the surgical neck of the humerus with extension into the base of the greater tuberosity and with medial displacement of approximately 7 mm. Acromioclavicular joint is preserved. Humeral head remains in articulation with the glenoid. Left lung appears clear. 1. Acute comminuted fracture of the surgical neck of the humerus with medial displacement and with fracture lines extending into the base of the greater tuberosity. CT HEAD WO CONT    Result Date: 3/6/2022  NONCONTRAST HEAD CT CLINICAL HISTORY:  Near syncopal episode after a fall on Thursday. TECHNIQUE:  Axial images were obtained with spiral technique. Radiation dose reduction was achieved using one or all of the following techniques: automated exposure control, weight-based dosing, iterative reconstruction. COMPARISON:  None. REPORT:   Standard noncontrast head CT demonstrates no definite intracranial mass effect, hemorrhage, or evidence of acute geographic infarction. The ventricles are normal in size and configuration, accounting for the patient's age. Orbits  and paranasal sinuses are clear where imaged. Bone windows demonstrate no definite fracture or destruction. NO ACUTE INTRACRANIAL ABNORMALITY OR CALVARIAL FRACTURE IDENTIFIED AT NONCONTRAST CT.     CT SPINE CERV WO CONT    Result Date: 3/6/2022  EXAM: CT of the cervical spine. INDICATION: Fall on Thursday, neck pain. COMPARISON: None. Multiple axial images were obtained through the cervical spine without intravenous contrast. Coronal and sagittal reformatted images were also reviewed. Radiation dose reduction techniques were used for this study: All CT scans performed at this facility use one or all of the following: Automated exposure control, adjustment of the mA and/or kVp according to patient's size, iterative reconstruction. FINDINGS: Advanced diffuse osteopenia. Moderate atlantodental interval degenerative change. Moderate multilevel facet arthropathy. No significant spondylolisthesis. Vertebral body heights appear preserved. No evidence of acute cervical spine fracture. No prevertebral soft tissue swelling. Moderate degenerative disc changes C5-C6 through C7-T1 otherwise mild multilevel degenerative disc changes of the cervical spine. 1. No evidence of acute cervical spine fracture. EKG interpretation personally: Rate 63. Normal sinus rhythm. Left bundle branch block. Normal QT interval.  ED Course as of 03/06/22 2314   Sun Mar 06, 2022   2312 Discussed with orthopedics and they will see in consult. Patient will be then to the hospitalist. [JL]      ED Course User Index  [JL] DO VINNY Baird  Number of Diagnoses or Management Options  Fall, initial encounter  Injury of head, initial encounter  Other closed nondisplaced fracture of proximal end of left humerus, initial encounter  Diagnosis management comments: 78-year-old female presented to the emergency department with complaints of left arm pain after a fall. Patient with proximal left humerus fracture. Normally patient will be discharged home with sling and follow-up with orthopedics as an outpatient however patient uses a walker for ambulation. She has been unable to ambulate secondary to her left arm pain for the past 4 days. Patient does have an elevated white blood cell count. Urine is pending. Patient will be admitted to the hospitalist and orthopedics will see patient in consult. Discussed with Ortho and they agreeable to this plan as well.        Amount and/or Complexity of Data Reviewed  Clinical lab tests: ordered and reviewed  Tests in the radiology section of CPT®: ordered and reviewed  Decide to obtain previous medical records or to obtain history from someone other than the patient: yes  Obtain history from someone other than the patient: yes  Review and summarize past medical records: yes  Discuss the patient with other providers: yes  Independent visualization of images, tracings, or specimens: yes    Patient Progress  Patient progress: stable        Disposition:  Admitted  Diagnosis:     ICD-10-CM ICD-9-CM   1. Fall, initial encounter  Majel Hodgkin. XXXA E888.9   2. Other closed nondisplaced fracture of proximal end of left humerus, initial encounter  S42.295A 812.09   3. Injury of head, initial encounter  S09.90XA 959.01   4. Leukocytosis, unspecified type  D72.829 288.60     ____________________________________________________________________  A portion of this note was generated using voice recognition dictation software. While the note has been reviewed for accuracy, please note certain words and phrases may not be transcribed as intended and some grammatical and/or typographical errors may be present.

## 2022-03-07 NOTE — ED NOTES
Spoke with dr Antonio Joseph about difficulty in obtaining blood from patient. States would like blood cultures drawn before antibiotics are started. Lab contacted to obtain blood cultures from patient when they arrived on the floor.

## 2022-03-07 NOTE — CONSULTS
[facility] Orthopedics  Consultation Note    Patient ID:  Name: Marlen Menezes  MRN: [id number]  AGE: 78 y.o.  : 1940    Date of Consultation:  2022  Referring Physician: Hospitalist    Subjective: Pt complains of left shoulder pain after sustaining a ground-level fall on Thursday. She says she normally wears a medical alert bracelet but either this was not working or she was not wearing it and that she laid on the floor following this fall for several hours until her niece came over. They presented to the [facility] Emergency Dept today as she has not been able to transfer at home since this injury. They were found to have closed left proximal humerus fracture. They were admitted by the hospitalist. They localizes their pain to the left shoulder radiates up towards her neck. She denies any elbow pain denies any neck pain. The patient is right-hand dominant. She sees Dr. Hattie Mayo at [address] for nonoperative management of the osteoarthritis of her knees and receives Euflexxa injections. They have no other orthopedic concerns at this time.       Past Medical History Includes:   Past Medical History:   Diagnosis Date    Chronic anemia     Chronic anemia     Chronic back pain     DM (diabetes mellitus) (HCC)     GERD (gastroesophageal reflux disease)     Gout     Hyperlipidemia     Hypothyroidism     Hypothyroidism     Obesity    ,   Past Surgical History:   Procedure Laterality Date    HX HEART CATHETERIZATION       Family History:   Family History   Problem Relation Age of Onset    Heart Disease Mother     Heart Disease Father     Heart Disease Brother       Social History:   Social History     Tobacco Use    Smoking status: Never Smoker    Smokeless tobacco: Never Used   Substance Use Topics    Alcohol use: Not on file       ALLERGIES:   Allergies   Allergen Reactions    Amoxicillin-Pot Clavulanate Unknown (comments)     rash    Penicillins Hives    Sulfamethoxazole-Trimethoprim Rash        Patient Medications    Current Facility-Administered Medications   Medication Dose Route Frequency    sodium chloride (NS) flush 5-40 mL  5-40 mL IntraVENous Q8H    sodium chloride (NS) flush 5-40 mL  5-40 mL IntraVENous PRN    acetaminophen (TYLENOL) tablet 650 mg  650 mg Oral Q6H PRN    Or    acetaminophen (TYLENOL) suppository 650 mg  650 mg Rectal Q6H PRN    polyethylene glycol (MIRALAX) packet 17 g  17 g Oral DAILY PRN    ondansetron (ZOFRAN ODT) tablet 4 mg  4 mg Oral Q8H PRN    Or    ondansetron (ZOFRAN) injection 4 mg  4 mg IntraVENous Q6H PRN    enoxaparin (LOVENOX) injection 30 mg  30 mg SubCUTAneous DAILY    tuberculin injection 5 Units  5 Units IntraDERMal ONCE    insulin lispro (HUMALOG) injection   SubCUTAneous AC&HS    acetaminophen (TYLENOL) tablet 500 mg  500 mg Oral Q6H PRN    amLODIPine (NORVASC) tablet 5 mg  5 mg Oral DAILY    aspirin delayed-release tablet 81 mg  81 mg Oral DAILY    cetirizine (ZYRTEC) tablet 10 mg  10 mg Oral DAILY PRN    dorzolamide-timoloL (COSOPT) 22.3-6.8 mg/mL ophthalmic solution 1 Drop  1 Drop Both Eyes BID    [Held by provider] furosemide (LASIX) tablet 20 mg  20 mg Oral DAILY    latanoprost (XALATAN) 0.005 % ophthalmic solution 1 Drop  1 Drop Both Eyes QHS    levothyroxine (SYNTHROID) tablet 50 mcg  50 mcg Oral ACB    [Held by provider] losartan (COZAAR) tablet 100 mg  100 mg Oral DAILY    metoprolol succinate (TOPROL-XL) XL tablet 100 mg  100 mg Oral DAILY    pantoprazole (PROTONIX) tablet 40 mg  40 mg Oral ACB    pregabalin (LYRICA) capsule 50 mg  50 mg Oral BID    rosuvastatin (CRESTOR) tablet 20 mg  20 mg Oral QHS    cefTRIAXone (ROCEPHIN) 1 g in 0.9% sodium chloride (MBP/ADV) 50 mL MBP  1 g IntraVENous NOW         Review of Systems:  A comprehensive review of systems was negative except for that written in the HPI.     Physical Exam: General: NAD, Alert, Oriented x 3   Mental Status: Appropriate   Psych: Normal Affect, Normal Mood    HEENT: Normal Cephalic/Atraumatic, PERRL   Lungs: Respirations even and unlabored, Breath Sounds were clear, no respiratory distress   Heart: Regular Rate and Rhythm   Vascular: Distal pulses intact, good capillary refill   Skin: No redness, No Rashes, Skin is dry   Musculoskeletal: On exam of the left upper extremity the left hand is neurovascularly intact she is able to move all of her digits independently. There is moderate swelling of the left shoulder with no wounds. Tenderness on palpation. Range of motion not assessed due to fracture. No pain on palpation of the olecranon or medial and lateral epicondyles. Has full active range of motion of her neck without pain.    Neuro: No gross deficits   Abdomen: Soft, Non tender, No distension      VITALS:   Patient Vitals for the past 8 hrs:   BP Temp Pulse Resp SpO2   22 0820 106/65 97.9 °F (36.6 °C) 66 16 95 %   22 0435 102/68 99.3 °F (37.4 °C) 67 16 97 %    , Temp (24hrs), Av.3 °F (36.8 °C), Min:97.5 °F (36.4 °C), Max:99.3 °F (37.4 °C)           Diagnosis   Patient Active Problem List   Diagnosis Code    Chronic anemia D64.9    Chronic kidney disease N18.9    Chronic pain associated with significant psychosocial dysfunction G89.4    Pressure ulcer, stage 2 ([facility]) L89.92    Diabetic polyneuropathy ([facility]) E11.42    Type 2 diabetes mellitus ([facility]) E11.9    Escherichia coli urinary tract infection N39.0, B96.20    Gastroesophageal reflux disease K21.9    Hypokalemia E87.6    Hypomagnesemia E83.42    Hypoactive thyroid E03.9    Leukocytes in urine R82.998    Class 2 severe obesity due to excess calories with serious comorbidity in adult ([facility]) E66.01    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    Urinary tract infection N39.0    Anemia associated with nutritional deficiency D53.9    Essential hypertension I10    Elevated brain natriuretic peptide (BNP) level R79.89    LBBB (left bundle branch block) I44.7    LVH (left ventricular hypertrophy) due to hypertensive disease I11.9    Coronary artery disease involving native coronary artery of native heart without angina pectoris I25.10    Hyperlipemia, mixed E78.2    Bronchitis J40    Debility R53.81    Weakness of both legs R29.898    Aortic valve sclerosis I35.8    LVH (left ventricular hypertrophy) I51.7    MGUS (monoclonal gammopathy of unknown significance) D47.2    UTI (urinary tract infection) N39.0    Humerus fracture S42.309A    Fall W19. [place] Adult failure to thrive R62.7          Assessment     Closed left proximal humerus fracture    Medical Decision Making:     X-rays and chart have been reviewed. The patient sustained a closed fracture of the left proximal humerus. She is being treated nonoperatively for this with a sling at the present time. She will likely require rehab placement as she does not appear to be able to care for herself at home. Continue medical management per hospitalist.  With regard to the shoulder that she can continue using the sling and ice as needed. We will recheck her in the office in 2 weeks or sooner if needed.        LIZETTE Fitzgerald  3/7/2022,  10:23 AM

## 2022-03-07 NOTE — H&P
Hospitalist History and Physical   Admit Date:  3/6/2022  8:22 PM   Name:  Carmencita Aguilar   Age:  78 y.o. Sex:  female  :  1940   MRN:  [id number]   Room:  ER    Presenting Complaint: Fall and Head Injury    Reason(s) for Admission: Fall [W19. XXXA]  Humerus fracture [S42.309A]  UTI (urinary tract infection) [N39.0]     History of Present Illness:   Carmencita Aguilar is a 78 y.o. female with a past medical history significant for hypertension, hypothyroidism and diabetes. The patient lives at home alone but has family members checking in on her. She walks with a walker she had a fall on Thursday. She denies feeling lightheaded dizzy or having any chest pain she states she has shortness of breath at baseline but it was not particularly worse at that time. She stayed on the ground for about 2-1/2 to 3 hours until family member found her because she injured her left arm and was not able to press her medical alert bracelet properly. The family member called the medic alert people and they came and helped the patient from the ground to chair. They did not think that her arm was broken as she had the use of her hand and fingers. However over the last several days she is not being able to walk with her walker and therefore family members were not able to care for her. She was brought to the hospital today with them requesting that she be placed for rehab. In the ER she was found to have a left proximal humeral fracture, and leukocytosis and a UTI. Hospitalist service requested for further inpatient management. review of Systems:  10 systems reviewed and negative except as noted in HPI. Assessment & Plan:      Active Problems:    UTI (urinary tract infection) (3/6/2022)  Send urine culture  Blood cultures also ordered by the ER  We will check a lactic acid and pro Gautam  Continue antibiotics  Tailor therapy based on culture results        Humerus fracture (3/6/2022)  Continue sling  Consult orthopedics        Fall (3/6/2022)  Of unclear etiology about 4 days ago  PT and OT eval  PPD placement  Case management consult for rehab placement      Shortness of breath  Patient states she is short of breath at baseline  We will check a chest x-ray  Check her BNP    Diabetes  Monitor blood sugars and cover her with insulin      Hypertension  Continue appropriate home medications    Hypothyroidism  Check her TSH  Continue appropriate home medications    DOUG  Avoid nephrotoxins  Consider IV fluids based on the BNP and chest x-ray     dispo/Discharge Planning:   Admit to the medicine inpatient unit  Anticipated 2-3 midnight stay      Diet: No diet orders on file cardiac diabetic  VTE ppx: Heparin  Code status: Prior    Hospital Problems as of 3/7/2022 Date Reviewed: 12/22/2021          Codes Class Noted - Resolved POA    UTI (urinary tract infection) ICD-10-CM: N39.0  ICD-9-CM: 599.0  3/6/2022 - Present Unknown        Humerus fracture ICD-10-CM: S42.309A  ICD-9-CM: 812.20  3/6/2022 - Present Unknown        Fall ICD-10-CM: W19. Chika Koroma  ICD-9-CM: E888.9  3/6/2022 - Present Unknown              Past History:  Past Medical History:   Diagnosis Date    Chronic anemia     Chronic anemia     Chronic back pain     DM (diabetes mellitus) (HCC)     GERD (gastroesophageal reflux disease)     Gout     Hyperlipidemia     Hypothyroidism     Hypothyroidism     Obesity      Past Surgical History:   Procedure Laterality Date    HX HEART CATHETERIZATION        Allergies   Allergen Reactions    Amoxicillin-Pot Clavulanate Unknown (comments)     rash    Penicillins Hives    Sulfamethoxazole-Trimethoprim Rash      Social History     Tobacco Use    Smoking status: Never Smoker    Smokeless tobacco: Never Used   Substance Use Topics    Alcohol use: Not on file      Family History   Problem Relation Age of Onset    Heart Disease Mother     Heart Disease Father     Heart Disease Brother       Family history reviewed and negative except as noted above. There is no immunization history on file for this patient. Prior to Admit Medications:  Current Outpatient Medications   Medication Instructions    acetaminophen (TYLENOL) 500 mg, Oral, EVERY 6 HOURS AS NEEDED    amLODIPine (NORVASC) 5 mg, Oral, DAILY    ascorbic acid (vitamin C) (VITAMIN C) 500 mg, Oral, DAILY, Occasionally    aspirin delayed-release 81 mg, Oral, DAILY    cetirizine (ZYRTEC) 10 mg, Oral, DAILY AS NEEDED    cyanocobalamin (VITAMIN B-12) 2,500 mcg, Oral, DAILY    diphenhydrAMINE (BENADRYL ALLERGY) 25 mg, Oral, EVERY 6 HOURS AS NEEDED    docusate sodium (COLACE) 100 mg, AS NEEDED    dorzolamide HCl/timolol maleat (DORZOLAMIDE-TIMOLOL OP) 1 Drop, Ophthalmic, 2 TIMES DAILY, Both eyes     furosemide (LASIX) 20 mg, Oral, DAILY    glipiZIDE SR (GLUCOTROL XL) 2.5 mg, Oral, DAILY    latanoprost (XALATAN) 0.005 % ophthalmic solution 1 Drop, Both Eyes, EVERY BEDTIME    levothyroxine (SYNTHROID) 50 mcg, Oral, DAILY BEFORE BREAKFAST    losartan (COZAAR) 100 mg, Oral, DAILY    metoprolol succinate (TOPROL-XL) 100 mg, Oral, DAILY    omeprazole (PRILOSEC) 10 mg, Oral, DAILY    pregabalin (LYRICA) 50 mg, Oral, 2 TIMES DAILY    rosuvastatin (CRESTOR) 20 mg, Oral, EVERY BEDTIME       Objective:     Patient Vitals for the past 24 hrs:   Temp Pulse Resp BP SpO2   03/06/22 2102 -- -- -- -- 99 %   03/06/22 1800 97.5 °F (36.4 °C) 66 18 100/66 100 %     Oxygen Therapy  O2 Sat (%): 99 % (03/06/22 2102)  O2 Device: None (Room air) (03/06/22 2102)    Estimated body mass index is 37.18 kg/m² as calculated from the following:    Height as of this encounter: 5' 1.5\" (1.562 m). Weight as of this encounter: 90.7 kg (200 lb). No intake or output data in the 24 hours ending 03/07/22 0016      Physical Exam:    Blood pressure 100/66, pulse 66, temperature 97.5 °F (36.4 °C), resp. rate 18, height 5' 1.5\" (1.562 m), weight 90.7 kg (200 lb), SpO2 99 %.   General: Well nourished. No overt distress  Head:  Normocephalic, atraumatic  Eyes:  Sclerae appear normal.  Pupils equally round. ENT:  Nares appear normal, no drainage. Moist oral mucosa  Neck:  No restricted ROM. Trachea midline   CV:   RRR. No m/r/g. No jugular venous distension. Lungs:   CTAB. No wheezing, rhonchi, or rales. Respirations even, unlabored  Abdomen: Bowel sounds present. Soft, nontender, nondistended. Extremities: No cyanosis or clubbing. No edema left arm in a sling  Skin:     No rashes and normal coloration. Warm and dry. Neuro:  CN II-XII grossly intact. Sensation intact. A&Ox3  Psych:  Normal mood and affect. I have reviewed ordered lab tests and independently visualized imaging below:    Last 24hr Labs:  Recent Results (from the past 24 hour(s))   TYPE & SCREEN    Collection Time: 03/06/22  9:42 PM   Result Value Ref Range    Crossmatch Expiration 03/09/2022,2359     ABO/Rh(D) B POSITIVE     Antibody screen NEG    CBC WITH AUTOMATED DIFF    Collection Time: 03/06/22  9:45 PM   Result Value Ref Range    WBC 19.1 (H) 4.3 - 11.1 K/uL    RBC 3.56 (L) 4.05 - 5.2 M/uL    HGB 10.6 (L) 11.7 - 15.4 g/dL    HCT 32.9 (L) 35.8 - 46.3 %    MCV 92.4 79.6 - 97.8 FL    MCH 29.8 26.1 - 32.9 PG    MCHC 32.2 31.4 - 35.0 g/dL    RDW 14.1 11.9 - 14.6 %    PLATELET 751 446 - 748 K/uL    MPV 10.4 9.4 - 12.3 FL    ABSOLUTE NRBC 0.00 0.0 - 0.2 K/uL    DF AUTOMATED      NEUTROPHILS 71 43 - 78 %    LYMPHOCYTES 14 13 - 44 %    MONOCYTES 14 (H) 4.0 - 12.0 %    EOSINOPHILS 0 (L) 0.5 - 7.8 %    BASOPHILS 0 0.0 - 2.0 %    IMMATURE GRANULOCYTES 1 0.0 - 5.0 %    ABS. NEUTROPHILS 13.7 (H) 1.7 - 8.2 K/UL    ABS. LYMPHOCYTES 2.6 0.5 - 4.6 K/UL    ABS. MONOCYTES 2.6 (H) 0.1 - 1.3 K/UL    ABS. EOSINOPHILS 0.0 0.0 - 0.8 K/UL    ABS. BASOPHILS 0.0 0.0 - 0.2 K/UL    ABS. IMM.  GRANS. 0.2 0.0 - 0.5 K/UL   PROTHROMBIN TIME + INR    Collection Time: 03/06/22  9:45 PM   Result Value Ref Range    Prothrombin time 13.7 12.6 - 14.5 sec    INR 1.0     METABOLIC PANEL, COMPREHENSIVE    Collection Time: 03/06/22  9:45 PM   Result Value Ref Range    Sodium 134 (L) 136 - 145 mmol/L    Potassium 4.4 3.5 - 5.1 mmol/L    Chloride 102 98 - 107 mmol/L    CO2 23 21 - 32 mmol/L    Anion gap 9 7 - 16 mmol/L    Glucose 137 (H) 65 - 100 mg/dL    BUN 32 (H) 8 - 23 MG/DL    Creatinine 1.50 (H) 0.6 - 1.0 MG/DL    GFR est AA 43 (L) >60 ml/min/1.73m2    GFR est non-AA 35 (L) >60 ml/min/1.73m2    Calcium 8.6 8.3 - 10.4 MG/DL    Bilirubin, total 0.7 0.2 - 1.1 MG/DL    ALT (SGPT) 20 12 - 65 U/L    AST (SGOT) 34 15 - 37 U/L    Alk. phosphatase 116 50 - 136 U/L    Protein, total 7.1 6.3 - 8.2 g/dL    Albumin 2.8 (L) 3.2 - 4.6 g/dL    Globulin 4.3 (H) 2.3 - 3.5 g/dL    A-G Ratio 0.7 (L) 1.2 - 3.5     MAGNESIUM    Collection Time: 03/06/22  9:45 PM   Result Value Ref Range    Magnesium 2.4 1.8 - 2.4 mg/dL   URINALYSIS W/ RFLX MICROSCOPIC    Collection Time: 03/06/22 10:35 PM   Result Value Ref Range    Color YELLOW      Appearance TURBID      Specific gravity 1.013 1.001 - 1.023      pH (UA) 5.5 5.0 - 9.0      Protein 30 (A) NEG mg/dL    Glucose Negative mg/dL    Ketone Negative NEG mg/dL    Bilirubin Negative NEG      Blood MODERATE (A) NEG      Urobilinogen 0.2 0.2 - 1.0 EU/dL    Nitrites Positive (A) NEG      Leukocyte Esterase LARGE (A) NEG      WBC >100 0 /hpf    Bacteria 1+ (H) 0 /hpf    Other observations RESULTS VERIFIED MANUALLY         All Micro Results     Procedure Component Value Units Date/Time    CULTURE, BLOOD [[id number]]     Order Status: Sent Specimen: Blood     CULTURE, URINE [[id number]]     Order Status: Sent Specimen: Urine from Clean catch     CULTURE, BLOOD [[id number]]     Order Status: Sent Specimen: Blood           Other Studies:  XR SHOULDER LT AP/LAT MIN 2 V    Result Date: 3/6/2022  EXAM: 3 views of the left shoulder and 2 views of the left humerus. INDICATION: Fall Thursday with shoulder pain. COMPARISON: None.  FINDINGS: Acute comminuted fracture transversely oriented of the surgical neck of the humerus with extension into the base of the greater tuberosity and with medial displacement of approximately 7 mm. Acromioclavicular joint is preserved. Humeral head remains in articulation with the glenoid. Left lung appears clear. 1. Acute comminuted fracture of the surgical neck of the humerus with medial displacement and with fracture lines extending into the base of the greater tuberosity. XR HUMERUS LT    Result Date: 3/6/2022  EXAM: 3 views of the left shoulder and 2 views of the left humerus. INDICATION: Fall Thursday with shoulder pain. COMPARISON: None. FINDINGS: Acute comminuted fracture transversely oriented of the surgical neck of the humerus with extension into the base of the greater tuberosity and with medial displacement of approximately 7 mm. Acromioclavicular joint is preserved. Humeral head remains in articulation with the glenoid. Left lung appears clear. 1. Acute comminuted fracture of the surgical neck of the humerus with medial displacement and with fracture lines extending into the base of the greater tuberosity. CT HEAD WO CONT    Result Date: 3/6/2022  NONCONTRAST HEAD CT CLINICAL HISTORY:  Near syncopal episode after a fall on Thursday. TECHNIQUE:  Axial images were obtained with spiral technique. Radiation dose reduction was achieved using one or all of the following techniques: automated exposure control, weight-based dosing, iterative reconstruction. COMPARISON:  None. REPORT:   Standard noncontrast head CT demonstrates no definite intracranial mass effect, hemorrhage, or evidence of acute geographic infarction. The ventricles are normal in size and configuration, accounting for the patient's age. Orbits  and paranasal sinuses are clear where imaged. Bone windows demonstrate no definite fracture or destruction.      NO ACUTE INTRACRANIAL ABNORMALITY OR CALVARIAL FRACTURE IDENTIFIED AT NONCONTRAST CT.     CT SPINE CERV WO CONT    Result Date: 3/6/2022  EXAM: CT of the cervical spine. INDICATION: Fall on Thursday, neck pain. COMPARISON: None. Multiple axial images were obtained through the cervical spine without intravenous contrast. Coronal and sagittal reformatted images were also reviewed. Radiation dose reduction techniques were used for this study: All CT scans performed at this facility use one or all of the following: Automated exposure control, adjustment of the mA and/or kVp according to patient's size, iterative reconstruction. FINDINGS: Advanced diffuse osteopenia. Moderate atlantodental interval degenerative change. Moderate multilevel facet arthropathy. No significant spondylolisthesis. Vertebral body heights appear preserved. No evidence of acute cervical spine fracture. No prevertebral soft tissue swelling. Moderate degenerative disc changes C5-C6 through C7-T1 otherwise mild multilevel degenerative disc changes of the cervical spine. 1. No evidence of acute cervical spine fracture. Medications Administered     sodium chloride 0.9 % bolus infusion 1,000 mL     Admin Date  03/06/2022 Action  New Bag Dose  1,000 mL Route  IntraVENous Administered By  Juanita Boles RN                Signed:  Jens Stevens MD    Part of this note may have been written by using a voice dictation software. The note has been proof read but may still contain some grammatical/other typographical errors.

## 2022-03-07 NOTE — PROGRESS NOTES
IV Access    Called for assistance with IV access. 20g, 1.75 inch catheter placed with ultrasound guidance x 1 attempt to patient's right forearm. Blood return noted and PIV flushed without difficulty. Pt delon well. [address]

## 2022-03-08 LAB
ALBUMIN SERPL-MCNC: 2.1 G/DL (ref 3.2–4.6)
ALBUMIN/GLOB SERPL: 0.6 {RATIO} (ref 1.2–3.5)
ALP SERPL-CCNC: 114 U/L (ref 50–136)
ALT SERPL-CCNC: 11 U/L (ref 12–65)
ANION GAP SERPL CALC-SCNC: 10 MMOL/L (ref 7–16)
AST SERPL-CCNC: 22 U/L (ref 15–37)
BASOPHILS # BLD: 0 K/UL (ref 0–0.2)
BASOPHILS NFR BLD: 0 % (ref 0–2)
BILIRUB SERPL-MCNC: 0.9 MG/DL (ref 0.2–1.1)
BUN SERPL-MCNC: 26 MG/DL (ref 8–23)
CALCIUM SERPL-MCNC: 8.4 MG/DL (ref 8.3–10.4)
CHLORIDE SERPL-SCNC: 107 MMOL/L (ref 98–107)
CO2 SERPL-SCNC: 19 MMOL/L (ref 21–32)
CREAT SERPL-MCNC: 1.1 MG/DL (ref 0.6–1)
DIFFERENTIAL METHOD BLD: ABNORMAL
EOSINOPHIL # BLD: 0.1 K/UL (ref 0–0.8)
EOSINOPHIL NFR BLD: 1 % (ref 0.5–7.8)
ERYTHROCYTE [DISTWIDTH] IN BLOOD BY AUTOMATED COUNT: 14 % (ref 11.9–14.6)
GLOBULIN SER CALC-MCNC: 3.7 G/DL (ref 2.3–3.5)
GLUCOSE BLD STRIP.AUTO-MCNC: 101 MG/DL (ref 65–100)
GLUCOSE BLD STRIP.AUTO-MCNC: 102 MG/DL (ref 65–100)
GLUCOSE BLD STRIP.AUTO-MCNC: 113 MG/DL (ref 65–100)
GLUCOSE BLD STRIP.AUTO-MCNC: 134 MG/DL (ref 65–100)
GLUCOSE BLD STRIP.AUTO-MCNC: 156 MG/DL (ref 65–100)
GLUCOSE SERPL-MCNC: 93 MG/DL (ref 65–100)
HCT VFR BLD AUTO: 36.5 % (ref 35.8–46.3)
HGB BLD-MCNC: 12 G/DL (ref 11.7–15.4)
IMM GRANULOCYTES # BLD AUTO: 0.1 K/UL (ref 0–0.5)
IMM GRANULOCYTES NFR BLD AUTO: 1 % (ref 0–5)
LYMPHOCYTES # BLD: 2.4 K/UL (ref 0.5–4.6)
LYMPHOCYTES NFR BLD: 19 % (ref 13–44)
MCH RBC QN AUTO: 29.9 PG (ref 26.1–32.9)
MCHC RBC AUTO-ENTMCNC: 32.9 G/DL (ref 31.4–35)
MCV RBC AUTO: 91 FL (ref 79.6–97.8)
MM INDURATION POC: 0 MM (ref 0–5)
MONOCYTES # BLD: 1.2 K/UL (ref 0.1–1.3)
MONOCYTES NFR BLD: 10 % (ref 4–12)
NEUTS SEG # BLD: 8.9 K/UL (ref 1.7–8.2)
NEUTS SEG NFR BLD: 70 % (ref 43–78)
NRBC # BLD: 0 K/UL (ref 0–0.2)
PLATELET # BLD AUTO: 147 K/UL (ref 150–450)
PMV BLD AUTO: 10 FL (ref 9.4–12.3)
POTASSIUM SERPL-SCNC: 4 MMOL/L (ref 3.5–5.1)
PPD POC: NEGATIVE NEGATIVE
PROT SERPL-MCNC: 5.8 G/DL (ref 6.3–8.2)
RBC # BLD AUTO: 4.01 M/UL (ref 4.05–5.2)
SERVICE CMNT-IMP: ABNORMAL
SODIUM SERPL-SCNC: 136 MMOL/L (ref 136–145)
WBC # BLD AUTO: 12.8 K/UL (ref 4.3–11.1)

## 2022-03-08 PROCEDURE — 82962 GLUCOSE BLOOD TEST: CPT

## 2022-03-08 PROCEDURE — 80053 COMPREHEN METABOLIC PANEL: CPT

## 2022-03-08 PROCEDURE — 65270000029 HC RM PRIVATE

## 2022-03-08 PROCEDURE — 74011000250 HC RX REV CODE- 250: Performed by: INTERNAL MEDICINE

## 2022-03-08 PROCEDURE — 74011636637 HC RX REV CODE- 636/637: Performed by: INTERNAL MEDICINE

## 2022-03-08 PROCEDURE — 74011250636 HC RX REV CODE- 250/636: Performed by: INTERNAL MEDICINE

## 2022-03-08 PROCEDURE — 36415 COLL VENOUS BLD VENIPUNCTURE: CPT

## 2022-03-08 PROCEDURE — 97530 THERAPEUTIC ACTIVITIES: CPT

## 2022-03-08 PROCEDURE — 74011250637 HC RX REV CODE- 250/637: Performed by: HOSPITALIST

## 2022-03-08 PROCEDURE — 85025 COMPLETE CBC W/AUTO DIFF WBC: CPT

## 2022-03-08 PROCEDURE — 74011250637 HC RX REV CODE- 250/637: Performed by: INTERNAL MEDICINE

## 2022-03-08 PROCEDURE — 74011000258 HC RX REV CODE- 258: Performed by: INTERNAL MEDICINE

## 2022-03-08 RX ORDER — ENOXAPARIN SODIUM 100 MG/ML
40 INJECTION SUBCUTANEOUS DAILY
Status: DISCONTINUED | OUTPATIENT
Start: 2022-03-09 | End: 2022-03-14 | Stop reason: HOSPADM

## 2022-03-08 RX ADMIN — ROSUVASTATIN CALCIUM 20 MG: 20 TABLET, FILM COATED ORAL at 21:25

## 2022-03-08 RX ADMIN — SODIUM CHLORIDE 100 ML/HR: 900 INJECTION, SOLUTION INTRAVENOUS at 05:32

## 2022-03-08 RX ADMIN — PANTOPRAZOLE SODIUM 40 MG: 40 TABLET, DELAYED RELEASE ORAL at 06:30

## 2022-03-08 RX ADMIN — LEVOTHYROXINE SODIUM 50 MCG: 0.05 TABLET ORAL at 06:30

## 2022-03-08 RX ADMIN — SODIUM CHLORIDE 100 ML/HR: 900 INJECTION, SOLUTION INTRAVENOUS at 15:27

## 2022-03-08 RX ADMIN — ASPIRIN 81 MG: 81 TABLET ORAL at 08:35

## 2022-03-08 RX ADMIN — METOPROLOL SUCCINATE 100 MG: 25 TABLET, EXTENDED RELEASE ORAL at 08:35

## 2022-03-08 RX ADMIN — ENOXAPARIN SODIUM 30 MG: 100 INJECTION SUBCUTANEOUS at 08:35

## 2022-03-08 RX ADMIN — HYDROCODONE BITARTRATE AND ACETAMINOPHEN 1 TABLET: 7.5; 325 TABLET ORAL at 13:56

## 2022-03-08 RX ADMIN — CEFTRIAXONE 1 G: 1 INJECTION, POWDER, FOR SOLUTION INTRAMUSCULAR; INTRAVENOUS at 17:09

## 2022-03-08 RX ADMIN — DORZOLAMIDE HYDROCHLORIDE AND TIMOLOL MALEATE 1 DROP: 20; 5 SOLUTION/ DROPS OPHTHALMIC at 08:36

## 2022-03-08 RX ADMIN — LATANOPROST 1 DROP: 50 SOLUTION/ DROPS OPHTHALMIC at 21:28

## 2022-03-08 RX ADMIN — DORZOLAMIDE HYDROCHLORIDE AND TIMOLOL MALEATE 1 DROP: 20; 5 SOLUTION/ DROPS OPHTHALMIC at 17:13

## 2022-03-08 RX ADMIN — SODIUM CHLORIDE, PRESERVATIVE FREE 10 ML: 5 INJECTION INTRAVENOUS at 21:29

## 2022-03-08 RX ADMIN — SODIUM CHLORIDE, PRESERVATIVE FREE 10 ML: 5 INJECTION INTRAVENOUS at 13:56

## 2022-03-08 RX ADMIN — PREGABALIN 50 MG: 50 CAPSULE ORAL at 08:36

## 2022-03-08 RX ADMIN — AMLODIPINE BESYLATE 5 MG: 5 TABLET ORAL at 08:36

## 2022-03-08 RX ADMIN — PREGABALIN 50 MG: 50 CAPSULE ORAL at 21:25

## 2022-03-08 RX ADMIN — INSULIN LISPRO 2 UNITS: 100 INJECTION, SOLUTION INTRAVENOUS; SUBCUTANEOUS at 16:44

## 2022-03-08 RX ADMIN — SODIUM CHLORIDE, PRESERVATIVE FREE 10 ML: 5 INJECTION INTRAVENOUS at 05:34

## 2022-03-08 NOTE — PROGRESS NOTES
Physician Progress Note      PATIENT:               Nupur Hagen  CSN #:                  [id number]  :                       1940  ADMIT DATE:       3/6/2022 8:22 PM  [address] [place] DATE:  RESPONDING  PROVIDER #:        VINAY MA DO          QUERY TEXT:    Patient admitted with UTI, noted to have documentation of CKD. If possible, please document in progress notes and discharge summary if you are evaluating and/or treating any of the following: The medical record reflects the following:  Risk Factors: ckd  Clinical Indicators: GFR 35, 50  Treatment: IVF  Options provided:  -- CKD Stage 2 GFR 60-90  -- CKD Stage 3a GFR 45-59  -- CKD Stage 3b GFR 30-44  -- Other - I will add my own diagnosis  -- Disagree - Not applicable / Not valid  -- Disagree - Clinically unable to determine / Unknown  -- Refer to Clinical Documentation Reviewer    PROVIDER RESPONSE TEXT:    This patient has CKD Stage 3a.     Query created by: Karen Louise on 3/8/2022 11:54 AM      Electronically signed by:  Linwood Treviño DO 3/8/2022 2:02 PM

## 2022-03-08 NOTE — PROGRESS NOTES
ACUTE PHYSICAL THERAPY GOALS:  (Developed with and agreed upon by patient and/or caregiver. )  LTG:  (1.)Ms. Sienna Hinds will move from supine to sit and sit to supine , scoot up and down and roll side to side in bed with STAND BY ASSIST within 7 treatment day(s). (2.)Ms. Sienna Hinds will transfer from bed to chair and chair to bed with CONTACT GUARD ASSIST using the least restrictive device within 7 treatment day(s). (3.)Ms. Sienna Hinds will ambulate with CONTACT GUARD ASSIST for 150 feet with the least restrictive device within 7 treatment day(s). (4.)Ms. Sienna Hinds will perform exercises per HEP for 15+ minutes to improve strength and mobility within 7 days. PHYSICAL THERAPY: Daily Note and AM Treatment Day # 2    Daja Garcia is a 78 y.o. female   PRIMARY DIAGNOSIS: Humerus fracture  Fall [W19. XXXA]  Humerus fracture [S42.309A]  UTI (urinary tract infection) [N39.0]         ASSESSMENT:     REHAB RECOMMENDATIONS: CURRENT LEVEL OF FUNCTION:  (Most Recently Demonstrated)   Recommendation to date pending progress:  Setting:   Short-term Rehab  Equipment:    To Be Determined Bed Mobility:   Moderate Assistance  Sit to Stand:   Moderate Assistance  Transfers:   Moderate Assistance  Gait/Mobility:   Moderate Assistance     ASSESSMENT:  Ms. Sienna Hinds is admitted with non operative left humerus fx- NWB in sling. She seems a little weaker than yesterday, needing more assistance with bed mobility and transfers and wasn't able to maintain her gait distance from yesterday. Mod assist for bed mobility, transfers, and a few steps to chair. Reports feeling weak and a little lightheaded although BP/vitals stable during and after activity. Was noted to have right lean in supine and in sitting after activity. Performs LE exercises in chair with good participation. Will need continued therapy during hospital stay and rehab at [facility].      SUBJECTIVE:   Ms. Sienna Hinds states, \"I'm not feeling as good today\"    SOCIAL HISTORY/ LIVING ENVIRONMENT: Lives alone. Mod I with rolling walker at baseline. Niece/other family members come to assist frequently.    Home Environment: Private residence  One/Two Story Residence: One story  Living Alone: Yes  Support Systems: Other Family Member(s)  OBJECTIVE:     PAIN: VITAL SIGNS: LINES/DRAINS:   Pre Treatment: Pain Screen  Pain Scale 1: Numeric (0 - 10)  Pain Intensity 1: 0  Post Treatment: 0/10 after session Vital Signs  O2 Device: None (Room air) Purewick  O2 Device: None (Room air)     MOBILITY: I Mod I S SBA CGA Min Mod Max Total  NT x2 Comments:   Bed Mobility    Rolling [] [] [] [] [] [] [] [] [] [] []    Supine to Sit [] [] [] [] [] [] [x] [] [] [] []    Scooting [] [] [] [] [] [] [x] [] [] [] []    Sit to Supine [] [] [] [] [] [] [] [] [] [] []    Transfers    Sit to Stand [] [] [] [] [] [] [x] [] [] [] []    Bed to Chair [] [] [] [] [] [] [x] [] [] [] []    Stand to Sit [] [] [] [] [] [] [x] [] [] [] []    I=Independent, Mod I=Modified Independent, S=Supervision, SBA=Standby Assistance, CGA=Contact Guard Assistance,   Min=Minimal Assistance, Mod=Moderate Assistance, Max=Maximal Assistance, Total=Total Assistance, NT=Not Tested    BALANCE: Good Fair+ Fair Fair- Poor NT Comments   Sitting Static [] [x] [] [] [] []    Sitting Dynamic [] [x] [] [] [] []              Standing Static [] [] [] [x] [] []    Standing Dynamic [] [] [] [x] [x] []      GAIT: I Mod I S SBA CGA Min Mod Max Total  NT x2 Comments:   Level of Assistance [] [] [] [] [] [] [x] [] [] [] []    Distance 6 ft    DME handheld assist    Gait Quality Unsteady, weak    Weightbearing  Status NWB L UE    I=Independent, Mod I=Modified Independent, S=Supervision, SBA=Standby Assistance, CGA=Contact Guard Assistance,   Min=Minimal Assistance, Mod=Moderate Assistance, Max=Maximal Assistance, Total=Total Assistance, NT=Not Tested    PLAN:   FREQUENCY/DURATION: PT Plan of Care: 3 times/week for duration of hospital stay or until stated goals are met, whichever comes first.  TREATMENT:     TREATMENT:   ($$ Therapeutic Activity: 23-37 mins    )  Therapeutic Activity (30 Minutes): Therapeutic activity included Supine to Sit, Scooting, Transfer Training, Ambulation on level ground, Sitting balance , Standing balance and seated exercises, chair transfer to improve functional Mobility, Strength, Activity tolerance and balance.     TREATMENT GRID:   Date:  3/8/22 Date:   Date:     Activity/Exercise Parameters Parameters Parameters   LAQ 10x     Seated marching 10x     Seated hip abduction 10x     Ankle pumps 10x                             AFTER TREATMENT POSITION/PRECAUTIONS:  Chair, Needs within reach and RN notified    INTERDISCIPLINARY COLLABORATION:  RN/PCT and PT/PTA    TOTAL TREATMENT DURATION:  PT Patient Time In/Time Out  Time In: 1107  Time Out: [address] Howie Wu DPT

## 2022-03-08 NOTE — ROUTINE PROCESS
Bedside and Verbal shift change report given to KIKE Branch (oncoming nurse) by myself (offgoing nurse). Report included the following information SBAR, Kardex, MAR and Recent Results.

## 2022-03-08 NOTE — PROGRESS NOTES
Hospitalist Progress Note   Admit Date:  3/6/2022  8:22 PM   Name:  Gautam Blank   Age:  78 y.o. Sex:  female  :  1940   MRN:  [id number]   Room:  [facility]/    Presenting Complaint: Fall and Head Injury    Reason(s) for Admission: Fall [W19. XXXA]  Humerus fracture [S42.309A]  UTI (urinary tract infection) [N39.0]     Hospital Course & Interval History:     Gautam Blank is a 78 y.o. female with a past medical history significant for hypertension, hypothyroidism and diabetes. The patient lives at home alone but has family members checking in on her. She walks with a walker she had a fall on Thursday. She denies feeling lightheaded dizzy or having any chest pain she states she has shortness of breath at baseline but it was not particularly worse at that time.     She stayed on the ground for about 2-1/2 to 3 hours until family member found her because she injured her left arm and was not able to transfer 90 alert bracelet properly. The family member called the medic alert people and they came and helped the patient from the ground to chair. They did not think that her arm was broken as she had the use of her hand and fingers. However over the last several days she is not being able to walk with her walker and therefore family members were not able to care for her. She was brought to the hospital today with them requesting that she be placed for rehab.     In the ER she was found to have a left proximal humeral fracture, and leukocytosis and a UTI.     Hospitalist service requested for further inpatient management. Subjective/24hr Events (22): Patient examined at bedside. No acute overnight events. Pain in left shoulder under control. Denies fevers/chills, chest pain or shortness of breath. ROS:  10 systems reviewed and negative except as noted above.      Assessment & Plan:     Principal Problem:    Humerus fracture (3/6/2022)  - nonsurgical management, per ortho  - PT/OT  - supportive management    # DOUG on CKD  - improving overall  - hold Lasix and losartan  - avoid nephrotoxic meds, NSAIDs, IV contrast  - strict I's/O's  - daily BMPs    # Acute cystitis   - CTX empirically (day 2)  - follow cultures, growing GNR>100 CFUs      Diabetic polyneuropathy ([facility] [address] (2/1/2017)  - home Lyrica      Type 2 diabetes mellitus ([facility] [address] (4/5/2017)  - hold home meds  - SSI and serial CBGs      Hypoactive thyroid (4/5/2017)   - Synthroid       Class 2 severe obesity due to excess calories with serious comorbidity in adult [facility]) (0/9/5399)  - complicates course of hospitalization      Essential hypertension (5/4/2017)   - hold losartan  - continue amlodipine      Coronary artery disease involving native coronary artery of native heart without angina pectoris (5/31/2017)   - ASA and statin      Adult failure to thrive (3/7/2022)   - due to generalized weakness and above    F/E/N: stop fluids, replete electrolytes as needed, regular diet    Ppx: Lovenox for VTE    Code Status: FULL CODE    Disposition: patient medically clear for hospital discharge, needs STR, CM following. PT/OT consults and PPD ordered. Discussed with patient at bedside. All questions answered. Hospital Problems as of 3/8/2022 Date Reviewed: 12/22/2021          Codes Class Noted - Resolved POA    Adult failure to thrive ICD-10-CM: R62.7  ICD-9-CM: 783.7  3/7/2022 - Present Unknown        Acute kidney injury superimposed on chronic kidney disease (HCC) ICD-10-CM: N17.9, N18.9  ICD-9-CM: 866.00, 585.9  3/7/2022 - Present Unknown        UTI (urinary tract infection) ICD-10-CM: N39.0  ICD-9-CM: 599.0  3/6/2022 - Present Unknown        * (Principal) Humerus fracture ICD-10-CM: S42.309A  ICD-9-CM: 812.20  3/6/2022 - Present Unknown        Fall ICD-10-CM: W19. Callie Servant  ICD-9-CM: E888.9  3/6/2022 - Present Unknown        Coronary artery disease involving native coronary artery of native heart without angina pectoris ICD-10-CM: I25.10  ICD-9-CM: 414.01  5/31/2017 - Present Yes        Essential hypertension ICD-10-CM: I10  ICD-9-CM: 401.9  5/4/2017 - Present Yes        Type 2 diabetes mellitus ([facility] ICD-10-CM: E11.9  ICD-9-CM: 250.00  4/5/2017 - Present Yes    Overview Signed 4/5/2017  2:43 PM by Elvin TEAGUE     Last Assessment & Plan:   Check hemoglobin A1c. SSI with FSBG checks qAC and qhs. Adjust insulin as needed based on FSBG results. Diabetic diet and education as needed. Hypoactive thyroid ICD-10-CM: E03.9  ICD-9-CM: 244.9  4/5/2017 - Present Yes    Overview Signed 4/5/2017  2:43 PM by Elvin TEAGUE     Last Assessment & Plan:   Continue home thyroid replacement meds. Check TSH in the AM and consider med adjustments based on results. Class 2 severe obesity due to excess calories with serious comorbidity in adult [facility]) ICD-10-CM: E66.01  ICD-9-CM: 278.01  4/5/2017 - Present Yes        Diabetic polyneuropathy ([facility] ICD-10-CM: E11.42  ICD-9-CM: 250.60, 357.2  2/1/2017 - Present Yes    Overview Signed 4/5/2017  2:43 PM by Elivn TEAGUE     Last Assessment & Plan:   The patient's clinical history and presentation is more consistent with a diabetic neuropathy that is new onset in this patient with long-standing diabetes mellitus type 2 and diabetic nephropathy. I will start her on Lyrica therapy starting today. Physical therapy to evaluate for mobility on this drug. We will continue to follow closely. No clinical signs or symptoms of gout or cellulitis. Chronic kidney disease ICD-10-CM: N18.9  ICD-9-CM: 585.9  2/18/2016 - Present Yes    Overview Signed 4/5/2017  2:43 PM by Elvin TEAGUE     Last Assessment & Plan: This is likely from diabetic nephropathy and long-standing diabetes mellitus type 2  Continue current care and give IV fluids overnight. Repeat BMP in the morning.                    Objective:     Patient Vitals for the past 24 hrs:   Temp Pulse Resp BP SpO2   03/08/22 1543 98.8 °F (37.1 °C) 70 20 (!) 121/49 97 %   03/08/22 1116 98.4 °F (36.9 °C) 70 18 (!) 133/55 98 %   03/08/22 0745 99.1 °F (37.3 °C) 73 18 (!) 120/51 98 %   03/08/22 0454 98.9 °F (37.2 °C) 70 16 (!) 120/53 95 %   03/08/22 0010 100.4 °F (38 °C) 66 17 (!) 124/58 95 %   03/07/22 2142 (!) 101.3 °F (38.5 °C) -- -- -- --   03/07/22 2100 (!) 101.4 °F (38.6 °C) 65 18 139/61 96 %   03/07/22 1707 98.5 °F (36.9 °C) 72 16 126/75 98 %     Oxygen Therapy  O2 Sat (%): 97 % (03/08/22 1543)  Pulse via Oximetry: 67 beats per minute (03/06/22 2301)  O2 Device: None (Room air) (03/08/22 1228)    Estimated body mass index is 37.18 kg/m² as calculated from the following:    Height as of this encounter: 5' 1.5\" (1.562 m). Weight as of this encounter: 90.7 kg (200 lb). Intake/Output Summary (Last 24 hours) at 3/8/2022 1631  Last data filed at 3/8/2022 1319  Gross per 24 hour   Intake 480 ml   Output 1475 ml   Net -995 ml         Physical Exam:     Blood pressure (!) 121/49, pulse 70, temperature 98.8 °F (37.1 °C), resp. rate 20, height 5' 1.5\" (1.562 m), weight 90.7 kg (200 lb), SpO2 97 %. General:    Well nourished. No overt distress  Head:  Normocephalic, atraumatic  Eyes:  Sclerae appear normal.  Pupils equally round. ENT:  Nares appear normal, no drainage. Moist oral mucosa  Neck:  No restricted ROM. Trachea midline   CV:   RRR. No jugular venous distension. Lungs:   CTAB. No wheezing, rhonchi, or rales. Respirations even, unlabored  Abdomen: Bowel sounds present. Soft, nontender, nondistended. Extremities: No cyanosis or clubbing. No edema  Skin:     No rashes and normal coloration. Warm and dry. Neuro:  CN II-XII grossly intact. Sensation intact. A&Ox3  Psych:  Normal mood and affect.       I have reviewed ordered lab tests and independently visualized imaging below:    Recent Labs:  Recent Results (from the past 48 hour(s))   TYPE & SCREEN    Collection Time: 03/06/22  9:42 PM   Result Value Ref Range Crossmatch Expiration 03/09/2022,2355     ABO/Rh(D) B POSITIVE     Antibody screen NEG    PROCALCITONIN    Collection Time: 03/06/22  9:42 PM   Result Value Ref Range    Procalcitonin 0.55 (H) 0.00 - 0.49 ng/mL   CBC WITH AUTOMATED DIFF    Collection Time: 03/06/22  9:45 PM   Result Value Ref Range    WBC 19.1 (H) 4.3 - 11.1 K/uL    RBC 3.56 (L) 4.05 - 5.2 M/uL    HGB 10.6 (L) 11.7 - 15.4 g/dL    HCT 32.9 (L) 35.8 - 46.3 %    MCV 92.4 79.6 - 97.8 FL    MCH 29.8 26.1 - 32.9 PG    MCHC 32.2 31.4 - 35.0 g/dL    RDW 14.1 11.9 - 14.6 %    PLATELET 548 053 - 630 K/uL    MPV 10.4 9.4 - 12.3 FL    ABSOLUTE NRBC 0.00 0.0 - 0.2 K/uL    DF AUTOMATED      NEUTROPHILS 71 43 - 78 %    LYMPHOCYTES 14 13 - 44 %    MONOCYTES 14 (H) 4.0 - 12.0 %    EOSINOPHILS 0 (L) 0.5 - 7.8 %    BASOPHILS 0 0.0 - 2.0 %    IMMATURE GRANULOCYTES 1 0.0 - 5.0 %    ABS. NEUTROPHILS 13.7 (H) 1.7 - 8.2 K/UL    ABS. LYMPHOCYTES 2.6 0.5 - 4.6 K/UL    ABS. MONOCYTES 2.6 (H) 0.1 - 1.3 K/UL    ABS. EOSINOPHILS 0.0 0.0 - 0.8 K/UL    ABS. BASOPHILS 0.0 0.0 - 0.2 K/UL    ABS. IMM. GRANS. 0.2 0.0 - 0.5 K/UL   PROTHROMBIN TIME + INR    Collection Time: 03/06/22  9:45 PM   Result Value Ref Range    Prothrombin time 13.7 12.6 - 14.5 sec    INR 1.0     METABOLIC PANEL, COMPREHENSIVE    Collection Time: 03/06/22  9:45 PM   Result Value Ref Range    Sodium 134 (L) 136 - 145 mmol/L    Potassium 4.4 3.5 - 5.1 mmol/L    Chloride 102 98 - 107 mmol/L    CO2 23 21 - 32 mmol/L    Anion gap 9 7 - 16 mmol/L    Glucose 137 (H) 65 - 100 mg/dL    BUN 32 (H) 8 - 23 MG/DL    Creatinine 1.50 (H) 0.6 - 1.0 MG/DL    GFR est AA 43 (L) >60 ml/min/1.73m2    GFR est non-AA 35 (L) >60 ml/min/1.73m2    Calcium 8.6 8.3 - 10.4 MG/DL    Bilirubin, total 0.7 0.2 - 1.1 MG/DL    ALT (SGPT) 20 12 - 65 U/L    AST (SGOT) 34 15 - 37 U/L    Alk.  phosphatase 116 50 - 136 U/L    Protein, total 7.1 6.3 - 8.2 g/dL    Albumin 2.8 (L) 3.2 - 4.6 g/dL    Globulin 4.3 (H) 2.3 - 3.5 g/dL    A-G Ratio 0.7 (L) 1.2 - 3.5     MAGNESIUM    Collection Time: 03/06/22  9:45 PM   Result Value Ref Range    Magnesium 2.4 1.8 - 2.4 mg/dL   URINALYSIS W/ RFLX MICROSCOPIC    Collection Time: 03/06/22 10:35 PM   Result Value Ref Range    Color YELLOW      Appearance TURBID      Specific gravity 1.013 1.001 - 1.023      pH (UA) 5.5 5.0 - 9.0      Protein 30 (A) NEG mg/dL    Glucose Negative mg/dL    Ketone Negative NEG mg/dL    Bilirubin Negative NEG      Blood MODERATE (A) NEG      Urobilinogen 0.2 0.2 - 1.0 EU/dL    Nitrites Positive (A) NEG      Leukocyte Esterase LARGE (A) NEG      WBC >100 0 /hpf    Bacteria 1+ (H) 0 /hpf    Other observations RESULTS VERIFIED MANUALLY     CULTURE, URINE    Collection Time: 03/06/22 10:35 PM    Specimen: Clean catch; Urine   Result Value Ref Range    Special Requests: NO SPECIAL REQUESTS      Culture result: (A)       >100,000 COLONIES/mL GRAM NEGATIVE RODS IDENTIFICATION AND SUSCEPTIBILITY TO FOLLOW   GLUCOSE, POC    Collection Time: 03/07/22  8:07 AM   Result Value Ref Range    Glucose (POC) 120 (H) 65 - 100 mg/dL    Performed by [place]    CULTURE, BLOOD    Collection Time: 03/07/22  9:19 AM    Specimen: Blood   Result Value Ref Range    Special Requests: RIGHT  HAND        Culture result: NO GROWTH AFTER 22 HOURS     LACTIC ACID    Collection Time: 03/07/22  9:19 AM   Result Value Ref Range    Lactic acid 1.5 0.4 - 2.0 MMOL/L   NT-PRO BNP    Collection Time: 03/07/22  9:19 AM   Result Value Ref Range    NT pro-BNP 3,259 (H) <450 PG/ML   CULTURE, BLOOD    Collection Time: 03/07/22  9:29 AM    Specimen: Blood   Result Value Ref Range    Special Requests: RIGHT  FOREARM        Culture result: NO GROWTH AFTER 22 HOURS     GLUCOSE, POC    Collection Time: 03/07/22 11:40 AM   Result Value Ref Range    Glucose (POC) 114 (H) 65 - 100 mg/dL    Performed by Gaffney[place]    GLUCOSE, POC    Collection Time: 03/07/22  5:07 PM   Result Value Ref Range    Glucose (POC) 111 (H) 65 - 100 mg/dL Performed by Melani Aguilar    GLUCOSE, POC    Collection Time: 03/07/22  9:01 PM   Result Value Ref Range    Glucose (POC) 129 (H) 65 - 100 mg/dL    Performed by Britton    GLUCOSE, POC    Collection Time: 03/08/22  5:16 AM   Result Value Ref Range    Glucose (POC) 102 (H) 65 - 100 mg/dL    Performed by [id number] PPD TEST IN 24 HRS    Collection Time: 03/08/22  5:29 AM   Result Value Ref Range    PPD Negative Negative    mm Induration 0 0 - 5 mm   METABOLIC PANEL, COMPREHENSIVE    Collection Time: 03/08/22  7:28 AM   Result Value Ref Range    Sodium 136 136 - 145 mmol/L    Potassium 4.0 3.5 - 5.1 mmol/L    Chloride 107 98 - 107 mmol/L    CO2 19 (L) 21 - 32 mmol/L    Anion gap 10 7 - 16 mmol/L    Glucose 93 65 - 100 mg/dL    BUN 26 (H) 8 - 23 MG/DL    Creatinine 1.10 (H) 0.6 - 1.0 MG/DL    GFR est AA >60 >60 ml/min/1.73m2    GFR est non-AA 50 (L) >60 ml/min/1.73m2    Calcium 8.4 8.3 - 10.4 MG/DL    Bilirubin, total 0.9 0.2 - 1.1 MG/DL    ALT (SGPT) 11 (L) 12 - 65 U/L    AST (SGOT) 22 15 - 37 U/L    Alk. phosphatase 114 50 - 136 U/L    Protein, total 5.8 (L) 6.3 - 8.2 g/dL    Albumin 2.1 (L) 3.2 - 4.6 g/dL    Globulin 3.7 (H) 2.3 - 3.5 g/dL    A-G Ratio 0.6 (L) 1.2 - 3.5     CBC WITH AUTOMATED DIFF    Collection Time: 03/08/22  7:28 AM   Result Value Ref Range    WBC 12.8 (H) 4.3 - 11.1 K/uL    RBC 4.01 (L) 4.05 - 5.2 M/uL    HGB 12.0 11.7 - 15.4 g/dL    HCT 36.5 35.8 - 46.3 %    MCV 91.0 79.6 - 97.8 FL    MCH 29.9 26.1 - 32.9 PG    MCHC 32.9 31.4 - 35.0 g/dL    RDW 14.0 11.9 - 14.6 %    PLATELET 083 (L) 197 - 450 K/uL    MPV 10.0 9.4 - 12.3 FL    ABSOLUTE NRBC 0.00 0.0 - 0.2 K/uL    DF AUTOMATED      NEUTROPHILS 70 43 - 78 %    LYMPHOCYTES 19 13 - 44 %    MONOCYTES 10 4.0 - 12.0 %    EOSINOPHILS 1 0.5 - 7.8 %    BASOPHILS 0 0.0 - 2.0 %    IMMATURE GRANULOCYTES 1 0.0 - 5.0 %    ABS. NEUTROPHILS 8.9 (H) 1.7 - 8.2 K/UL    ABS. LYMPHOCYTES 2.4 0.5 - 4.6 K/UL    ABS. MONOCYTES 1.2 0.1 - 1.3 K/UL    ABS. EOSINOPHILS 0.1 0.0 - 0.8 K/UL    ABS. BASOPHILS 0.0 0.0 - 0.2 K/UL    ABS. IMM. GRANS. 0.1 0.0 - 0.5 K/UL   GLUCOSE, POC    Collection Time: 03/08/22  8:36 AM   Result Value Ref Range    Glucose (POC) 113 (H) 65 - 100 mg/dL    Performed by Trish    GLUCOSE, POC    Collection Time: 03/08/22 11:16 AM   Result Value Ref Range    Glucose (POC) 134 (H) 65 - 100 mg/dL    Performed by Trish        All Micro Results     Procedure Component Value Units Date/Time    CULTURE, URINE [[id number]]  (Abnormal) Collected: 03/06/22 2235    Order Status: Completed Specimen: Urine from Clean catch Updated: 03/08/22 0822     Special Requests: NO SPECIAL REQUESTS        Culture result:       >100,000 COLONIES/mL GRAM NEGATIVE RODS IDENTIFICATION AND SUSCEPTIBILITY TO FOLLOW          CULTURE, BLOOD [[id number]] Collected: 03/07/22 0929    Order Status: Completed Specimen: Blood Updated: 03/08/22 0811     Special Requests: --        RIGHT  FOREARM       Culture result: NO GROWTH AFTER 22 HOURS       CULTURE, BLOOD [[id number]] Collected: 03/07/22 0919    Order Status: Completed Specimen: Blood Updated: 03/08/22 0811     Special Requests: --        RIGHT  HAND       Culture result: NO GROWTH AFTER 22 HOURS             Other Studies:  No results found.     Current Meds:  Current Facility-Administered Medications   Medication Dose Route Frequency    [START ON 3/9/2022] enoxaparin (LOVENOX) injection 40 mg  40 mg SubCUTAneous DAILY    cefTRIAXone (ROCEPHIN) 1 g in 0.9% sodium chloride (MBP/ADV) 50 mL MBP  1 g IntraVENous Q24H    sodium chloride (NS) flush 5-40 mL  5-40 mL IntraVENous Q8H    sodium chloride (NS) flush 5-40 mL  5-40 mL IntraVENous PRN    acetaminophen (TYLENOL) tablet 650 mg  650 mg Oral Q6H PRN    Or    acetaminophen (TYLENOL) suppository 650 mg  650 mg Rectal Q6H PRN    polyethylene glycol (MIRALAX) packet 17 g  17 g Oral DAILY PRN    ondansetron (ZOFRAN ODT) tablet 4 mg  4 mg Oral Q8H PRN    Or    ondansetron (ZOFRAN) injection 4 mg  4 mg IntraVENous Q6H PRN    insulin lispro (HUMALOG) injection   SubCUTAneous AC&HS    amLODIPine (NORVASC) tablet 5 mg  5 mg Oral DAILY    aspirin delayed-release tablet 81 mg  81 mg Oral DAILY    cetirizine (ZYRTEC) tablet 10 mg  10 mg Oral DAILY PRN    dorzolamide-timoloL (COSOPT) 22.3-6.8 mg/mL ophthalmic solution 1 Drop  1 Drop Both Eyes BID    [Held by provider] furosemide (LASIX) tablet 20 mg  20 mg Oral DAILY    latanoprost (XALATAN) 0.005 % ophthalmic solution 1 Drop  1 Drop Both Eyes QHS    levothyroxine (SYNTHROID) tablet 50 mcg  50 mcg Oral ACB    [Held by provider] losartan (COZAAR) tablet 100 mg  100 mg Oral DAILY    metoprolol succinate (TOPROL-XL) XL tablet 100 mg  100 mg Oral DAILY    pantoprazole (PROTONIX) tablet 40 mg  40 mg Oral ACB    pregabalin (LYRICA) capsule 50 mg  50 mg Oral BID    rosuvastatin (CRESTOR) tablet 20 mg  20 mg Oral QHS    0.9% sodium chloride infusion  100 mL/hr IntraVENous CONTINUOUS    HYDROcodone-acetaminophen (NORCO) 7.5-325 mg per tablet 1 Tablet  1 Tablet Oral Q6H PRN       Signed: Becka Ortiz DO    Part of this note may have been written by using a voice dictation software. The note has been proof read but may still contain some grammatical/other typographical errors.

## 2022-03-08 NOTE — ROUTINE PROCESS
Bedside and Verbal shift change report given to myself (oncoming nurse) by Penelope Riggs RN (offgoing nurse). Report included the following information SBAR, Kardex, MAR and Recent Results.

## 2022-03-09 LAB
ANION GAP SERPL CALC-SCNC: 8 MMOL/L (ref 7–16)
BACTERIA SPEC CULT: ABNORMAL
BASOPHILS # BLD: 0 K/UL (ref 0–0.2)
BASOPHILS NFR BLD: 0 % (ref 0–2)
BUN SERPL-MCNC: 21 MG/DL (ref 8–23)
CALCIUM SERPL-MCNC: 8.6 MG/DL (ref 8.3–10.4)
CHLORIDE SERPL-SCNC: 109 MMOL/L (ref 98–107)
CO2 SERPL-SCNC: 19 MMOL/L (ref 21–32)
CREAT SERPL-MCNC: 1.1 MG/DL (ref 0.6–1)
DIFFERENTIAL METHOD BLD: ABNORMAL
EOSINOPHIL # BLD: 0.3 K/UL (ref 0–0.8)
EOSINOPHIL NFR BLD: 2 % (ref 0.5–7.8)
ERYTHROCYTE [DISTWIDTH] IN BLOOD BY AUTOMATED COUNT: 14.3 % (ref 11.9–14.6)
FERRITIN SERPL-MCNC: 849 NG/ML (ref 8–388)
FOLATE SERPL-MCNC: 6.6 NG/ML (ref 3.1–17.5)
GLUCOSE BLD STRIP.AUTO-MCNC: 115 MG/DL (ref 65–100)
GLUCOSE BLD STRIP.AUTO-MCNC: 123 MG/DL (ref 65–100)
GLUCOSE BLD STRIP.AUTO-MCNC: 133 MG/DL (ref 65–100)
GLUCOSE BLD STRIP.AUTO-MCNC: 151 MG/DL (ref 65–100)
GLUCOSE SERPL-MCNC: 106 MG/DL (ref 65–100)
HCT VFR BLD AUTO: 24.6 % (ref 35.8–46.3)
HCT VFR BLD AUTO: 25.2 % (ref 35.8–46.3)
HGB BLD-MCNC: 7.8 G/DL (ref 11.7–15.4)
HGB BLD-MCNC: 7.9 G/DL (ref 11.7–15.4)
HGB RETIC QN AUTO: 28 PG (ref 29–35)
IMM GRANULOCYTES # BLD AUTO: 0.1 K/UL (ref 0–0.5)
IMM GRANULOCYTES NFR BLD AUTO: 1 % (ref 0–5)
IMM RETICS NFR: 23.2 % (ref 3–15.9)
IRON SATN MFR SERPL: 9 %
IRON SERPL-MCNC: 13 UG/DL (ref 35–150)
LDH SERPL L TO P-CCNC: 196 U/L (ref 110–210)
LYMPHOCYTES # BLD: 3 K/UL (ref 0.5–4.6)
LYMPHOCYTES NFR BLD: 22 % (ref 13–44)
MCH RBC QN AUTO: 29 PG (ref 26.1–32.9)
MCHC RBC AUTO-ENTMCNC: 31.3 G/DL (ref 31.4–35)
MCV RBC AUTO: 92.6 FL (ref 79.6–97.8)
MM INDURATION POC: 0 MM (ref 0–5)
MONOCYTES # BLD: 1.4 K/UL (ref 0.1–1.3)
MONOCYTES NFR BLD: 10 % (ref 4–12)
NEUTS SEG # BLD: 9 K/UL (ref 1.7–8.2)
NEUTS SEG NFR BLD: 65 % (ref 43–78)
NRBC # BLD: 0 K/UL (ref 0–0.2)
PLATELET # BLD AUTO: 234 K/UL (ref 150–450)
PMV BLD AUTO: 10 FL (ref 9.4–12.3)
POTASSIUM SERPL-SCNC: 3.8 MMOL/L (ref 3.5–5.1)
PPD POC: NEGATIVE NEGATIVE
RBC # BLD AUTO: 2.72 M/UL (ref 4.05–5.2)
RETICS # AUTO: 0.06 M/UL (ref 0.03–0.1)
RETICS/RBC NFR AUTO: 2.2 % (ref 0.3–2)
SERVICE CMNT-IMP: ABNORMAL
SODIUM SERPL-SCNC: 136 MMOL/L (ref 136–145)
TIBC SERPL-MCNC: 151 UG/DL (ref 250–450)
VIT B12 SERPL-MCNC: 2317 PG/ML (ref 193–986)
WBC # BLD AUTO: 13.7 K/UL (ref 4.3–11.1)

## 2022-03-09 PROCEDURE — 82746 ASSAY OF FOLIC ACID SERUM: CPT

## 2022-03-09 PROCEDURE — 83010 ASSAY OF HAPTOGLOBIN QUANT: CPT

## 2022-03-09 PROCEDURE — 85046 RETICYTE/HGB CONCENTRATE: CPT

## 2022-03-09 PROCEDURE — 74011000250 HC RX REV CODE- 250: Performed by: INTERNAL MEDICINE

## 2022-03-09 PROCEDURE — 65270000029 HC RM PRIVATE

## 2022-03-09 PROCEDURE — 83615 LACTATE (LD) (LDH) ENZYME: CPT

## 2022-03-09 PROCEDURE — 80048 BASIC METABOLIC PNL TOTAL CA: CPT

## 2022-03-09 PROCEDURE — 85025 COMPLETE CBC W/AUTO DIFF WBC: CPT

## 2022-03-09 PROCEDURE — 97530 THERAPEUTIC ACTIVITIES: CPT

## 2022-03-09 PROCEDURE — 82962 GLUCOSE BLOOD TEST: CPT

## 2022-03-09 PROCEDURE — 74011250636 HC RX REV CODE- 250/636: Performed by: INTERNAL MEDICINE

## 2022-03-09 PROCEDURE — 36415 COLL VENOUS BLD VENIPUNCTURE: CPT

## 2022-03-09 PROCEDURE — 74011250637 HC RX REV CODE- 250/637: Performed by: INTERNAL MEDICINE

## 2022-03-09 PROCEDURE — 74011000258 HC RX REV CODE- 258: Performed by: INTERNAL MEDICINE

## 2022-03-09 PROCEDURE — 82607 VITAMIN B-12: CPT

## 2022-03-09 PROCEDURE — 85018 HEMOGLOBIN: CPT

## 2022-03-09 PROCEDURE — 83540 ASSAY OF IRON: CPT

## 2022-03-09 PROCEDURE — 97535 SELF CARE MNGMENT TRAINING: CPT

## 2022-03-09 PROCEDURE — 82728 ASSAY OF FERRITIN: CPT

## 2022-03-09 RX ADMIN — SODIUM CHLORIDE 100 ML/HR: 900 INJECTION, SOLUTION INTRAVENOUS at 02:19

## 2022-03-09 RX ADMIN — DORZOLAMIDE HYDROCHLORIDE AND TIMOLOL MALEATE 1 DROP: 20; 5 SOLUTION/ DROPS OPHTHALMIC at 08:09

## 2022-03-09 RX ADMIN — LATANOPROST 1 DROP: 50 SOLUTION/ DROPS OPHTHALMIC at 21:58

## 2022-03-09 RX ADMIN — CEFTRIAXONE 1 G: 1 INJECTION, POWDER, FOR SOLUTION INTRAMUSCULAR; INTRAVENOUS at 17:12

## 2022-03-09 RX ADMIN — AMLODIPINE BESYLATE 5 MG: 5 TABLET ORAL at 08:08

## 2022-03-09 RX ADMIN — SODIUM CHLORIDE, PRESERVATIVE FREE 10 ML: 5 INJECTION INTRAVENOUS at 21:58

## 2022-03-09 RX ADMIN — ACETAMINOPHEN 650 MG: 325 TABLET ORAL at 23:58

## 2022-03-09 RX ADMIN — LOSARTAN POTASSIUM 100 MG: 50 TABLET, FILM COATED ORAL at 08:52

## 2022-03-09 RX ADMIN — PREGABALIN 50 MG: 50 CAPSULE ORAL at 21:57

## 2022-03-09 RX ADMIN — DORZOLAMIDE HYDROCHLORIDE AND TIMOLOL MALEATE 1 DROP: 20; 5 SOLUTION/ DROPS OPHTHALMIC at 17:12

## 2022-03-09 RX ADMIN — FUROSEMIDE 20 MG: 40 TABLET ORAL at 08:52

## 2022-03-09 RX ADMIN — PANTOPRAZOLE SODIUM 40 MG: 40 TABLET, DELAYED RELEASE ORAL at 05:56

## 2022-03-09 RX ADMIN — PREGABALIN 50 MG: 50 CAPSULE ORAL at 08:08

## 2022-03-09 RX ADMIN — ROSUVASTATIN CALCIUM 20 MG: 20 TABLET, FILM COATED ORAL at 21:57

## 2022-03-09 RX ADMIN — ASPIRIN 81 MG: 81 TABLET ORAL at 08:08

## 2022-03-09 RX ADMIN — LEVOTHYROXINE SODIUM 50 MCG: 0.05 TABLET ORAL at 05:55

## 2022-03-09 RX ADMIN — METOPROLOL SUCCINATE 100 MG: 25 TABLET, EXTENDED RELEASE ORAL at 08:08

## 2022-03-09 RX ADMIN — SODIUM CHLORIDE, PRESERVATIVE FREE 10 ML: 5 INJECTION INTRAVENOUS at 05:56

## 2022-03-09 NOTE — PROGRESS NOTES
Hospitalist Progress Note   Admit Date:  3/6/2022  8:22 PM   Name:  Kendall Castro   Age:  78 y.o. Sex:  female  :  1940   MRN:  [id number]   [facility]:  [facility]/    Presenting Complaint: Fall and Head Injury    Reason(s) for Admission: Fall [W19. XXXA]  Humerus fracture [S42.309A]  UTI (urinary tract infection) [N39.0]     Hospital Course & Interval History:     Kendall Castro is a 78 y.o. female with a past medical history significant for hypertension, hypothyroidism and diabetes. The patient lives at home alone but has family members checking in on her. She walks with a walker she had a fall on Thursday. She denies feeling lightheaded dizzy or having any chest pain she states she has shortness of breath at baseline but it was not particularly worse at that time.     She stayed on the ground for about 2-1/2 to 3 hours until family member found her because she injured her left arm and was not able to transfer 90 alert bracelet properly. The family member called the medic alert people and they came and helped the patient from the ground to chair. They did not think that her arm was broken as she had the use of her hand and fingers. However over the last several days she is not being able to walk with her walker and therefore family members were not able to care for her. She was brought to the hospital today with them requesting that she be placed for rehab.     In the ER she was found to have a left proximal humeral fracture, and leukocytosis and a UTI.     Hospitalist service requested for further inpatient management. Subjective/24hr Events (22): Patient examined at bedside. No acute overnight events. Pain in left shoulder under control. Has some \"knee pain\". Noted drop in Hgb to 7.9 confirmed on repeat of 7.8. Has not noticed any signs of bleeding including black/red stools. Denies fevers/chills, chest pain, shortness of breath.      ROS:  10 systems reviewed and negative except as noted above. Assessment & Plan:     Principal Problem:    Humerus fracture (3/6/2022)  - nonsurgical management, per ortho  - PT/OT  - supportive management    # DOUG on CKD  - improving overall  - restarted Lasix and losartan  - avoid nephrotoxic meds, NSAIDs, IV contrast  - strict I's/O's  - daily BMPs    # Acute normocytic anemia  - drop to upper 7's  - ?no overt signs of bleeding  - anemia studies (iron studies, B12/folate, haptoglobin/LDH, reticulocyte count, occult blood)  - hold Lovenox  - avoid NSAIDs  - transfuse for Hgb<7 and/or brisk bleeding  - monitor     # Acute cystitis   - CTX empirically (day 3)  - follow cultures, growing GNR>100 CFUs      Diabetic polyneuropathy ([facility] (2/1/2017)  - home Lyrica      Type 2 diabetes mellitus ([facility] (4/5/2017)  - hold home meds  - SSI and serial CBGs      Hypoactive thyroid (4/5/2017)   - Synthroid       Class 2 severe obesity due to excess calories with serious comorbidity in adult [facility]) (4/7/5478)  - complicates course of hospitalization      Essential hypertension (5/4/2017)   - hold losartan  - continue amlodipine      Coronary artery disease involving native coronary artery of native heart without angina pectoris (5/31/2017)   - ASA and statin      Adult failure to thrive (3/7/2022)   - due to generalized weakness and above    F/E/N: stop fluids, replete electrolytes as needed, regular diet    Ppx: Lovenox for VTE    Code Status: FULL CODE    Disposition: pending clinical improvement with plan as above, needs STR at discharge hopefully in next 1-2 days. PT/OT consults and PPD ordered. Discussed with patient at bedside. All questions answered.      Hospital Problems as of 3/9/2022 Date Reviewed: 12/22/2021          Codes Class Noted - Resolved POA    Adult failure to thrive ICD-10-CM: R62.7  ICD-9-CM: 783.7  3/7/2022 - Present Unknown        Acute kidney injury superimposed on chronic kidney disease ([facility] [address] ICD-10-CM: N17.9, N18.9  ICD-9-CM: 866.00, 585.9  3/7/2022 - Present Unknown        UTI (urinary tract infection) ICD-10-CM: N39.0  ICD-9-CM: 599.0  3/6/2022 - Present Unknown        * (Principal) Humerus fracture ICD-10-CM: S42.309A  ICD-9-CM: 812.20  3/6/2022 - Present Unknown        Fall ICD-10-CM: W19. Chika Koroma  ICD-9-CM: E888.9  3/6/2022 - Present Unknown        Coronary artery disease involving native coronary artery of native heart without angina pectoris ICD-10-CM: I25.10  ICD-9-CM: 414.01  5/31/2017 - Present Yes        Essential hypertension ICD-10-CM: I10  ICD-9-CM: 401.9  5/4/2017 - Present Yes        Type 2 diabetes mellitus ([facility] ICD-10-CM: E11.9  ICD-9-CM: 250.00  4/5/2017 - Present Yes    Overview Signed 4/5/2017  2:43 PM by Stanley TEAGUE     Last Assessment & Plan:   Check hemoglobin A1c. SSI with FSBG checks qAC and qhs. Adjust insulin as needed based on FSBG results. Diabetic diet and education as needed. Hypoactive thyroid ICD-10-CM: E03.9  ICD-9-CM: 244.9  4/5/2017 - Present Yes    Overview Signed 4/5/2017  2:43 PM by Stanley TEAGUE     Last Assessment & Plan:   Continue home thyroid replacement meds. Check TSH in the AM and consider med adjustments based on results. Class 2 severe obesity due to excess calories with serious comorbidity in adult [facility]) ICD-10-CM: E66.01  ICD-9-CM: 278.01  4/5/2017 - Present Yes        Diabetic polyneuropathy ([facility] ICD-10-CM: E11.42  ICD-9-CM: 250.60, 357.2  2/1/2017 - Present Yes    Overview Signed 4/5/2017  2:43 PM by Stanley TEAGUE     Last Assessment & Plan:   The patient's clinical history and presentation is more consistent with a diabetic neuropathy that is new onset in this patient with long-standing diabetes mellitus type 2 and diabetic nephropathy. I will start her on Lyrica therapy starting today. Physical therapy to evaluate for mobility on this drug. We will continue to follow closely. No clinical signs or symptoms of gout or cellulitis.              Chronic kidney disease ICD-10-CM: N18.9  ICD-9-CM: 585.9  2/18/2016 - Present Yes    Overview Signed 4/5/2017  2:43 PM by April Mcghee     Last Assessment & Plan: This is likely from diabetic nephropathy and long-standing diabetes mellitus type 2  Continue current care and give IV fluids overnight. Repeat BMP in the morning. Objective:     Patient Vitals for the past 24 hrs:   Temp Pulse Resp BP SpO2   03/09/22 1130 -- -- -- -- 95 %   03/09/22 0806 99.1 °F (37.3 °C) 74 18 (!) 147/66 94 %   03/09/22 0417 98.3 °F (36.8 °C) 77 16 120/79 97 %   03/08/22 2357 98.4 °F (36.9 °C) 71 18 (!) 144/71 96 %   03/08/22 2013 99.5 °F (37.5 °C) 67 18 131/66 95 %     Oxygen Therapy  O2 Sat (%): 95 % (03/09/22 1130)  Pulse via Oximetry: 67 beats per minute (03/06/22 2301)  O2 Device: None (Room air) (03/09/22 1139)    Estimated body mass index is 37.18 kg/m² as calculated from the following:    Height as of this encounter: 5' 1.5\" (1.562 m). Weight as of this encounter: 90.7 kg (200 lb). Intake/Output Summary (Last 24 hours) at 3/9/2022 1723  Last data filed at 3/9/2022 1313  Gross per 24 hour   Intake 434 ml   Output 2000 ml   Net -1566 ml         Physical Exam:     Blood pressure (!) 147/66, pulse 74, temperature 99.1 °F (37.3 °C), resp. rate 18, height 5' 1.5\" (1.562 m), weight 90.7 kg (200 lb), SpO2 95 %. General:    Well nourished. No overt distress  Head:  Normocephalic, atraumatic  Eyes:  Sclerae appear normal.  Pupils equally round. ENT:  Nares appear normal, no drainage. Moist oral mucosa  Neck:  No restricted ROM. Trachea midline   CV:   RRR. No jugular venous distension. Lungs:   CTAB. No wheezing, rhonchi, or rales. Respirations even, unlabored  Abdomen: Bowel sounds present. Soft, nontender, nondistended. Extremities: No cyanosis or clubbing. No edema  Skin:     No rashes and normal coloration. Warm and dry. Neuro:  CN II-XII grossly intact. Sensation intact. A&Ox3  Psych:  Normal mood and affect. I have reviewed ordered lab tests and independently visualized imaging below:    Recent Labs:  Recent Results (from the past 48 hour(s))   GLUCOSE, POC    Collection Time: 03/07/22  9:01 PM   Result Value Ref Range    Glucose (POC) 129 (H) 65 - 100 mg/dL    Performed by Britton    GLUCOSE, POC    Collection Time: 03/08/22  5:16 AM   Result Value Ref Range    Glucose (POC) 102 (H) 65 - 100 mg/dL    Performed by [id number] PPD TEST IN 24 HRS    Collection Time: 03/08/22  5:29 AM   Result Value Ref Range    PPD Negative Negative    mm Induration 0 0 - 5 mm   METABOLIC PANEL, COMPREHENSIVE    Collection Time: 03/08/22  7:28 AM   Result Value Ref Range    Sodium 136 136 - 145 mmol/L    Potassium 4.0 3.5 - 5.1 mmol/L    Chloride 107 98 - 107 mmol/L    CO2 19 (L) 21 - 32 mmol/L    Anion gap 10 7 - 16 mmol/L    Glucose 93 65 - 100 mg/dL    BUN 26 (H) 8 - 23 MG/DL    Creatinine 1.10 (H) 0.6 - 1.0 MG/DL    GFR est AA >60 >60 ml/min/1.73m2    GFR est non-AA 50 (L) >60 ml/min/1.73m2    Calcium 8.4 8.3 - 10.4 MG/DL    Bilirubin, total 0.9 0.2 - 1.1 MG/DL    ALT (SGPT) 11 (L) 12 - 65 U/L    AST (SGOT) 22 15 - 37 U/L    Alk.  phosphatase 114 50 - 136 U/L    Protein, total 5.8 (L) 6.3 - 8.2 g/dL    Albumin 2.1 (L) 3.2 - 4.6 g/dL    Globulin 3.7 (H) 2.3 - 3.5 g/dL    A-G Ratio 0.6 (L) 1.2 - 3.5     CBC WITH AUTOMATED DIFF    Collection Time: 03/08/22  7:28 AM   Result Value Ref Range    WBC 12.8 (H) 4.3 - 11.1 K/uL    RBC 4.01 (L) 4.05 - 5.2 M/uL    HGB 12.0 11.7 - 15.4 g/dL    HCT 36.5 35.8 - 46.3 %    MCV 91.0 79.6 - 97.8 FL    MCH 29.9 26.1 - 32.9 PG    MCHC 32.9 31.4 - 35.0 g/dL    RDW 14.0 11.9 - 14.6 %    PLATELET 693 (L) 815 - 450 K/uL    MPV 10.0 9.4 - 12.3 FL    ABSOLUTE NRBC 0.00 0.0 - 0.2 K/uL    DF AUTOMATED      NEUTROPHILS 70 43 - 78 %    LYMPHOCYTES 19 13 - 44 %    MONOCYTES 10 4.0 - 12.0 %    EOSINOPHILS 1 0.5 - 7.8 %    BASOPHILS 0 0.0 - 2.0 %    IMMATURE GRANULOCYTES 1 0.0 - 5.0 %    ABS. NEUTROPHILS 8.9 (H) 1.7 - 8.2 K/UL    ABS. LYMPHOCYTES 2.4 0.5 - 4.6 K/UL    ABS. MONOCYTES 1.2 0.1 - 1.3 K/UL    ABS. EOSINOPHILS 0.1 0.0 - 0.8 K/UL    ABS. BASOPHILS 0.0 0.0 - 0.2 K/UL    ABS. IMM. GRANS. 0.1 0.0 - 0.5 K/UL   GLUCOSE, POC    Collection Time: 03/08/22  8:36 AM   Result Value Ref Range    Glucose (POC) 113 (H) 65 - 100 mg/dL    Performed by Trish    GLUCOSE, POC    Collection Time: 03/08/22 11:16 AM   Result Value Ref Range    Glucose (POC) 134 (H) 65 - 100 mg/dL    Performed by Trish    GLUCOSE, POC    Collection Time: 03/08/22  4:28 PM   Result Value Ref Range    Glucose (POC) 156 (H) 65 - 100 mg/dL    Performed by Trish    GLUCOSE, POC    Collection Time: 03/08/22  9:54 PM   Result Value Ref Range    Glucose (POC) 101 (H) 65 - 100 mg/dL    Performed by Dottie    PLEASE READ & DOCUMENT PPD TEST IN 48 HRS    Collection Time: 03/09/22  4:23 AM   Result Value Ref Range    PPD Negative Negative    mm Induration 0 0 - 5 mm   CBC WITH AUTOMATED DIFF    Collection Time: 03/09/22  5:41 AM   Result Value Ref Range    WBC 13.7 (H) 4.3 - 11.1 K/uL    RBC 2.72 (L) 4.05 - 5.2 M/uL    HGB 7.9 (L) 11.7 - 15.4 g/dL    HCT 25.2 (L) 35.8 - 46.3 %    MCV 92.6 79.6 - 97.8 FL    MCH 29.0 26.1 - 32.9 PG    MCHC 31.3 (L) 31.4 - 35.0 g/dL    RDW 14.3 11.9 - 14.6 %    PLATELET 474 296 - 437 K/uL    MPV 10.0 9.4 - 12.3 FL    ABSOLUTE NRBC 0.00 0.0 - 0.2 K/uL    DF AUTOMATED      NEUTROPHILS 65 43 - 78 %    LYMPHOCYTES 22 13 - 44 %    MONOCYTES 10 4.0 - 12.0 %    EOSINOPHILS 2 0.5 - 7.8 %    BASOPHILS 0 0.0 - 2.0 %    IMMATURE GRANULOCYTES 1 0.0 - 5.0 %    ABS. NEUTROPHILS 9.0 (H) 1.7 - 8.2 K/UL    ABS. LYMPHOCYTES 3.0 0.5 - 4.6 K/UL    ABS. MONOCYTES 1.4 (H) 0.1 - 1.3 K/UL    ABS. EOSINOPHILS 0.3 0.0 - 0.8 K/UL    ABS. BASOPHILS 0.0 0.0 - 0.2 K/UL    ABS. IMM.  GRANS. 0.1 0.0 - 0.5 K/UL   METABOLIC PANEL, BASIC    Collection Time: 03/09/22  5:41 AM   Result Value Ref Range    Sodium 136 136 - 145 mmol/L    Potassium 3.8 3.5 - 5.1 mmol/L    Chloride 109 (H) 98 - 107 mmol/L    CO2 19 (L) 21 - 32 mmol/L    Anion gap 8 7 - 16 mmol/L    Glucose 106 (H) 65 - 100 mg/dL    BUN 21 8 - 23 MG/DL    Creatinine 1.10 (H) 0.6 - 1.0 MG/DL    GFR est AA >60 >60 ml/min/1.73m2    GFR est non-AA 50 (L) >60 ml/min/1.73m2    Calcium 8.6 8.3 - 10.4 MG/DL   GLUCOSE, POC    Collection Time: 03/09/22  7:48 AM   Result Value Ref Range    Glucose (POC) 115 (H) 65 - 100 mg/dL    Performed by Kimball (Merritt)    HGB & HCT    Collection Time: 03/09/22 11:06 AM   Result Value Ref Range    HGB 7.8 (L) 11.7 - 15.4 g/dL    HCT 24.6 (L) 35.8 - 46.3 %   GLUCOSE, POC    Collection Time: 03/09/22 11:22 AM   Result Value Ref Range    Glucose (POC) 151 (H) 65 - 100 mg/dL    Performed by Kimball (Merritt)    TRANSFERRIN SATURATION    Collection Time: 03/09/22  3:20 PM   Result Value Ref Range    Iron 13 (L) 35 - 150 ug/dL    TIBC 151 (L) 250 - 450 ug/dL    Transferrin Saturation 9 (L) >20 %   FERRITIN    Collection Time: 03/09/22  3:20 PM   Result Value Ref Range    Ferritin 849 (H) 8 - 388 NG/ML   FOLATE    Collection Time: 03/09/22  3:20 PM   Result Value Ref Range    Folate 6.6 3.1 - 17.5 ng/mL   LD    Collection Time: 03/09/22  3:20 PM   Result Value Ref Range     110 - 210 U/L   RETICULOCYTE COUNT    Collection Time: 03/09/22  3:20 PM   Result Value Ref Range    Reticulocyte count 2.2 (H) 0.3 - 2.0 %    Absolute Retic Cnt. 0.0632 0.026 - 0.095 M/ul    Immature Retic Fraction 23.2 (H) 3.0 - 15.9 %    Retic Hgb Conc. 28 (L) 29 - 35 pg   GLUCOSE, POC    Collection Time: 03/09/22  4:16 PM   Result Value Ref Range    Glucose (POC) 123 (H) 65 - 100 mg/dL    Performed by Kimball (Merritt)        All Micro Results     Procedure Component Value Units Date/Time    CULTURE, URINE [[id number]]  (Abnormal)  (Susceptibility) Collected: 03/06/22 3715 Order Status: Completed Specimen: Urine from Clean catch Updated: 03/09/22 0822     Special Requests: NO SPECIAL REQUESTS        Culture result:       >100,000 COLONIES/mL ESCHERICHIA COLI          CULTURE, BLOOD [[id number]] Collected: 03/07/22 0929    Order Status: Completed Specimen: Blood Updated: 03/08/22 0811     Special Requests: --        RIGHT  FOREARM       Culture result: NO GROWTH AFTER 22 HOURS       CULTURE, BLOOD [[id number]] Collected: 03/07/22 0919    Order Status: Completed Specimen: Blood Updated: 03/08/22 0811     Special Requests: --        RIGHT  HAND       Culture result: NO GROWTH AFTER 22 HOURS             Other Studies:  No results found.     Current Meds:  Current Facility-Administered Medications   Medication Dose Route Frequency    [Held by provider] enoxaparin (LOVENOX) injection 40 mg  40 mg SubCUTAneous DAILY    cefTRIAXone (ROCEPHIN) 1 g in 0.9% sodium chloride (MBP/ADV) 50 mL MBP  1 g IntraVENous Q24H    sodium chloride (NS) flush 5-40 mL  5-40 mL IntraVENous Q8H    sodium chloride (NS) flush 5-40 mL  5-40 mL IntraVENous PRN    acetaminophen (TYLENOL) tablet 650 mg  650 mg Oral Q6H PRN    Or    acetaminophen (TYLENOL) suppository 650 mg  650 mg Rectal Q6H PRN    polyethylene glycol (MIRALAX) packet 17 g  17 g Oral DAILY PRN    ondansetron (ZOFRAN ODT) tablet 4 mg  4 mg Oral Q8H PRN    Or    ondansetron (ZOFRAN) injection 4 mg  4 mg IntraVENous Q6H PRN    insulin lispro (HUMALOG) injection   SubCUTAneous AC&HS    amLODIPine (NORVASC) tablet 5 mg  5 mg Oral DAILY    aspirin delayed-release tablet 81 mg  81 mg Oral DAILY    cetirizine (ZYRTEC) tablet 10 mg  10 mg Oral DAILY PRN    dorzolamide-timoloL (COSOPT) 22.3-6.8 mg/mL ophthalmic solution 1 Drop  1 Drop Both Eyes BID    furosemide (LASIX) tablet 20 mg  20 mg Oral DAILY    latanoprost (XALATAN) 0.005 % ophthalmic solution 1 Drop  1 Drop Both Eyes QHS    levothyroxine (SYNTHROID) tablet 50 mcg  50 mcg Oral ACB    losartan (COZAAR) tablet 100 mg  100 mg Oral DAILY    metoprolol succinate (TOPROL-XL) XL tablet 100 mg  100 mg Oral DAILY    pantoprazole (PROTONIX) tablet 40 mg  40 mg Oral ACB    pregabalin (LYRICA) capsule 50 mg  50 mg Oral BID    rosuvastatin (CRESTOR) tablet 20 mg  20 mg Oral QHS    HYDROcodone-acetaminophen (NORCO) 7.5-325 mg per tablet 1 Tablet  1 Tablet Oral Q6H PRN       Signed: Martha Medel DO    Part of this note may have been written by using a voice dictation software. The note has been proof read but may still contain some grammatical/other typographical errors.

## 2022-03-09 NOTE — PROGRESS NOTES
ACUTE PHYSICAL THERAPY GOALS:  (Developed with and agreed upon by patient and/or caregiver. )  LTG:  (1.)Ms. Mara Guzman will move from supine to sit and sit to supine , scoot up and down and roll side to side in bed with STAND BY ASSIST within 7 treatment day(s). (2.)Ms. Mara Guzman will transfer from bed to chair and chair to bed with CONTACT GUARD ASSIST using the least restrictive device within 7 treatment day(s). (3.)Ms. Mara Guzman will ambulate with CONTACT GUARD ASSIST for 150 feet with the least restrictive device within 7 treatment day(s). (4.)Ms. Mara Guzman will perform exercises per HEP for 15+ minutes to improve strength and mobility within 7 days. PHYSICAL THERAPY: Daily Note and AM Treatment Day # 3    Victor M Elisha Guzman is a 78 y.o. female   PRIMARY DIAGNOSIS: Humerus fracture  Fall [W19. XXXA]  Humerus fracture [S42.309A]  UTI (urinary tract infection) [N39.0]         ASSESSMENT:     REHAB RECOMMENDATIONS: CURRENT LEVEL OF FUNCTION:  (Most Recently Demonstrated)   Recommendation to date pending progress:  Setting:   Short-term Rehab  Equipment:    To Be Determined Bed Mobility:   Minimal Assistance-moderate assistance  Sit to Stand:   Minimal Assistance -moderate assistance x 2  Transfers:   Minimal Assistance -moderate assistance x2  Gait/Mobility:   Minimal Assistance -moderate assistance x 2     ASSESSMENT:  Ms. Mara Guzman is admitted with non operative left humerus fx- NWB in sling. Pt seems to be feeling better today, less dizzy in sitting and standing and less right trunk lean noted. Pt needs min-mod assist for transfer to sitting and min-mod assist of 1-2 for transfers, standing activities, functional transfers, and ambulation 25 ft x 2. Cueing for transfer and gait technique/mechanics. Returned to room and assisted to chair after activity, positioned comfortably in chair with needs in reach.  Good progress today, still unsteady in standing and needs significant assistance due to weakness, unsteady, and L UE NWB. Will need continued therapy during hospital stay and rehab at [facility]. SUBJECTIVE:   Ms. Chanel Smith states, \"I'm ready\"    SOCIAL HISTORY/ LIVING ENVIRONMENT: Lives alone. Mod I with rolling walker at baseline. Niece/other family members come to assist frequently.    Home Environment: Private residence  One/Two Story Residence: One story  Living Alone: Yes  Support Systems: Other Family Member(s)  OBJECTIVE:     PAIN: VITAL SIGNS: LINES/DRAINS:   Pre Treatment: Pain Screen  Pain Scale 1: Numeric (0 - 10)  Pain Intensity 1: 0  Post Treatment: 0/10 Vital Signs  O2 Device: None (Room air) IV  O2 Device: None (Room air)     MOBILITY: I Mod I S SBA CGA Min Mod Max Total  NT x2 Comments:   Bed Mobility    Rolling [] [] [] [] [] [] [] [] [] [] []    Supine to Sit [] [] [] [] [] [x] [x] [] [] [] []    Scooting [] [] [] [] [] [x] [x] [] [] [] []    Sit to Supine [] [] [] [] [] [] [] [] [] [] []    Transfers    Sit to Stand [] [] [] [] [] [x] [x] [] [] [] [x]    Bed to Chair [] [] [] [] [] [x] [x] [] [] [] [x]    Stand to Sit [] [] [] [] [] [x] [x] [] [] [] [x]    I=Independent, Mod I=Modified Independent, S=Supervision, SBA=Standby Assistance, CGA=Contact Guard Assistance,   Min=Minimal Assistance, Mod=Moderate Assistance, Max=Maximal Assistance, Total=Total Assistance, NT=Not Tested    BALANCE: Good Fair+ Fair Fair- Poor NT Comments   Sitting Static [] [x] [] [] [] []    Sitting Dynamic [] [x] [] [] [] []              Standing Static [] [] [] [x] [] []    Standing Dynamic [] [] [] [x] [] []      GAIT: I Mod I S SBA CGA Min Mod Max Total  NT x2 Comments:   Level of Assistance [] [] [] [] [] [x] [x] [] [] [] [x]    Distance 25 ft x 2    DME handheld assist    Gait Quality Unsteady, weak    Weightbearing  Status NWB L UE in sling    I=Independent, Mod I=Modified Independent, S=Supervision, SBA=Standby Assistance, CGA=Contact Guard Assistance,   Min=Minimal Assistance, Mod=Moderate Assistance, Max=Maximal Assistance, Total=Total Assistance, NT=Not Tested    PLAN:   FREQUENCY/DURATION: PT Plan of Care: 3 times/week for duration of hospital stay or until stated goals are met, whichever comes first.  TREATMENT:     TREATMENT:   ($$ Therapeutic Activity: 23-37 mins    )  Therapeutic Activity (29 Minutes): Therapeutic activity included Supine to Sit, Scooting, Transfer Training, Ambulation on level ground, Sitting balance , Standing balance and toilet transfers, standing activities, chair transfer to improve functional Mobility, Strength, Activity tolerance and balance.     TREATMENT GRID:   Date:  3/8/22 Date:   Date:     Activity/Exercise Parameters Parameters Parameters   LAQ 10x     Seated marching 10x     Seated hip abduction 10x     Ankle pumps 10x                             AFTER TREATMENT POSITION/PRECAUTIONS:  Chair, Needs within reach and RN notified    INTERDISCIPLINARY COLLABORATION:  RN/PCT, PT/PTA and OT/ZAIDI    TOTAL TREATMENT DURATION:  PT Patient Time In/Time Out  Time In: 1131  Time Out: [address] Bow[address] Koehler DPT

## 2022-03-09 NOTE — PROGRESS NOTES
Report received from off going nurse. Patient resting in bed and arouses easily to verbal stimulation. Patient denies any needs at this time. No s/s of acute distress on room air. Safety measures in place. Will continue to monitor.

## 2022-03-09 NOTE — PROGRESS NOTES
Dr Randell Peters notified via perfect serve:Hgb dropped from 12 yesterday to 7.9 today. Orders received to type and screen patient if not already done.

## 2022-03-09 NOTE — PROGRESS NOTES
ACUTE OT GOALS:  (Developed with and agreed upon by patient and/or caregiver.)  1. Patient will complete lower body bathing and dressing with MIN A and adaptive equipment as needed. 2.Patient will complete upper body bathing and dressing with MIN A and adaptive equipment as needed. 3. Patient will complete toileting with MIN A. GOAL MET 3/9/22  4. Patient will tolerate 25 minutes of OT treatment with 1-2 rest breaks to increase activity tolerance for ADLs. GOAL MET 3/9/22  5. Patient will complete functional transfers with SBA and adaptive equipment as needed. 6. Patient will complete functional activity with SUPERVISION and adaptive equipment as needed.     Timeframe: 7 visits     OCCUPATIONAL THERAPY: Daily Note OT Treatment Day # 2    Usama Mccarthy is a 78 y.o. female   PRIMARY DIAGNOSIS: Humerus fracture  Fall [W19. XXXA]  Humerus fracture [S42.309A]  UTI (urinary tract infection) [N39.0]       [place] / Plan[place] / Product Type: Medicare /   ASSESSMENT:     REHAB RECOMMENDATIONS: CURRENT LEVEL OF FUNCTION:  (Most Recently Demonstrated)   Recommendation to date pending progress:  Setting:   Short-term Rehab  Equipment:    To Be Determined Bathing:   Not tested  Dressing:   Moderate Assistance  Feeding/Grooming:   Not tested  Toileting:   Contact Guard Assistance  Functional Mobility:   Minimal Assistance x 2     ASSESSMENT:  Ms. Mendel Pintos presented to the hospital following fall and was found to have L humerus fx. Per ortho, pt is non-op with conservative management. NWB LUE with LUE in sling. Today pt presents with decreased activity tolerance, balance, strength and mobility impacting ADLs. Pt overall min A x2 HHA for functional transfers and mobility of household distances. Pt able to ambulate into bathroom today and required CGA for hieu care in sitting. Pt noticeably SOB after ambulation, however O2 sats >95% on RA.  Pt educated on pursed lip breathing and provided RB for pt prior to ambulating back to room. Pt increased activity tolerance today and is progressing toward goals, goals updated. Continue POC. SUBJECTIVE:   Ms. Braden Bhandari states, \"I made it! \"    SOCIAL HISTORY/LIVING ENVIRONMENT: lives alone in a one level home, 2 steps to enter, uses RW in home and [facility] outside home, tub shower with transfer bench, independent for ADLs and household IADLs, assist from community-level IADLs  Home Environment: Private residence  One/Two Story Residence: One story  Living Alone: Yes  Support Systems: Other Family Member(s)    OBJECTIVE:     PAIN: VITAL SIGNS: LINES/DRAINS:   Pre Treatment: Pain Screen  Pain Scale 1: Numeric (0 - 10)  Pain Intensity 1: 0  Post Treatment: 0 Vital Signs  O2 Sat (%): 95 %  O2 Device: None (Room air) IV and Purewick  O2 Device: None (Room air)     ACTIVITIES OF DAILY LIVING: I Mod I S SBA CGA Min Mod Max Total NT Comments   BASIC ADLs:              Bathing/ Showering [] [] [] [] [] [] [] [] [] [x]    Toileting [] [] [] [] [x] [x] [] [] [] [] Min A x2 HHA for transfer onto toilet, CGCA for hieu care sitting on toilet   Dressing [] [] [x] [] [] [] [x] [] [] [] Set up donning slip on shoes EOB, mod A donning/doffing brief in standing   Feeding [] [] [] [] [] [] [] [] [] [x]    Grooming [] [] [] [] [] [] [] [] [] [x]    Personal Device Care [] [] [] [] [] [] [] [] [] [x]    Functional Mobility [] [] [] [] [] [] [x] [] [] [] x2 HHA   I=Independent, Mod I=Modified Independent, S=Supervision, SBA=Standby Assistance, CGA=Contact Guard Assistance,   Min=Minimal Assistance, Mod=Moderate Assistance, Max=Maximal Assistance, Total=Total Assistance, NT=Not Tested    MOBILITY: I Mod I S SBA CGA Min Mod Max Total  NT x2 Comments:   Supine to sit [] [] [] [] [] [] [x] [] [] [] [x]    Sit to supine [] [] [] [] [] [] [] [] [] [x] []    Sit to stand [] [] [] [] [] [] [x] [] [] [] [x] HHA   Bed to chair [] [] [] [] [] [x] [] [] [] [] [x] HHA   I=Independent, Mod I=Modified Independent, S=Supervision, SBA=Standby Assistance, CGA=Contact Guard Assistance,   Min=Minimal Assistance, Mod=Moderate Assistance, Max=Maximal Assistance, Total=Total Assistance, NT=Not Tested    BALANCE: Good Fair+ Fair Fair- Poor NT Comments   Sitting Static [] [x] [] [] [] [] On toilet   Sitting Dynamic [] [] [x] [] [] [] Molly care sitting on commode             Standing Static [] [x] [] [] [] [] Standing EOB with HHA   Standing Dynamic [] [] [x] [] [] [] Ambulating min A x2 HHA     PLAN:   FREQUENCY/DURATION: OT Plan of Care: 3 times/week for duration of hospital stay or until stated goals are met, whichever comes first.    TREATMENT:   TREATMENT:   ($$ Self Care/Home Management: 23-37 mins    )  Co-Treatment PT/OT necessary due to patient's decreased overall endurance/tolerance levels, as well as need for high level skilled assistance to complete functional transfers/mobility and functional tasks  Self Care (26 Minutes): Self care including Toileting, Lower Body Dressing, Energy Conservation Training and functional transfers in prep for ADLs and ambulating household distances to increase independence and decrease level of assistance required.     TREATMENT GRID:  N/A    AFTER TREATMENT POSITION/PRECAUTIONS:  Chair, Needs within reach, RN notified and Visitors at bedside    INTERDISCIPLINARY COLLABORATION:  RN/PCT, PT/PTA and OT/ZAIDI    TOTAL TREATMENT DURATION:  OT Patient Time In/Time Out  Time In: [address]  Time Out: [address] 3777, OT

## 2022-03-09 NOTE — PROGRESS NOTES
RNCM met with patient and family member in room [id number] to discuss referral  For [facility]  Patient submitted 3 facilities   1: [address]  2:[facility]  3: [address] [facility]   Referral submitted via [facility] [address] pending bed availability.   Will continue to follow for discharge planning needs  Please consult  if any new issues arise    Discharge plan is  [facility] pending referral acceptance

## 2022-03-10 ENCOUNTER — APPOINTMENT (OUTPATIENT)
Dept: GENERAL RADIOLOGY | Age: 82
DRG: 872 | End: 2022-03-10
Attending: INTERNAL MEDICINE
Payer: MEDICARE

## 2022-03-10 ENCOUNTER — APPOINTMENT (OUTPATIENT)
Dept: CT IMAGING | Age: 82
DRG: 872 | End: 2022-03-10
Attending: INTERNAL MEDICINE
Payer: MEDICARE

## 2022-03-10 PROBLEM — A41.9 SEPSIS (HCC): Status: ACTIVE | Noted: 2022-03-10

## 2022-03-10 LAB
ANION GAP SERPL CALC-SCNC: 9 MMOL/L (ref 7–16)
BASOPHILS # BLD: 0.1 K/UL (ref 0–0.2)
BASOPHILS NFR BLD: 0 % (ref 0–2)
BUN SERPL-MCNC: 24 MG/DL (ref 8–23)
CALCIUM SERPL-MCNC: 8.4 MG/DL (ref 8.3–10.4)
CHLORIDE SERPL-SCNC: 107 MMOL/L (ref 98–107)
CO2 SERPL-SCNC: 20 MMOL/L (ref 21–32)
CREAT SERPL-MCNC: 1.2 MG/DL (ref 0.6–1)
DIFFERENTIAL METHOD BLD: ABNORMAL
EOSINOPHIL # BLD: 0.3 K/UL (ref 0–0.8)
EOSINOPHIL NFR BLD: 2 % (ref 0.5–7.8)
ERYTHROCYTE [DISTWIDTH] IN BLOOD BY AUTOMATED COUNT: 14 % (ref 11.9–14.6)
GLUCOSE BLD STRIP.AUTO-MCNC: 105 MG/DL (ref 65–100)
GLUCOSE BLD STRIP.AUTO-MCNC: 111 MG/DL (ref 65–100)
GLUCOSE BLD STRIP.AUTO-MCNC: 117 MG/DL (ref 65–100)
GLUCOSE BLD STRIP.AUTO-MCNC: 134 MG/DL (ref 65–100)
GLUCOSE SERPL-MCNC: 108 MG/DL (ref 65–100)
HAPTOGLOB SERPL-MCNC: 418 MG/DL (ref 30–200)
HCT VFR BLD AUTO: 23.8 % (ref 35.8–46.3)
HGB BLD-MCNC: 7.6 G/DL (ref 11.7–15.4)
IMM GRANULOCYTES # BLD AUTO: 0.2 K/UL (ref 0–0.5)
IMM GRANULOCYTES NFR BLD AUTO: 1 % (ref 0–5)
LYMPHOCYTES # BLD: 3.3 K/UL (ref 0.5–4.6)
LYMPHOCYTES NFR BLD: 23 % (ref 13–44)
MCH RBC QN AUTO: 29.7 PG (ref 26.1–32.9)
MCHC RBC AUTO-ENTMCNC: 31.9 G/DL (ref 31.4–35)
MCV RBC AUTO: 93 FL (ref 79.6–97.8)
MM INDURATION POC: 0 MM (ref 0–5)
MONOCYTES # BLD: 1.6 K/UL (ref 0.1–1.3)
MONOCYTES NFR BLD: 11 % (ref 4–12)
NEUTS SEG # BLD: 8.9 K/UL (ref 1.7–8.2)
NEUTS SEG NFR BLD: 62 % (ref 43–78)
NRBC # BLD: 0 K/UL (ref 0–0.2)
PLATELET # BLD AUTO: 258 K/UL (ref 150–450)
PMV BLD AUTO: 10 FL (ref 9.4–12.3)
POTASSIUM SERPL-SCNC: 3.6 MMOL/L (ref 3.5–5.1)
PPD POC: NEGATIVE NEGATIVE
RBC # BLD AUTO: 2.56 M/UL (ref 4.05–5.2)
SERVICE CMNT-IMP: ABNORMAL
SODIUM SERPL-SCNC: 136 MMOL/L (ref 136–145)
WBC # BLD AUTO: 14.2 K/UL (ref 4.3–11.1)

## 2022-03-10 PROCEDURE — 77030038269 HC DRN EXT URIN PURWCK BARD -A

## 2022-03-10 PROCEDURE — 74011250637 HC RX REV CODE- 250/637: Performed by: HOSPITALIST

## 2022-03-10 PROCEDURE — 74011000258 HC RX REV CODE- 258: Performed by: INTERNAL MEDICINE

## 2022-03-10 PROCEDURE — 36415 COLL VENOUS BLD VENIPUNCTURE: CPT

## 2022-03-10 PROCEDURE — 80048 BASIC METABOLIC PNL TOTAL CA: CPT

## 2022-03-10 PROCEDURE — 65270000029 HC RM PRIVATE

## 2022-03-10 PROCEDURE — 74011000250 HC RX REV CODE- 250: Performed by: INTERNAL MEDICINE

## 2022-03-10 PROCEDURE — 71045 X-RAY EXAM CHEST 1 VIEW: CPT

## 2022-03-10 PROCEDURE — 74011250636 HC RX REV CODE- 250/636: Performed by: INTERNAL MEDICINE

## 2022-03-10 PROCEDURE — 85025 COMPLETE CBC W/AUTO DIFF WBC: CPT

## 2022-03-10 PROCEDURE — 2709999900 HC NON-CHARGEABLE SUPPLY

## 2022-03-10 PROCEDURE — 97530 THERAPEUTIC ACTIVITIES: CPT

## 2022-03-10 PROCEDURE — 82962 GLUCOSE BLOOD TEST: CPT

## 2022-03-10 PROCEDURE — 74011250637 HC RX REV CODE- 250/637: Performed by: EMERGENCY MEDICINE

## 2022-03-10 PROCEDURE — 74011250637 HC RX REV CODE- 250/637: Performed by: INTERNAL MEDICINE

## 2022-03-10 RX ORDER — VANCOMYCIN HYDROCHLORIDE
1250 EVERY 24 HOURS
Status: DISCONTINUED | OUTPATIENT
Start: 2022-03-11 | End: 2022-03-12

## 2022-03-10 RX ORDER — NYSTATIN 100000 [USP'U]/G
POWDER TOPICAL 2 TIMES DAILY
Status: DISCONTINUED | OUTPATIENT
Start: 2022-03-10 | End: 2022-03-14 | Stop reason: HOSPADM

## 2022-03-10 RX ORDER — SODIUM CHLORIDE 9 MG/ML
50 INJECTION, SOLUTION INTRAVENOUS CONTINUOUS
Status: DISPENSED | OUTPATIENT
Start: 2022-03-10 | End: 2022-03-13

## 2022-03-10 RX ORDER — VANCOMYCIN 2 GRAM/500 ML IN 0.9 % SODIUM CHLORIDE INTRAVENOUS
2000 ONCE
Status: COMPLETED | OUTPATIENT
Start: 2022-03-10 | End: 2022-03-10

## 2022-03-10 RX ADMIN — DORZOLAMIDE HYDROCHLORIDE AND TIMOLOL MALEATE 1 DROP: 20; 5 SOLUTION/ DROPS OPHTHALMIC at 08:35

## 2022-03-10 RX ADMIN — ROSUVASTATIN CALCIUM 20 MG: 20 TABLET, FILM COATED ORAL at 21:32

## 2022-03-10 RX ADMIN — HYDROCODONE BITARTRATE AND ACETAMINOPHEN 1 TABLET: 7.5; 325 TABLET ORAL at 02:42

## 2022-03-10 RX ADMIN — HYDROCODONE BITARTRATE AND ACETAMINOPHEN 1 TABLET: 7.5; 325 TABLET ORAL at 16:29

## 2022-03-10 RX ADMIN — Medication: at 23:02

## 2022-03-10 RX ADMIN — LATANOPROST 1 DROP: 50 SOLUTION/ DROPS OPHTHALMIC at 21:33

## 2022-03-10 RX ADMIN — NYSTATIN: 100000 POWDER TOPICAL at 23:01

## 2022-03-10 RX ADMIN — PANTOPRAZOLE SODIUM 40 MG: 40 TABLET, DELAYED RELEASE ORAL at 06:34

## 2022-03-10 RX ADMIN — DORZOLAMIDE HYDROCHLORIDE AND TIMOLOL MALEATE 1 DROP: 20; 5 SOLUTION/ DROPS OPHTHALMIC at 18:38

## 2022-03-10 RX ADMIN — SODIUM CHLORIDE 100 ML/HR: 900 INJECTION, SOLUTION INTRAVENOUS at 18:19

## 2022-03-10 RX ADMIN — SODIUM CHLORIDE, PRESERVATIVE FREE 10 ML: 5 INJECTION INTRAVENOUS at 06:36

## 2022-03-10 RX ADMIN — ASPIRIN 81 MG: 81 TABLET ORAL at 08:28

## 2022-03-10 RX ADMIN — PREGABALIN 50 MG: 50 CAPSULE ORAL at 21:32

## 2022-03-10 RX ADMIN — SODIUM CHLORIDE, PRESERVATIVE FREE 10 ML: 5 INJECTION INTRAVENOUS at 14:00

## 2022-03-10 RX ADMIN — CEFTRIAXONE 1 G: 1 INJECTION, POWDER, FOR SOLUTION INTRAMUSCULAR; INTRAVENOUS at 18:19

## 2022-03-10 RX ADMIN — SODIUM CHLORIDE, PRESERVATIVE FREE 10 ML: 5 INJECTION INTRAVENOUS at 21:31

## 2022-03-10 RX ADMIN — SODIUM CHLORIDE 100 ML/HR: 900 INJECTION, SOLUTION INTRAVENOUS at 08:34

## 2022-03-10 RX ADMIN — FUROSEMIDE 20 MG: 40 TABLET ORAL at 08:28

## 2022-03-10 RX ADMIN — LEVOTHYROXINE SODIUM 50 MCG: 0.05 TABLET ORAL at 06:34

## 2022-03-10 RX ADMIN — VANCOMYCIN HYDROCHLORIDE 2000 MG: 10 INJECTION, POWDER, LYOPHILIZED, FOR SOLUTION INTRAVENOUS at 12:44

## 2022-03-10 RX ADMIN — PREGABALIN 50 MG: 50 CAPSULE ORAL at 08:28

## 2022-03-10 NOTE — PROGRESS NOTES
[facility] can take patient tomorrow if patient is ready for transfer. RNCM perfect Served MD for answer if patient will be medically stable for transfer to [address] [place] [address] tomorrow.   Patient will need a Rapid Covid for admission to facility  Will continue to follow for discharge planning needs  Please consult  if any new issues arise  Discharge plan is [address] [place] [address]

## 2022-03-10 NOTE — PROGRESS NOTES
ACUTE PHYSICAL THERAPY GOALS:  (Developed with and agreed upon by patient and/or caregiver. )  LTG:  (1.)Ms. Sienna Hinds will move from supine to sit and sit to supine , scoot up and down and roll side to side in bed with STAND BY ASSIST within 7 treatment day(s). (2.)Ms. Sienna Hinds will transfer from bed to chair and chair to bed with CONTACT GUARD ASSIST using the least restrictive device within 7 treatment day(s). (3.)Ms. Sienna Hinds will ambulate with CONTACT GUARD ASSIST for 150 feet with the least restrictive device within 7 treatment day(s). (4.)Ms. Sienna Hinds will perform exercises per HEP for 15+ minutes to improve strength and mobility within 7 days. PHYSICAL THERAPY: Daily Note and AM Treatment Day # 4    Estelle Hinds is a 78 y.o. female   PRIMARY DIAGNOSIS: Humerus fracture  Fall [W19. XXXA]  Humerus fracture [S42.309A]  UTI (urinary tract infection) [N39.0]         ASSESSMENT:     REHAB RECOMMENDATIONS: CURRENT LEVEL OF FUNCTION:  (Most Recently Demonstrated)   Recommendation to date pending progress:  Setting:   Short-term Rehab  Equipment:    To Be Determined Bed Mobility:   Minimal Assistance-moderate assistance  Sit to Stand:   Moderate Assistance    Transfers:   Minimal Assistance -moderate assistance x2  Gait/Mobility:   Not tested      ASSESSMENT:  Ms. Sienna Hinds was supine in bed on arrival. She is agreeable to PT and would like to get up in chair. Her bed was soaking wet with urine. Bed mobility with min/mod A and extra time. She stood and transferred over to chair with min/mod A x2. Sit to stand multiple times for self care. She was left sitting up in chair with needs in reach. SUBJECTIVE:   Ms. Sienna Hinds states, \"I didn't know I was wet\"    SOCIAL HISTORY/ LIVING ENVIRONMENT: Lives alone. Mod I with rolling walker at baseline. Niece/other family members come to assist frequently.    Home Environment: Private residence  One/Two Story Residence: One story  Living Alone: Yes  Support Systems: Other Family Member(s)  OBJECTIVE:     PAIN: VITAL SIGNS: LINES/DRAINS:   Pre Treatment:   not rated  Post Treatment: not rated   IV  O2 Device: None (Room air)     MOBILITY: I Mod I S SBA CGA Min Mod Max Total  NT x2 Comments:   Bed Mobility    Rolling [] [] [] [] [] [] [] [] [] [] []    Supine to Sit [] [] [] [] [] [x] [x] [] [] [] []    Scooting [] [] [] [] [] [x] [x] [] [] [] []    Sit to Supine [] [] [] [] [] [] [] [] [] [] []    Transfers    Sit to Stand [] [] [] [] [] [] [x] [] [] [] [x]    Bed to Chair [] [] [] [] [] [x] [x] [] [] [] [x]    Stand to Sit [] [] [] [] [] [x] [x] [] [] [] [x]    I=Independent, Mod I=Modified Independent, S=Supervision, SBA=Standby Assistance, CGA=Contact Guard Assistance,   Min=Minimal Assistance, Mod=Moderate Assistance, Max=Maximal Assistance, Total=Total Assistance, NT=Not Tested    BALANCE: Good Fair+ Fair Fair- Poor NT Comments   Sitting Static [] [x] [] [] [] []    Sitting Dynamic [] [x] [] [] [] []              Standing Static [] [] [] [x] [] []    Standing Dynamic [] [] [] [x] [] []      GAIT: I Mod I S SBA CGA Min Mod Max Total  NT x2 Comments:   Level of Assistance [] [] [] [] [] [] [] [] [] [] []    Distance     DME handheld assist    Gait Quality Unsteady, weak    Weightbearing  Status NWB L UE in sling    I=Independent, Mod I=Modified Independent, S=Supervision, SBA=Standby Assistance, CGA=Contact Guard Assistance,   Min=Minimal Assistance, Mod=Moderate Assistance, Max=Maximal Assistance, Total=Total Assistance, NT=Not Tested    PLAN:   FREQUENCY/DURATION: PT Plan of Care: 3 times/week for duration of hospital stay or until stated goals are met, whichever comes first.  TREATMENT:     TREATMENT:   ($$ Therapeutic Activity: 38-52 mins    )  Therapeutic Activity (40 Minutes):  Therapeutic activity included Supine to Sit, Scooting, Transfer Training, Ambulation on level ground, Sitting balance  and Standing balance to improve functional Mobility, Strength and Activity tolerance.     TREATMENT GRID:   Date:  3/8/22 Date:   Date:     Activity/Exercise Parameters Parameters Parameters   LAQ 10x     Seated marching 10x     Seated hip abduction 10x     Ankle pumps 10x                         AFTER TREATMENT POSITION/PRECAUTIONS:  Chair, Needs within reach and RN notified    INTERDISCIPLINARY COLLABORATION:  RN/PCT, PT/PTA and Rehab Attendant     TOTAL TREATMENT DURATION:  PT Patient Time In/Time Out  Time In: 1305  Time Out: JAMES Maloney

## 2022-03-10 NOTE — PROGRESS NOTES
Bedside, Verbal and Written shift change report given to [facility] AT [facility], [address] [facility] (oncoming nurse) by Alicia Khan RN (offgoing nurse). Report included the following information SBAR, Kardex, Intake/Output, MAR, Accordion and Recent Results. Dual skin assessment performed, sacral pressure sore noted and documented in chart. No gtts running.

## 2022-03-10 NOTE — PROGRESS NOTES
Hospitalist Progress Note   Admit Date:  3/6/2022  8:22 PM   Name:  Alicia Mckeon   Age:  78 y.o. Sex:  female  :  1940   MRN:  [id number]   Room:  [facility]/    Presenting Complaint: Fall and Head Injury    Reason(s) for Admission: Fall [W19. XXXA]  Humerus fracture [S42.309A]  UTI (urinary tract infection) [N39.0]     Hospital Course & Interval History:     Alicia Mckeon is a 78 y.o. female with a past medical history significant for hypertension, hypothyroidism and diabetes. The patient lives at home alone but has family members checking in on her. She walks with a walker she had a fall on Thursday. She denies feeling lightheaded dizzy or having any chest pain she states she has shortness of breath at baseline but it was not particularly worse at that time.     She stayed on the ground for about 2-1/2 to 3 hours until family member found her because she injured her left arm and was not able to transfer 90 alert bracelet properly. The family member called the medic alert people and they came and helped the patient from the ground to chair. They did not think that her arm was broken as she had the use of her hand and fingers. However over the last several days she is not being able to walk with her walker and therefore family members were not able to care for her. She was brought to the hospital today with them requesting that she be placed for rehab.     In the ER she was found to have a left proximal humeral fracture, and leukocytosis and a UTI.     Hospitalist service requested for further inpatient management. Subjective/24hr Events (03/10/22): Patient examined at bedside. Did have observed MAPs in the low 60s overnight. Pain in left shoulder under control. Has not noticed any bleeding. No fevers/chills, chest pain, shortness of breath/cough, abdominal pain, nausea/vomiting or diarrhea. ROS:  10 systems reviewed and negative except as noted above.      Assessment & Plan: Principal Problem:    Humerus fracture (3/6/2022)  - nonsurgical management, per ortho  - PT/OT  - supportive management    # DOUG on CKD  - improving overall  - restarted Lasix and losartan  - avoid nephrotoxic meds, NSAIDs, IV contrast  - strict I's/O's  - daily BMPs    # Acute normocytic anemia  - drop to upper 7's  - ?no overt signs of bleeding  - anemia studies (iron studies, B12/folate, haptoglobin/LDH, reticulocyte count, occult blood) have been unrevealing  - hold Lovenox  - avoid NSAIDs  - transfuse for Hgb<7 and/or brisk bleeding  - monitor     # ? Sepsis  - rising WBC count and episode of hypotension overnight  - hold antihypertensives  - add vancomycin with pharmacy to dose  - repeat CXR  - CTX empirically (day 4)  - follow cultures, growing GNR>100 pansensitive E. coli      Diabetic polyneuropathy ([facility] (2/1/2017)  - home Lyrica      Type 2 diabetes mellitus ([facility] (4/5/2017)  - hold home meds  - SSI and serial CBGs      Hypoactive thyroid (4/5/2017)   - Synthroid       Class 2 severe obesity due to excess calories with serious comorbidity in adult [facility]) (7/6/6861)  - complicates course of hospitalization      Essential hypertension (5/4/2017)   - hold antihypertensives due to concern for sepsis concern as above      Coronary artery disease involving native coronary artery of native heart without angina pectoris (5/31/2017)   - ASA and statin      Adult failure to thrive (3/7/2022)   - due to generalized weakness and above    F/E/N: stop fluids, replete electrolytes as needed, regular diet    Ppx: Lovenox for VTE    Code Status: FULL CODE    Disposition: pending clinical improvement with plan as above, needs STR at discharge. PT/OT consults and PPD ordered. Discussed with patient at bedside. All questions answered.      Hospital Problems as of 3/10/2022 Date Reviewed: 12/22/2021          Codes Class Noted - Resolved POA    Sepsis ([facility] ICD-10-CM: A41.9  ICD-9-CM: 038.9, 995.91  3/10/2022 - Present Unknown        Adult failure to thrive ICD-10-CM: R62.7  ICD-9-CM: 783.7  3/7/2022 - Present Unknown        Acute kidney injury superimposed on chronic kidney disease (HCC) ICD-10-CM: N17.9, N18.9  ICD-9-CM: 866.00, 585.9  3/7/2022 - Present Unknown        UTI (urinary tract infection) ICD-10-CM: N39.0  ICD-9-CM: 599.0  3/6/2022 - Present Unknown        * (Principal) Humerus fracture ICD-10-CM: S42.309A  ICD-9-CM: 812.20  3/6/2022 - Present Unknown        Fall ICD-10-CM: W19. Magalys Roof  ICD-9-CM: E888.9  3/6/2022 - Present Unknown        Coronary artery disease involving native coronary artery of native heart without angina pectoris ICD-10-CM: I25.10  ICD-9-CM: 414.01  5/31/2017 - Present Yes        Essential hypertension ICD-10-CM: I10  ICD-9-CM: 401.9  5/4/2017 - Present Yes        Type 2 diabetes mellitus ([facility] ICD-10-CM: E11.9  ICD-9-CM: 250.00  4/5/2017 - Present Yes    Overview Signed 4/5/2017  2:43 PM by Anabella TEAGUE     Last Assessment & Plan:   Check hemoglobin A1c. SSI with FSBG checks qAC and qhs. Adjust insulin as needed based on FSBG results. Diabetic diet and education as needed. Hypoactive thyroid ICD-10-CM: E03.9  ICD-9-CM: 244.9  4/5/2017 - Present Yes    Overview Signed 4/5/2017  2:43 PM by Anabella TEAGUE     Last Assessment & Plan:   Continue home thyroid replacement meds. Check TSH in the AM and consider med adjustments based on results. Class 2 severe obesity due to excess calories with serious comorbidity in adult [facility]) ICD-10-CM: E66.01  ICD-9-CM: 278.01  4/5/2017 - Present Yes        Diabetic polyneuropathy ([facility] ICD-10-CM: E11.42  ICD-9-CM: 250.60, 357.2  2/1/2017 - Present Yes    Overview Signed 4/5/2017  2:43 PM by Anabella TEAGUE     Last Assessment & Plan:   The patient's clinical history and presentation is more consistent with a diabetic neuropathy that is new onset in this patient with long-standing diabetes mellitus type 2 and diabetic nephropathy.   I will start her on Lyrica therapy starting today. Physical therapy to evaluate for mobility on this drug. We will continue to follow closely. No clinical signs or symptoms of gout or cellulitis. Chronic kidney disease ICD-10-CM: N18.9  ICD-9-CM: 585.9  2/18/2016 - Present Yes    Overview Signed 4/5/2017  2:43 PM by Sisto Lesch A     Last Assessment & Plan: This is likely from diabetic nephropathy and long-standing diabetes mellitus type 2  Continue current care and give IV fluids overnight. Repeat BMP in the morning. Objective:     Patient Vitals for the past 24 hrs:   Temp Pulse Resp BP SpO2   03/10/22 1057 98 °F (36.7 °C) 64 18 (!) 103/53 96 %   03/10/22 0731 98 °F (36.7 °C) 61 18 (!) 122/52 97 %   03/10/22 0448 -- -- -- (!) 110/55 --   03/10/22 0348 98.5 °F (36.9 °C) 67 16 (!) 101/50 96 %   03/10/22 0146 99 °F (37.2 °C) -- -- -- --   03/09/22 2355 (!) 100.6 °F (38.1 °C) 73 18 (!) 143/62 96 %   03/09/22 2001 99.7 °F (37.6 °C) 76 15 (!) 141/59 98 %   03/09/22 1843 99 °F (37.2 °C) 75 -- (!) 149/62 99 %     Oxygen Therapy  O2 Sat (%): 96 % (03/10/22 1057)  Pulse via Oximetry: 67 beats per minute (03/10/22 0348)  O2 Device: None (Room air) (03/10/22 0348)    Estimated body mass index is 37.18 kg/m² as calculated from the following:    Height as of this encounter: 5' 1.5\" (1.562 m). Weight as of this encounter: 90.7 kg (200 lb). Intake/Output Summary (Last 24 hours) at 3/10/2022 1347  Last data filed at 3/10/2022 1239  Gross per 24 hour   Intake 590 ml   Output 2900 ml   Net -2310 ml         Physical Exam:     Blood pressure (!) 103/53, pulse 64, temperature 98 °F (36.7 °C), resp. rate 18, height 5' 1.5\" (1.562 m), weight 90.7 kg (200 lb), SpO2 96 %. General:    Well nourished. No overt distress  Head:  Normocephalic, atraumatic  Eyes:  Sclerae appear normal.  Pupils equally round. ENT:  Nares appear normal, no drainage. Moist oral mucosa  Neck:  No restricted ROM. Trachea midline   CV:   RRR. No jugular venous distension. Lungs:   CTAB. No wheezing, rhonchi, or rales. Respirations even, unlabored  Abdomen: Bowel sounds present. Soft, nontender, nondistended. Extremities: No cyanosis or clubbing. No edema  Skin:     No rashes and normal coloration. Warm and dry. Neuro:  CN II-XII grossly intact. Sensation intact. A&Ox3  Psych:  Normal mood and affect. I have reviewed ordered lab tests and independently visualized imaging below:    Recent Labs:  Recent Results (from the past 48 hour(s))   GLUCOSE, POC    Collection Time: 03/08/22  4:28 PM   Result Value Ref Range    Glucose (POC) 156 (H) 65 - 100 mg/dL    Performed by Trish    GLUCOSE, POC    Collection Time: 03/08/22  9:54 PM   Result Value Ref Range    Glucose (POC) 101 (H) 65 - 100 mg/dL    Performed by Dottie    PLEASE READ & DOCUMENT PPD TEST IN 48 HRS    Collection Time: 03/09/22  4:23 AM   Result Value Ref Range    PPD Negative Negative    mm Induration 0 0 - 5 mm   CBC WITH AUTOMATED DIFF    Collection Time: 03/09/22  5:41 AM   Result Value Ref Range    WBC 13.7 (H) 4.3 - 11.1 K/uL    RBC 2.72 (L) 4.05 - 5.2 M/uL    HGB 7.9 (L) 11.7 - 15.4 g/dL    HCT 25.2 (L) 35.8 - 46.3 %    MCV 92.6 79.6 - 97.8 FL    MCH 29.0 26.1 - 32.9 PG    MCHC 31.3 (L) 31.4 - 35.0 g/dL    RDW 14.3 11.9 - 14.6 %    PLATELET 666 882 - 478 K/uL    MPV 10.0 9.4 - 12.3 FL    ABSOLUTE NRBC 0.00 0.0 - 0.2 K/uL    DF AUTOMATED      NEUTROPHILS 65 43 - 78 %    LYMPHOCYTES 22 13 - 44 %    MONOCYTES 10 4.0 - 12.0 %    EOSINOPHILS 2 0.5 - 7.8 %    BASOPHILS 0 0.0 - 2.0 %    IMMATURE GRANULOCYTES 1 0.0 - 5.0 %    ABS. NEUTROPHILS 9.0 (H) 1.7 - 8.2 K/UL    ABS. LYMPHOCYTES 3.0 0.5 - 4.6 K/UL    ABS. MONOCYTES 1.4 (H) 0.1 - 1.3 K/UL    ABS. EOSINOPHILS 0.3 0.0 - 0.8 K/UL    ABS. BASOPHILS 0.0 0.0 - 0.2 K/UL    ABS. IMM.  GRANS. 0.1 0.0 - 0.5 K/UL   METABOLIC PANEL, BASIC    Collection Time: 03/09/22  5:41 AM   Result Value Ref Range    Sodium 136 136 - 145 mmol/L    Potassium 3.8 3.5 - 5.1 mmol/L    Chloride 109 (H) 98 - 107 mmol/L    CO2 19 (L) 21 - 32 mmol/L    Anion gap 8 7 - 16 mmol/L    Glucose 106 (H) 65 - 100 mg/dL    BUN 21 8 - 23 MG/DL    Creatinine 1.10 (H) 0.6 - 1.0 MG/DL    GFR est AA >60 >60 ml/min/1.73m2    GFR est non-AA 50 (L) >60 ml/min/1.73m2    Calcium 8.6 8.3 - 10.4 MG/DL   GLUCOSE, POC    Collection Time: 03/09/22  7:48 AM   Result Value Ref Range    Glucose (POC) 115 (H) 65 - 100 mg/dL    Performed by Kimball (Merritt)    HGB & HCT    Collection Time: 03/09/22 11:06 AM   Result Value Ref Range    HGB 7.8 (L) 11.7 - 15.4 g/dL    HCT 24.6 (L) 35.8 - 46.3 %   GLUCOSE, POC    Collection Time: 03/09/22 11:22 AM   Result Value Ref Range    Glucose (POC) 151 (H) 65 - 100 mg/dL    Performed by Kimball (Merritt)    TRANSFERRIN SATURATION    Collection Time: 03/09/22  3:20 PM   Result Value Ref Range    Iron 13 (L) 35 - 150 ug/dL    TIBC 151 (L) 250 - 450 ug/dL    Transferrin Saturation 9 (L) >20 %   VITAMIN B12    Collection Time: 03/09/22  3:20 PM   Result Value Ref Range    Vitamin B12 2,317 (H) 193 - 986 pg/mL   FERRITIN    Collection Time: 03/09/22  3:20 PM   Result Value Ref Range    Ferritin 849 (H) 8 - 388 NG/ML   FOLATE    Collection Time: 03/09/22  3:20 PM   Result Value Ref Range    Folate 6.6 3.1 - 17.5 ng/mL   HAPTOGLOBIN    Collection Time: 03/09/22  3:20 PM   Result Value Ref Range    Haptoglobin 418 (H) 30 - 200 mg/dL   LD    Collection Time: 03/09/22  3:20 PM   Result Value Ref Range     110 - 210 U/L   RETICULOCYTE COUNT    Collection Time: 03/09/22  3:20 PM   Result Value Ref Range    Reticulocyte count 2.2 (H) 0.3 - 2.0 %    Absolute Retic Cnt. 0.0632 0.026 - 0.095 M/ul    Immature Retic Fraction 23.2 (H) 3.0 - 15.9 %    Retic Hgb Conc. 28 (L) 29 - 35 pg   GLUCOSE, POC    Collection Time: 03/09/22  4:16 PM   Result Value Ref Range    Glucose (POC) 123 (H) 65 - 100 mg/dL Performed by Kimball (Merritt)    GLUCOSE, POC    Collection Time: 03/09/22 10:00 PM   Result Value Ref Range    Glucose (POC) 133 (H) 65 - 100 mg/dL    Performed by Lalo Mayo    PLEASE READ & DOCUMENT PPD TEST IN 72 HRS    Collection Time: 03/10/22  2:00 AM   Result Value Ref Range    PPD Negative Negative    mm Induration 0 0 - 5 mm   CBC WITH AUTOMATED DIFF    Collection Time: 03/10/22  3:02 AM   Result Value Ref Range    WBC 14.2 (H) 4.3 - 11.1 K/uL    RBC 2.56 (L) 4.05 - 5.2 M/uL    HGB 7.6 (L) 11.7 - 15.4 g/dL    HCT 23.8 (L) 35.8 - 46.3 %    MCV 93.0 79.6 - 97.8 FL    MCH 29.7 26.1 - 32.9 PG    MCHC 31.9 31.4 - 35.0 g/dL    RDW 14.0 11.9 - 14.6 %    PLATELET 160 259 - 196 K/uL    MPV 10.0 9.4 - 12.3 FL    ABSOLUTE NRBC 0.00 0.0 - 0.2 K/uL    DF AUTOMATED      NEUTROPHILS 62 43 - 78 %    LYMPHOCYTES 23 13 - 44 %    MONOCYTES 11 4.0 - 12.0 %    EOSINOPHILS 2 0.5 - 7.8 %    BASOPHILS 0 0.0 - 2.0 %    IMMATURE GRANULOCYTES 1 0.0 - 5.0 %    ABS. NEUTROPHILS 8.9 (H) 1.7 - 8.2 K/UL    ABS. LYMPHOCYTES 3.3 0.5 - 4.6 K/UL    ABS. MONOCYTES 1.6 (H) 0.1 - 1.3 K/UL    ABS. EOSINOPHILS 0.3 0.0 - 0.8 K/UL    ABS. BASOPHILS 0.1 0.0 - 0.2 K/UL    ABS. IMM.  GRANS. 0.2 0.0 - 0.5 K/UL   METABOLIC PANEL, BASIC    Collection Time: 03/10/22  3:02 AM   Result Value Ref Range    Sodium 136 136 - 145 mmol/L    Potassium 3.6 3.5 - 5.1 mmol/L    Chloride 107 98 - 107 mmol/L    CO2 20 (L) 21 - 32 mmol/L    Anion gap 9 7 - 16 mmol/L    Glucose 108 (H) 65 - 100 mg/dL    BUN 24 (H) 8 - 23 MG/DL    Creatinine 1.20 (H) 0.6 - 1.0 MG/DL    GFR est AA 55 (L) >60 ml/min/1.73m2    GFR est non-AA 46 (L) >60 ml/min/1.73m2    Calcium 8.4 8.3 - 10.4 MG/DL   GLUCOSE, POC    Collection Time: 03/10/22  8:00 AM   Result Value Ref Range    Glucose (POC) 105 (H) 65 - 100 mg/dL    Performed by Romero    GLUCOSE, POC    Collection Time: 03/10/22 11:13 AM   Result Value Ref Range    Glucose (POC) 134 (H) 65 - 100 mg/dL    Performed by PlaneWhitneyCNA        All Micro Results     Procedure Component Value Units Date/Time    CULTURE, BLOOD [[id number]] Collected: 03/07/22 0919    Order Status: Completed Specimen: Blood Updated: 03/10/22 0912     Special Requests: --        RIGHT  HAND       Culture result: NO GROWTH 3 DAYS       CULTURE, BLOOD [[id number]] Collected: 03/07/22 0929    Order Status: Completed Specimen: Blood Updated: 03/10/22 0912     Special Requests: --        RIGHT  FOREARM       Culture result: NO GROWTH 3 DAYS       CULTURE, URINE [[id number]]  (Abnormal)  (Susceptibility) Collected: 03/06/22 2235    Order Status: Completed Specimen: Urine from Clean catch Updated: 03/09/22 0822     Special Requests: NO SPECIAL REQUESTS        Culture result:       >100,000 COLONIES/mL ESCHERICHIA COLI                Other Studies:  XR CHEST SNGL V    Result Date: 3/10/2022  History: Leukocytosis and hypotension Exam: portable chest Comparison: 3/7/2022 Findings: The lungs are clear. The mediastinal contour and osseous structures are normal. No pneumothorax. IMPRESSIONs: No acute findings.        Current Meds:  Current Facility-Administered Medications   Medication Dose Route Frequency    0.9% sodium chloride infusion  100 mL/hr IntraVENous CONTINUOUS    [START ON 3/11/2022] vancomycin (VANCOCIN) 1250 mg in  ml infusion  1,250 mg IntraVENous Q24H    [Held by provider] enoxaparin (LOVENOX) injection 40 mg  40 mg SubCUTAneous DAILY    cefTRIAXone (ROCEPHIN) 1 g in 0.9% sodium chloride (MBP/ADV) 50 mL MBP  1 g IntraVENous Q24H    sodium chloride (NS) flush 5-40 mL  5-40 mL IntraVENous Q8H    sodium chloride (NS) flush 5-40 mL  5-40 mL IntraVENous PRN    acetaminophen (TYLENOL) tablet 650 mg  650 mg Oral Q6H PRN    Or    acetaminophen (TYLENOL) suppository 650 mg  650 mg Rectal Q6H PRN    polyethylene glycol (MIRALAX) packet 17 g  17 g Oral DAILY PRN    ondansetron (ZOFRAN ODT) tablet 4 mg  4 mg Oral Q8H PRN    Or    ondansetron [facility]) injection 4 mg  4 mg IntraVENous Q6H PRN    insulin lispro (HUMALOG) injection   SubCUTAneous AC&HS    [Held by provider] amLODIPine (NORVASC) tablet 5 mg  5 mg Oral DAILY    aspirin delayed-release tablet 81 mg  81 mg Oral DAILY    cetirizine (ZYRTEC) tablet 10 mg  10 mg Oral DAILY PRN    dorzolamide-timoloL (COSOPT) 22.3-6.8 mg/mL ophthalmic solution 1 Drop  1 Drop Both Eyes BID    furosemide (LASIX) tablet 20 mg  20 mg Oral DAILY    latanoprost (XALATAN) 0.005 % ophthalmic solution 1 Drop  1 Drop Both Eyes QHS    levothyroxine (SYNTHROID) tablet 50 mcg  50 mcg Oral ACB    [Held by provider] losartan (COZAAR) tablet 100 mg  100 mg Oral DAILY    [Held by provider] metoprolol succinate (TOPROL-XL) XL tablet 100 mg  100 mg Oral DAILY    pantoprazole (PROTONIX) tablet 40 mg  40 mg Oral ACB    pregabalin (LYRICA) capsule 50 mg  50 mg Oral BID    rosuvastatin (CRESTOR) tablet 20 mg  20 mg Oral QHS    HYDROcodone-acetaminophen (NORCO) 7.5-325 mg per tablet 1 Tablet  1 Tablet Oral Q6H PRN       Signed: Yoseph Escamilla DO    Part of this note may have been written by using a voice dictation software. The note has been proof read but may still contain some grammatical/other typographical errors.

## 2022-03-10 NOTE — PROGRESS NOTES
Zulayve to Dr Janette Michel this morning at 8am for order clarification. Asked him to call radiologist for assistance on order at 1213.

## 2022-03-10 NOTE — PROGRESS NOTES
VANCO DAILY FOLLOW UP NOTE  [address] [facility] Pharmacokinetic Monitoring Service - Vancomycin    Consulting Provider: Lisandra   Indication: sepsis  Target Concentration: Goal AUC/KAROL 400-600 mg*hr/L  Day of Therapy: 1  Additional Antimicrobials: ceftriaxone    Pertinent Laboratory Values: Wt Readings from Last 1 Encounters:   03/06/22 90.7 kg (200 lb)     Temp Readings from Last 1 Encounters:   03/10/22 98 °F (36.7 °C)     No components found for: PROCAL  Recent Labs     03/10/22  0302 03/09/22  0541 03/08/22  0728   BUN 24* 21 26*   CREA 1.20* 1.10* 1.10*   WBC 14.2* 13.7* 12.8*     Estimated Creatinine Clearance: 36.8 mL/min (A) (based on SCr of 1.2 mg/dL (H)). No results found for: Mayra Motta        Assessment:  Date/Time Dose Concentration AUC         Note: Serum concentrations collected for AUC dosing may appear elevated if collected in close proximity to the dose administered, this is not necessarily an indication of toxicity    Plan:  Dosing recommendations based on Bayesian software  Start vancomycin 1250mg q 24 hours  Anticipated AUC of 523 and trough concentration of 16.7 at steady state  Renal labs as indicated   Vancomycin concentrations will be ordered as clinically appropriate   Pharmacy will continue to monitor patient and adjust therapy as indicated    Thank you for the consult,  Galdino Naqvi.  Bethany ParksD, BCPS  Clinical Pharmacist

## 2022-03-10 NOTE — PROGRESS NOTES
TRANSFER - OUT REPORT:    Verbal report given to Morenita Nava on Tobias Douglass  being transferred to  for routine progression of care       Report consisted of patients Situation, Background, Assessment and   Recommendations(SBAR). Information from the following report(s) SBAR, Kardex, Intake/Output, MAR and Recent Results was reviewed with the receiving nurse. Lines:   Peripheral IV 03/06/22 Right Antecubital (Active)   Site Assessment Clean, dry, & intact 03/10/22 0348   Phlebitis Assessment 0 03/09/22 2001   Infiltration Assessment 0 03/09/22 2001   Dressing Status Clean, dry, & intact 03/10/22 0348   Dressing Type Transparent;Tape 03/09/22 2001   Hub Color/Line Status Pink;Flushed;Patent 03/09/22 2001   Alcohol Cap Used No 03/09/22 2001       Peripheral IV 03/07/22 Right;Distal Forearm (Active)   Site Assessment Clean, dry, & intact 03/10/22 0900   Phlebitis Assessment 0 03/10/22 0900   Infiltration Assessment 0 03/10/22 0900   Dressing Status Clean, dry, & intact 03/10/22 0900   Dressing Type Transparent;Tape 03/10/22 0900   Hub Color/Line Status Pink;Flushed;Patent 03/10/22 0900   Action Taken Open ports on tubing capped 03/10/22 0900   Alcohol Cap Used No 03/09/22 2001        Opportunity for questions and clarification was provided.

## 2022-03-10 NOTE — PROGRESS NOTES
TRANSFER - IN REPORT:    Verbal report received from Jennifer Nolasco, [facility] on Zeynep Sa  being received from ED overflow for routine progression of care      Report consisted of patients Situation, Background, Assessment and   Recommendations(SBAR). Information from the following report(s) SBAR, Kardex, ED Summary, Intake/Output, MAR and Recent Results was reviewed with the receiving nurse. Opportunity for questions and clarification was provided. Assessment completed upon patients arrival to unit and care assumed.

## 2022-03-11 LAB
ANION GAP SERPL CALC-SCNC: 8 MMOL/L (ref 7–16)
BASOPHILS # BLD: 0 K/UL (ref 0–0.2)
BASOPHILS NFR BLD: 0 % (ref 0–2)
BUN SERPL-MCNC: 22 MG/DL (ref 8–23)
CALCIUM SERPL-MCNC: 8.7 MG/DL (ref 8.3–10.4)
CHLORIDE SERPL-SCNC: 110 MMOL/L (ref 98–107)
CO2 SERPL-SCNC: 20 MMOL/L (ref 21–32)
CREAT SERPL-MCNC: 1.2 MG/DL (ref 0.6–1)
DIFFERENTIAL METHOD BLD: ABNORMAL
EOSINOPHIL # BLD: 0.4 K/UL (ref 0–0.8)
EOSINOPHIL NFR BLD: 3 % (ref 0.5–7.8)
ERYTHROCYTE [DISTWIDTH] IN BLOOD BY AUTOMATED COUNT: 14.2 % (ref 11.9–14.6)
GLUCOSE BLD STRIP.AUTO-MCNC: 131 MG/DL (ref 65–100)
GLUCOSE BLD STRIP.AUTO-MCNC: 140 MG/DL (ref 65–100)
GLUCOSE BLD STRIP.AUTO-MCNC: 141 MG/DL (ref 65–100)
GLUCOSE BLD STRIP.AUTO-MCNC: 158 MG/DL (ref 65–100)
GLUCOSE SERPL-MCNC: 108 MG/DL (ref 65–100)
HCT VFR BLD AUTO: 28.1 % (ref 35.8–46.3)
HGB BLD-MCNC: 8.5 G/DL (ref 11.7–15.4)
IMM GRANULOCYTES # BLD AUTO: 0.1 K/UL (ref 0–0.5)
IMM GRANULOCYTES NFR BLD AUTO: 1 % (ref 0–5)
LYMPHOCYTES # BLD: 2.4 K/UL (ref 0.5–4.6)
LYMPHOCYTES NFR BLD: 19 % (ref 13–44)
MCH RBC QN AUTO: 29.4 PG (ref 26.1–32.9)
MCHC RBC AUTO-ENTMCNC: 30.2 G/DL (ref 31.4–35)
MCV RBC AUTO: 97.2 FL (ref 79.6–97.8)
MONOCYTES # BLD: 1.1 K/UL (ref 0.1–1.3)
MONOCYTES NFR BLD: 9 % (ref 4–12)
NEUTS SEG # BLD: 8.4 K/UL (ref 1.7–8.2)
NEUTS SEG NFR BLD: 68 % (ref 43–78)
NRBC # BLD: 0 K/UL (ref 0–0.2)
PLATELET # BLD AUTO: 293 K/UL (ref 150–450)
PMV BLD AUTO: 9.8 FL (ref 9.4–12.3)
POTASSIUM SERPL-SCNC: 3.8 MMOL/L (ref 3.5–5.1)
RBC # BLD AUTO: 2.89 M/UL (ref 4.05–5.2)
SERVICE CMNT-IMP: ABNORMAL
SODIUM SERPL-SCNC: 138 MMOL/L (ref 136–145)
WBC # BLD AUTO: 12.4 K/UL (ref 4.3–11.1)

## 2022-03-11 PROCEDURE — 85025 COMPLETE CBC W/AUTO DIFF WBC: CPT

## 2022-03-11 PROCEDURE — 74011636637 HC RX REV CODE- 636/637: Performed by: INTERNAL MEDICINE

## 2022-03-11 PROCEDURE — 74011250636 HC RX REV CODE- 250/636: Performed by: INTERNAL MEDICINE

## 2022-03-11 PROCEDURE — 2709999900 HC NON-CHARGEABLE SUPPLY

## 2022-03-11 PROCEDURE — 74011250637 HC RX REV CODE- 250/637: Performed by: INTERNAL MEDICINE

## 2022-03-11 PROCEDURE — 36415 COLL VENOUS BLD VENIPUNCTURE: CPT

## 2022-03-11 PROCEDURE — 82962 GLUCOSE BLOOD TEST: CPT

## 2022-03-11 PROCEDURE — 80048 BASIC METABOLIC PNL TOTAL CA: CPT

## 2022-03-11 PROCEDURE — 74011250637 HC RX REV CODE- 250/637: Performed by: HOSPITALIST

## 2022-03-11 PROCEDURE — 65270000029 HC RM PRIVATE

## 2022-03-11 PROCEDURE — 97530 THERAPEUTIC ACTIVITIES: CPT

## 2022-03-11 PROCEDURE — 97535 SELF CARE MNGMENT TRAINING: CPT

## 2022-03-11 PROCEDURE — 97112 NEUROMUSCULAR REEDUCATION: CPT

## 2022-03-11 RX ADMIN — PREGABALIN 50 MG: 50 CAPSULE ORAL at 21:02

## 2022-03-11 RX ADMIN — PANTOPRAZOLE SODIUM 40 MG: 40 TABLET, DELAYED RELEASE ORAL at 05:24

## 2022-03-11 RX ADMIN — INSULIN LISPRO 2 UNITS: 100 INJECTION, SOLUTION INTRAVENOUS; SUBCUTANEOUS at 12:24

## 2022-03-11 RX ADMIN — LEVOTHYROXINE SODIUM 50 MCG: 0.05 TABLET ORAL at 05:24

## 2022-03-11 RX ADMIN — SODIUM CHLORIDE 100 ML/HR: 900 INJECTION, SOLUTION INTRAVENOUS at 04:23

## 2022-03-11 RX ADMIN — SODIUM CHLORIDE 100 ML/HR: 900 INJECTION, SOLUTION INTRAVENOUS at 15:20

## 2022-03-11 RX ADMIN — ASPIRIN 81 MG: 81 TABLET ORAL at 08:33

## 2022-03-11 RX ADMIN — HYDROCODONE BITARTRATE AND ACETAMINOPHEN 1 TABLET: 7.5; 325 TABLET ORAL at 08:33

## 2022-03-11 RX ADMIN — PREGABALIN 50 MG: 50 CAPSULE ORAL at 08:33

## 2022-03-11 RX ADMIN — ROSUVASTATIN CALCIUM 20 MG: 20 TABLET, FILM COATED ORAL at 21:02

## 2022-03-11 RX ADMIN — HYDROCODONE BITARTRATE AND ACETAMINOPHEN 1 TABLET: 7.5; 325 TABLET ORAL at 21:02

## 2022-03-11 RX ADMIN — NYSTATIN: 100000 POWDER TOPICAL at 17:15

## 2022-03-11 RX ADMIN — DORZOLAMIDE HYDROCHLORIDE AND TIMOLOL MALEATE 1 DROP: 20; 5 SOLUTION/ DROPS OPHTHALMIC at 17:16

## 2022-03-11 RX ADMIN — ONDANSETRON 4 MG: 4 TABLET, ORALLY DISINTEGRATING ORAL at 13:53

## 2022-03-11 RX ADMIN — VANCOMYCIN HYDROCHLORIDE 1250 MG: 10 INJECTION, POWDER, LYOPHILIZED, FOR SOLUTION INTRAVENOUS at 10:35

## 2022-03-11 RX ADMIN — LATANOPROST 1 DROP: 50 SOLUTION/ DROPS OPHTHALMIC at 21:05

## 2022-03-11 RX ADMIN — NYSTATIN: 100000 POWDER TOPICAL at 08:36

## 2022-03-11 RX ADMIN — DORZOLAMIDE HYDROCHLORIDE AND TIMOLOL MALEATE 1 DROP: 20; 5 SOLUTION/ DROPS OPHTHALMIC at 08:34

## 2022-03-11 NOTE — PROGRESS NOTES
ACUTE PHYSICAL THERAPY GOALS:  (Developed with and agreed upon by patient and/or caregiver. )  LTG:  (1.)Ms. Sonia Adame will move from supine to sit and sit to supine , scoot up and down and roll side to side in bed with STAND BY ASSIST within 7 treatment day(s). (2.)Ms. Sonia Adame will transfer from bed to chair and chair to bed with CONTACT GUARD ASSIST using the least restrictive device within 7 treatment day(s). (3.)Ms. Sonia Adame will ambulate with CONTACT GUARD ASSIST for 150 feet with the least restrictive device within 7 treatment day(s). (4.)Ms. Sonia Adame will perform exercises per HEP for 15+ minutes to improve strength and mobility within 7 days. PHYSICAL THERAPY: Daily Note and AM Treatment Day # 5    Mary Jo Adame is a 78 y.o. female   PRIMARY DIAGNOSIS: Humerus fracture  Fall [W19. XXXA]  Humerus fracture [S42.309A]  UTI (urinary tract infection) [N39.0]         ASSESSMENT:     REHAB RECOMMENDATIONS: CURRENT LEVEL OF FUNCTION:  (Most Recently Demonstrated)   Recommendation to date pending progress:  Setting:   Short-term Rehab  Equipment:    To Be Determined Bed Mobility:   Minimal Assistance x 2  Sit to Stand:   Moderate Assistance x 2    Transfers:   Minimal Assistance x 2  Gait/Mobility:   x 2 min-mod      ASSESSMENT:  Ms. Sonia Adame was supine in bed on arrival. She is agreeable to PT and would like to get up in chair. She required Delia of 2 to sit at EOB. STS with modA of 2. She was able to ambulate 10 ft x 2 with min-mod HHA of 2. Decreased gait speed, short shuffled steps, and unsteadiness noted. Pt gets SOB with mobility and fatigues quickly but SpO2 >95%. Transfer to chair with Delia of 2. Some progress, will continue to follow. SUBJECTIVE:   Ms. Sonia Adame states, \"thank ya'll so much\"    SOCIAL HISTORY/ LIVING ENVIRONMENT: Lives alone. Mod I with rolling walker at baseline. Niece/other family members come to assist frequently.    Home Environment: Private residence  One/Two Story Residence: One story  Living Alone: Yes  Support Systems: Child(johanny)  OBJECTIVE:     PAIN: VITAL SIGNS: LINES/DRAINS:   Pre Treatment: Pain Screen  Pain Scale 1: Numeric (0 - 10)  Pain Intensity 1: 7  Pain Location 1: Arm  Pain Orientation 1: Left  Pain Intervention(s) 1: Ambulation/Increased Activity   Post Treatment: 7   IV and Purewick  O2 Device: None (Room air)     MOBILITY: I Mod I S SBA CGA Min Mod Max Total  NT x2 Comments:   Bed Mobility    Rolling [] [] [] [] [] [x] [] [] [] [] [x]    Supine to Sit [] [] [] [] [] [x] [] [] [] [] [x]    Scooting [] [] [] [] [] [x] [] [] [] [] [x]    Sit to Supine [] [] [] [] [] [] [] [] [] [x] []    Transfers    Sit to Stand [] [] [] [] [] [] [x] [] [] [] [x]    Bed to Chair [] [] [] [] [] [] [x] [] [] [] [x]    Stand to Sit [] [] [] [] [] [] [x] [] [] [] [x]    I=Independent, Mod I=Modified Independent, S=Supervision, SBA=Standby Assistance, CGA=Contact Guard Assistance,   Min=Minimal Assistance, Mod=Moderate Assistance, Max=Maximal Assistance, Total=Total Assistance, NT=Not Tested    BALANCE: Good Fair+ Fair Fair- Poor NT Comments   Sitting Static [] [x] [] [] [] []    Sitting Dynamic [] [x] [] [] [] []              Standing Static [] [] [] [x] [] []    Standing Dynamic [] [] [] [x] [] []      GAIT: I Mod I S SBA CGA Min Mod Max Total  NT x2 Comments:   Level of Assistance [] [] [] [] [] [x] [x] [] [] [] [x]    Distance 10 ft x 2    DME handheld assist    Gait Quality Unsteady, weak, fatigues quickly, short, shuffled steps    Weightbearing  Status NWB L UE in sling    I=Independent, Mod I=Modified Independent, S=Supervision, SBA=Standby Assistance, CGA=Contact Guard Assistance,   Min=Minimal Assistance, Mod=Moderate Assistance, Max=Maximal Assistance, Total=Total Assistance, NT=Not Tested    PLAN:   FREQUENCY/DURATION: PT Plan of Care: 3 times/week for duration of hospital stay or until stated goals are met, whichever comes first.  TREATMENT:     TREATMENT:   ($$ Therapeutic Activity: 38-52 mins    )  Co-Treatment PT/OT necessary due to patient's decreased overall endurance/tolerance levels, as well as need for high level skilled assistance to complete functional transfers/mobility and functional tasks  Therapeutic Activity (44 Minutes): Therapeutic activity included Rolling, Supine to Sit, Scooting, Transfer Training, Ambulation on level ground, Sitting balance  and Standing balance to improve functional Mobility, Strength and Activity tolerance.     TREATMENT GRID:   Date:  3/8/22 Date:   Date:     Activity/Exercise Parameters Parameters Parameters   LAQ 10x     Seated marching 10x     Seated hip abduction 10x     Ankle pumps 10x                         AFTER TREATMENT POSITION/PRECAUTIONS:  Chair, Needs within reach and RN notified    INTERDISCIPLINARY COLLABORATION:  RN/PCT and OT/ZAIDI    TOTAL TREATMENT DURATION:  PT Patient Time In/Time Out  Time In: 1115  Time Out: 216 Jenelle Augustin PT

## 2022-03-11 NOTE — PROGRESS NOTES
END OF SHIFT NOTE:    INTAKE/OUTPUT  03/10 0701 - 03/11 0700  In: 380.2 [P.O.:240; I.V.:140.2]  Out: 1450 [Urine:1450]  Voiding: YES  Catheter: YES purwick  Drain:   External Urinary Catheter 03/07/22 (Active)   Site Assessment Clean, dry, & intact 03/11/22 0230   Repositioned No 03/11/22 0230   Perineal Care No 03/11/22 0230   Wick Changed No 03/11/22 0230   Suction Canister/Tubing Changed No 03/11/22 0230   Urine Output (mL) 750 ml 03/11/22 0230               Flatus: Patient does have flatus present. Stool:  0 occurrences. Characteristics:       Emesis: 0 occurrences. Characteristics:        VITAL SIGNS  Patient Vitals for the past 12 hrs:   Temp Pulse Resp BP SpO2   03/11/22 0311 97.9 °F (36.6 °C) 71 18 136/66 94 %   03/10/22 2235 97.7 °F (36.5 °C) 69 18 123/67 98 %   03/10/22 1918 97.5 °F (36.4 °C) 79 18 129/66 99 %   03/10/22 1834 98.1 °F (36.7 °C) 76 18 (!) 149/110 96 %       Pain Assessment  Pain Intensity 1: 0 (03/10/22 1940)  Pain Location 1: Arm  Pain Intervention(s) 1: Medication (see MAR)  Patient Stated Pain Goal: 0    Ambulating  No    Shift report given to oncoming nurse at the bedside.     Eleno August RN

## 2022-03-11 NOTE — PROGRESS NOTES
Problem: Pressure Injury - Risk of  Goal: *Prevention of pressure injury  Description: Document Dustin Scale and appropriate interventions in the flowsheet. Outcome: Progressing Towards Goal  Note: Pressure Injury Interventions:  Sensory Interventions: Assess changes in LOC    Moisture Interventions: Absorbent underpads,Internal/External urinary devices    Activity Interventions: Pressure redistribution bed/mattress(bed type),Increase time out of bed,PT/OT evaluation    Mobility Interventions: Pressure redistribution bed/mattress (bed type),PT/OT evaluation    Nutrition Interventions: Document food/fluid/supplement intake,Offer support with meals,snacks and hydration    Friction and Shear Interventions: Lift sheet                Problem: Patient Education: Go to Patient Education Activity  Goal: Patient/Family Education  Outcome: Progressing Towards Goal     Problem: Falls - Risk of  Goal: *Absence of Falls  Description: Document Dominik Fall Risk and appropriate interventions in the flowsheet.   Outcome: Progressing Towards Goal  Note: Fall Risk Interventions:  Mobility Interventions: Communicate number of staff needed for ambulation/transfer,Patient to call before getting OOB         Medication Interventions: Patient to call before getting OOB,Teach patient to arise slowly    Elimination Interventions: Call light in reach,Patient to call for help with toileting needs    History of Falls Interventions: Consult care management for discharge planning         Problem: Patient Education: Go to Patient Education Activity  Goal: Patient/Family Education  Outcome: Progressing Towards Goal

## 2022-03-11 NOTE — PROGRESS NOTES
ACUTE OT GOALS:  (Developed with and agreed upon by patient and/or caregiver.)  1. Patient will complete lower body bathing and dressing with MIN A and adaptive equipment as needed. 2.Patient will complete upper body bathing and dressing with MIN A and adaptive equipment as needed. 3. Patient will complete toileting with MIN A. GOAL MET 3/9/22  4. Patient will tolerate 25 minutes of OT treatment with 1-2 rest breaks to increase activity tolerance for ADLs. GOAL MET 3/9/22  5. Patient will complete functional transfers with SBA and adaptive equipment as needed. 6. Patient will complete functional activity with SUPERVISION and adaptive equipment as needed.     Timeframe: 7 visits     OCCUPATIONAL THERAPY: Daily Note OT Treatment Day # 3    Pili Wilkinson is a 78 y.o. female   PRIMARY DIAGNOSIS: Humerus fracture  Fall [W19. XXXA]  Humerus fracture [S42.309A]  UTI (urinary tract infection) [N39.0]       [place] / [place] / Product Type: Medicare /   ASSESSMENT:     REHAB RECOMMENDATIONS: CURRENT LEVEL OF FUNCTION:  (Most Recently Demonstrated)   Recommendation to date pending progress:  Setting:   Short-term Rehab  Equipment:    To Be Determined Bathing:   Moderate Assistance  Dressing:   Maximal Assistance  Feeding/Grooming:   Minimal Assistance  Toileting:   Total Assistance for bladder hygiene  Functional Mobility:   Minimal Assistance x 2/Moderate A x2     ASSESSMENT:  Ms. Luisa Wilkinson is doing fair today. Pt presents supine upon arrival. Pt was able to perform bed mobility with min A x2. Pt demonstrates fair sitting balance at EOB. Pt was able to stand and perform functional mobility around the room with min/mod A x2. Some fatigue noticed with mobility. Pt returned to recliner and was assisted with all self care tasks today. Pt required mod A to complete bathing and dressing due to UE fx. Pt tolerated fair. Pt left in chair with sling on. All needs left in reach.  Good session for patient. Making some progress with goals. Will continue to benefit from skilled OT during stay.      SUBJECTIVE:   Ms. Kirk Grant states, \"It hurts when you let it go\"    SOCIAL HISTORY/LIVING ENVIRONMENT: lives alone in a one level home, 2 steps to enter, uses RW in home and [facility] outside home, tub shower with transfer bench, independent for ADLs and household IADLs, assist from community-level IADLs  Home Environment: Private residence  One/Two Story Residence: One story  Living Alone: Yes  Support Systems: Child(johanny)    OBJECTIVE:     PAIN: VITAL SIGNS: LINES/DRAINS:   Pre Treatment: Pain Screen  Pain Scale 1: Numeric (0 - 10)  Pain Intensity 1: 0  Post Treatment: 0   IV and Purewick  O2 Device: None (Room air)     ACTIVITIES OF DAILY LIVING: I Mod I S SBA CGA Min Mod Max Total NT Comments   BASIC ADLs:              Bathing/ Showering [] [] [] [] [] [] [x] [] [] [x]    Toileting [] [] [] [] [] [] [] [] [x] [] Bladder hygiene   Dressing [] [] [] [] [] [] [] [x] [] []    Feeding [] [] [] [] [] [] [] [] [] [x]    Grooming [] [] [] [] [] [x] [] [] [] [] Washed hair   Personal Device Care [] [] [] [] [] [] [] [] [] [x]    Functional Mobility [] [] [] [] [] [x] [x] [] [] [] x2   I=Independent, Mod I=Modified Independent, S=Supervision, SBA=Standby Assistance, CGA=Contact Guard Assistance,   Min=Minimal Assistance, Mod=Moderate Assistance, Max=Maximal Assistance, Total=Total Assistance, NT=Not Tested    MOBILITY: I Mod I S SBA CGA Min Mod Max Total  NT x2 Comments:   Supine to sit [] [] [] [] [] [x] [x] [] [] [] [x]    Sit to supine [] [] [] [] [] [] [] [] [] [x] []    Sit to stand [] [] [] [] [] [x] [x] [] [] [] [x] HHA   Bed to chair [] [] [] [] [] [x] [x] [] [] [] [x] HHA   I=Independent, Mod I=Modified Independent, S=Supervision, SBA=Standby Assistance, CGA=Contact Guard Assistance,   Min=Minimal Assistance, Mod=Moderate Assistance, Max=Maximal Assistance, Total=Total Assistance, NT=Not Tested    BALANCE: Good Fair+ Fair Fair- Poor NT Comments   Sitting Static [] [x] [] [] [] [] On toilet   Sitting Dynamic [] [x] [] [] [] [] Molly care sitting on commode             Standing Static [] [] [x] [] [] [] Standing EOB with HHA   Standing Dynamic [] [] [x] [] [] [] Ambulating min A x2 HHA     PLAN:   FREQUENCY/DURATION: OT Plan of Care: 3 times/week for duration of hospital stay or until stated goals are met, whichever comes first.    TREATMENT:   TREATMENT:   ($$ Self Care/Home Management: 38-52 mins$$ Neuromuscular Re-Education: 23-37 mins   )  Co-Treatment PT/OT necessary due to patient's decreased overall endurance/tolerance levels, as well as need for high level skilled assistance to complete functional transfers/mobility and functional tasks  Self Care (40 Minutes): Self care including Upper Body Bathing, Lower Body Bathing, Toileting, Upper Body Dressing, Lower Body Dressing and Grooming to increase independence and decrease level of assistance required. Neuromuscular Re-education (30 Minutes): Neuromuscular Re-education included Balance Training, Coordination training, Postural training, Sitting balance training and Standing balance training to improve Balance, Coordination, Functional Mobility and Postural Control.     TREATMENT GRID:  N/A    AFTER TREATMENT POSITION/PRECAUTIONS:  Chair, Needs within reach and RN notified    INTERDISCIPLINARY COLLABORATION:  RN/PCT, PT/PTA and OT/ZAIDI    TOTAL TREATMENT DURATION:  OT Patient Time In/Time Out  Time In: 1115  Time Out: 713 [facility]

## 2022-03-11 NOTE — PROGRESS NOTES
Physician Progress Note      PATIENT:               Srinivasa Haddad  CSN #:                  [id number]  :                       1940  ADMIT DATE:       3/6/2022 8:22 PM  [address] [place] DATE:  RESPONDING  PROVIDER #:        Toribio Aparicio MD          QUERY TEXT:    Pt admitted with UTI. Pt noted to have elevated WBC since admission and increase in temperature on . If possible, please document in the progress notes and discharge summary if you are evaluating and /or treating any of the following: The medical record reflects the following:  Risk Factors: UTI  Clinical Indicators: Urine culture with ecoli. WBC 19.1 on admission then as follows, 12.8, 13.7, 14.2, 12.4. On   at 2100 documented temperature of  101.4. Documentation of questionable sepsis on progress note on March 10. Treatment: Antibiotics  Options provided:  -- Sepsis, present on admission  -- Sepsis, not present on admission  -- UTI without Sepsis  -- Other - I will add my own diagnosis  -- Disagree - Not applicable / Not valid  -- Disagree - Clinically unable to determine / Unknown  -- Refer to Clinical Documentation Reviewer    PROVIDER RESPONSE TEXT:    This patient has sepsis which was present on admission.     Query created by: Michelle Euceda on 3/11/2022 11:12 AM      Electronically signed by:  Toribio Aparicio MD 3/11/2022 3:59 PM

## 2022-03-11 NOTE — PROGRESS NOTES
Problem: Pressure Injury - Risk of  Goal: *Prevention of pressure injury  Description: Document Dustin Scale and appropriate interventions in the flowsheet. Outcome: Progressing Towards Goal  Note: Pressure Injury Interventions:  Sensory Interventions: Assess changes in LOC,Check visual cues for pain,Keep linens dry and wrinkle-free,Minimize linen layers,Pressure redistribution bed/mattress (bed type)    Moisture Interventions: Absorbent underpads,Apply protective barrier, creams and emollients,Internal/External urinary devices,Minimize layers    Activity Interventions: Increase time out of bed,Pressure redistribution bed/mattress(bed type)    Mobility Interventions: HOB 30 degrees or less,Pressure redistribution bed/mattress (bed type),Turn and reposition approx.  every two hours(pillow and wedges)    Nutrition Interventions: Document food/fluid/supplement intake,Offer support with meals,snacks and hydration    Friction and Shear Interventions: Foam dressings/transparent film/skin sealants,Apply protective barrier, creams and emollients

## 2022-03-11 NOTE — PROGRESS NOTES
Hospitalist Progress Note   Admit Date:  3/6/2022  8:22 PM   Name:  Bettie Kaba   Age:  78 y.o. Sex:  female  :  1940   MRN:  [id number]   Room:  [facility]/    Presenting Complaint: Fall and Head Injury    Reason(s) for Admission: Fall [W19. XXXA]  Humerus fracture [S42.309A]  UTI (urinary tract infection) [N39.0]     Hospital Course & Interval History:     Bettie Kaba is a 78 y.o. female with a past medical history significant for hypertension, hypothyroidism and diabetes. The patient lives at home alone but has family members checking in on her. She walks with a walker she had a fall on Thursday. She denies feeling lightheaded dizzy or having any chest pain she states she has shortness of breath at baseline but it was not particularly worse at that time.     She stayed on the ground for about 2-1/2 to 3 hours until family member found her because she injured her left arm and was not able to transfer 90 alert bracelet properly. The family member called the medic alert people and they came and helped the patient from the ground to chair. They did not think that her arm was broken as she had the use of her hand and fingers. However over the last several days she is not being able to walk with her walker and therefore family members were not able to care for her. She was brought to the hospital today with them requesting that she be placed for rehab.     In the ER she was found to have a left proximal humeral fracture, and leukocytosis and a UTI.     Hospitalist service requested for further inpatient management. Subjective/24hr Events (22): Patient examined at bedside. Feels little better. Pain in left shoulder under control. Has not noticed any bleeding. No fevers/chills, chest pain, shortness of breath/cough, abdominal pain, nausea/vomiting or diarrhea. ROS:  10 systems reviewed and negative except as noted above.      Assessment & Plan:     Principal Problem: Humerus fracture (3/6/2022)  - nonsurgical management, per ortho  - PT/OT  - supportive management    # DOUG on CKD  - improving overall  - restarted Lasix and losartan  - avoid nephrotoxic meds, NSAIDs, IV contrast  - strict I's/O's  - daily BMPs    # Acute normocytic anemia  - ?no overt signs of bleeding  - anemia studies (iron studies, B12/folate, haptoglobin/LDH, reticulocyte count, occult blood) have been unrevealing  - hold Lovenox  - avoid NSAIDs  - transfuse for Hgb<7 and/or brisk bleeding  - monitor     # ? Sepsis  - hold antihypertensives  - add vancomycin with pharmacy to dose  - repeat CXR  - CTX empirically (day 4)  - follow cultures, growing GNR>100 pansensitive E. coli      Diabetic polyneuropathy ([facility] (2/1/2017)  - home Lyrica      Type 2 diabetes mellitus ([facility] (4/5/2017)  - hold home meds  - SSI and serial CBGs      Hypoactive thyroid (4/5/2017)   - Synthroid       Class 2 severe obesity due to excess calories with serious comorbidity in adult [facility]) (0/6/9643)  - complicates course of hospitalization      Essential hypertension (5/4/2017)   - hold antihypertensives due to concern for sepsis concern as above      Coronary artery disease involving native coronary artery of native heart without angina pectoris (5/31/2017)   - ASA and statin      Adult failure to thrive (3/7/2022)   - due to generalized weakness and above    F/E/N: stop fluids, replete electrolytes as needed, regular diet    Ppx: Lovenox for VTE    Code Status: FULL CODE    Disposition: pending clinical improvement with plan as above, needs STR at discharge. PT/OT consults and PPD ordered. Discussed with patient at bedside. All questions answered.      Hospital Problems as of 3/11/2022 Date Reviewed: 12/22/2021          Codes Class Noted - Resolved POA    Sepsis ([facility] ICD-10-CM: A41.9  ICD-9-CM: 038.9, 995.91  3/10/2022 - Present Unknown        Adult failure to thrive ICD-10-CM: R62.7  ICD-9-CM: 783.7  3/7/2022 - Present Unknown Acute kidney injury superimposed on chronic kidney disease (HCC) ICD-10-CM: N17.9, N18.9  ICD-9-CM: 866.00, 585.9  3/7/2022 - Present Unknown        UTI (urinary tract infection) ICD-10-CM: N39.0  ICD-9-CM: 599.0  3/6/2022 - Present Unknown        * (Principal) Humerus fracture ICD-10-CM: S42.309A  ICD-9-CM: 812.20  3/6/2022 - Present Unknown        Fall ICD-10-CM: W19. Rafia Hagan  ICD-9-CM: E888.9  3/6/2022 - Present Unknown        Coronary artery disease involving native coronary artery of native heart without angina pectoris ICD-10-CM: I25.10  ICD-9-CM: 414.01  5/31/2017 - Present Yes        Essential hypertension ICD-10-CM: I10  ICD-9-CM: 401.9  5/4/2017 - Present Yes        Type 2 diabetes mellitus ([facility] ICD-10-CM: E11.9  ICD-9-CM: 250.00  4/5/2017 - Present Yes    Overview Signed 4/5/2017  2:43 PM by Lisa TEAUGE     Last Assessment & Plan:   Check hemoglobin A1c. SSI with FSBG checks qAC and qhs. Adjust insulin as needed based on FSBG results. Diabetic diet and education as needed. Hypoactive thyroid ICD-10-CM: E03.9  ICD-9-CM: 244.9  4/5/2017 - Present Yes    Overview Signed 4/5/2017  2:43 PM by Lisa TEAGUE     Last Assessment & Plan:   Continue home thyroid replacement meds. Check TSH in the AM and consider med adjustments based on results. Class 2 severe obesity due to excess calories with serious comorbidity in adult [facility]) ICD-10-CM: E66.01  ICD-9-CM: 278.01  4/5/2017 - Present Yes        Diabetic polyneuropathy ([facility] ICD-10-CM: E11.42  ICD-9-CM: 250.60, 357.2  2/1/2017 - Present Yes    Overview Signed 4/5/2017  2:43 PM by Lisa TEAGUE     Last Assessment & Plan:   The patient's clinical history and presentation is more consistent with a diabetic neuropathy that is new onset in this patient with long-standing diabetes mellitus type 2 and diabetic nephropathy. I will start her on Lyrica therapy starting today. Physical therapy to evaluate for mobility on this drug.   We will continue to follow closely. No clinical signs or symptoms of gout or cellulitis. Chronic kidney disease ICD-10-CM: N18.9  ICD-9-CM: 585.9  2/18/2016 - Present Yes    Overview Signed 4/5/2017  2:43 PM by Riley TEAGUE     Last Assessment & Plan: This is likely from diabetic nephropathy and long-standing diabetes mellitus type 2  Continue current care and give IV fluids overnight. Repeat BMP in the morning. Objective:     Patient Vitals for the past 24 hrs:   Temp Pulse Resp BP SpO2   03/11/22 1159 97.4 °F (36.3 °C) 81 -- (!) 108/59 96 %   03/11/22 0703 97.4 °F (36.3 °C) 84 19 (!) 143/60 96 %   03/11/22 0311 97.9 °F (36.6 °C) 71 18 136/66 94 %   03/10/22 2235 97.7 °F (36.5 °C) 69 18 123/67 98 %   03/10/22 1918 97.5 °F (36.4 °C) 79 18 129/66 99 %   03/10/22 1834 98.1 °F (36.7 °C) 76 18 (!) 149/110 96 %   03/10/22 1507 98 °F (36.7 °C) 73 18 (!) 129/55 99 %     Oxygen Therapy  O2 Sat (%): 96 % (03/11/22 1159)  Pulse via Oximetry: 67 beats per minute (03/10/22 0348)  O2 Device: None (Room air) (03/10/22 1940)    Estimated body mass index is 37.18 kg/m² as calculated from the following:    Height as of this encounter: 5' 1.5\" (1.562 m). Weight as of this encounter: 90.7 kg (200 lb). Intake/Output Summary (Last 24 hours) at 3/11/2022 1303  Last data filed at 3/11/2022 0910  Gross per 24 hour   Intake 1270.29 ml   Output 2050 ml   Net -779.71 ml         Physical Exam:     Blood pressure (!) 108/59, pulse 81, temperature 97.4 °F (36.3 °C), resp. rate 19, height 5' 1.5\" (1.562 m), weight 90.7 kg (200 lb), SpO2 96 %. General:    Well nourished. No overt distress  Head:  Normocephalic, atraumatic  Eyes:  Sclerae appear normal.  Pupils equally round. ENT:  Nares appear normal, no drainage. Moist oral mucosa  Neck:  No restricted ROM. Trachea midline   CV:   RRR. No jugular venous distension. Lungs:   CTAB. No wheezing, rhonchi, or rales.   Respirations even, unlabored  Abdomen: Bowel sounds present. Soft, nontender, nondistended. Extremities: No cyanosis or clubbing. No edema  Skin:     No rashes and normal coloration. Warm and dry. Neuro:  CN II-XII grossly intact. Sensation intact. A&Ox3  Psych:  Normal mood and affect.       I have reviewed ordered lab tests and independently visualized imaging below:    Recent Labs:  Recent Results (from the past 48 hour(s))   TRANSFERRIN SATURATION    Collection Time: 03/09/22  3:20 PM   Result Value Ref Range    Iron 13 (L) 35 - 150 ug/dL    TIBC 151 (L) 250 - 450 ug/dL    Transferrin Saturation 9 (L) >20 %   VITAMIN B12    Collection Time: 03/09/22  3:20 PM   Result Value Ref Range    Vitamin B12 2,317 (H) 193 - 986 pg/mL   FERRITIN    Collection Time: 03/09/22  3:20 PM   Result Value Ref Range    Ferritin 849 (H) 8 - 388 NG/ML   FOLATE    Collection Time: 03/09/22  3:20 PM   Result Value Ref Range    Folate 6.6 3.1 - 17.5 ng/mL   HAPTOGLOBIN    Collection Time: 03/09/22  3:20 PM   Result Value Ref Range    Haptoglobin 418 (H) 30 - 200 mg/dL   LD    Collection Time: 03/09/22  3:20 PM   Result Value Ref Range     110 - 210 U/L   RETICULOCYTE COUNT    Collection Time: 03/09/22  3:20 PM   Result Value Ref Range    Reticulocyte count 2.2 (H) 0.3 - 2.0 %    Absolute Retic Cnt. 0.0632 0.026 - 0.095 M/ul    Immature Retic Fraction 23.2 (H) 3.0 - 15.9 %    Retic Hgb Conc. 28 (L) 29 - 35 pg   GLUCOSE, POC    Collection Time: 03/09/22  4:16 PM   Result Value Ref Range    Glucose (POC) 123 (H) 65 - 100 mg/dL    Performed by Tito)ElithRBRITTANEY    GLUCOSE, POC    Collection Time: 03/09/22 10:00 PM   Result Value Ref Range    Glucose (POC) 133 (H) 65 - 100 mg/dL    Performed by Mateo Childs    PLEASE READ & DOCUMENT PPD TEST IN 72 HRS    Collection Time: 03/10/22  2:00 AM   Result Value Ref Range    PPD Negative Negative    mm Induration 0 0 - 5 mm   CBC WITH AUTOMATED DIFF    Collection Time: 03/10/22  3:02 AM Result Value Ref Range    WBC 14.2 (H) 4.3 - 11.1 K/uL    RBC 2.56 (L) 4.05 - 5.2 M/uL    HGB 7.6 (L) 11.7 - 15.4 g/dL    HCT 23.8 (L) 35.8 - 46.3 %    MCV 93.0 79.6 - 97.8 FL    MCH 29.7 26.1 - 32.9 PG    MCHC 31.9 31.4 - 35.0 g/dL    RDW 14.0 11.9 - 14.6 %    PLATELET 160 863 - 542 K/uL    MPV 10.0 9.4 - 12.3 FL    ABSOLUTE NRBC 0.00 0.0 - 0.2 K/uL    DF AUTOMATED      NEUTROPHILS 62 43 - 78 %    LYMPHOCYTES 23 13 - 44 %    MONOCYTES 11 4.0 - 12.0 %    EOSINOPHILS 2 0.5 - 7.8 %    BASOPHILS 0 0.0 - 2.0 %    IMMATURE GRANULOCYTES 1 0.0 - 5.0 %    ABS. NEUTROPHILS 8.9 (H) 1.7 - 8.2 K/UL    ABS. LYMPHOCYTES 3.3 0.5 - 4.6 K/UL    ABS. MONOCYTES 1.6 (H) 0.1 - 1.3 K/UL    ABS. EOSINOPHILS 0.3 0.0 - 0.8 K/UL    ABS. BASOPHILS 0.1 0.0 - 0.2 K/UL    ABS. IMM.  GRANS. 0.2 0.0 - 0.5 K/UL   METABOLIC PANEL, BASIC    Collection Time: 03/10/22  3:02 AM   Result Value Ref Range    Sodium 136 136 - 145 mmol/L    Potassium 3.6 3.5 - 5.1 mmol/L    Chloride 107 98 - 107 mmol/L    CO2 20 (L) 21 - 32 mmol/L    Anion gap 9 7 - 16 mmol/L    Glucose 108 (H) 65 - 100 mg/dL    BUN 24 (H) 8 - 23 MG/DL    Creatinine 1.20 (H) 0.6 - 1.0 MG/DL    GFR est AA 55 (L) >60 ml/min/1.73m2    GFR est non-AA 46 (L) >60 ml/min/1.73m2    Calcium 8.4 8.3 - 10.4 MG/DL   GLUCOSE, POC    Collection Time: 03/10/22  8:00 AM   Result Value Ref Range    Glucose (POC) 105 (H) 65 - 100 mg/dL    Performed by [employer]    GLUCOSE, POC    Collection Time: 03/10/22 11:13 AM   Result Value Ref Range    Glucose (POC) 134 (H) 65 - 100 mg/dL    Performed by [employer]    GLUCOSE, POC    Collection Time: 03/10/22  4:26 PM   Result Value Ref Range    Glucose (POC) 117 (H) 65 - 100 mg/dL    Performed by [employer]    GLUCOSE, POC    Collection Time: 03/10/22  8:40 PM   Result Value Ref Range    Glucose (POC) 111 (H) 65 - 100 mg/dL    Performed by [employer]    CBC WITH AUTOMATED DIFF    Collection Time: 03/11/22  4:43 AM   Result Value Ref Range WBC 12.4 (H) 4.3 - 11.1 K/uL    RBC 2.89 (L) 4.05 - 5.2 M/uL    HGB 8.5 (L) 11.7 - 15.4 g/dL    HCT 28.1 (L) 35.8 - 46.3 %    MCV 97.2 79.6 - 97.8 FL    MCH 29.4 26.1 - 32.9 PG    MCHC 30.2 (L) 31.4 - 35.0 g/dL    RDW 14.2 11.9 - 14.6 %    PLATELET 092 623 - 350 K/uL    MPV 9.8 9.4 - 12.3 FL    ABSOLUTE NRBC 0.00 0.0 - 0.2 K/uL    DF AUTOMATED      NEUTROPHILS 68 43 - 78 %    LYMPHOCYTES 19 13 - 44 %    MONOCYTES 9 4.0 - 12.0 %    EOSINOPHILS 3 0.5 - 7.8 %    BASOPHILS 0 0.0 - 2.0 %    IMMATURE GRANULOCYTES 1 0.0 - 5.0 %    ABS. NEUTROPHILS 8.4 (H) 1.7 - 8.2 K/UL    ABS. LYMPHOCYTES 2.4 0.5 - 4.6 K/UL    ABS. MONOCYTES 1.1 0.1 - 1.3 K/UL    ABS. EOSINOPHILS 0.4 0.0 - 0.8 K/UL    ABS. BASOPHILS 0.0 0.0 - 0.2 K/UL    ABS. IMM.  GRANS. 0.1 0.0 - 0.5 K/UL   METABOLIC PANEL, BASIC    Collection Time: 03/11/22  4:43 AM   Result Value Ref Range    Sodium 138 136 - 145 mmol/L    Potassium 3.8 3.5 - 5.1 mmol/L    Chloride 110 (H) 98 - 107 mmol/L    CO2 20 (L) 21 - 32 mmol/L    Anion gap 8 7 - 16 mmol/L    Glucose 108 (H) 65 - 100 mg/dL    BUN 22 8 - 23 MG/DL    Creatinine 1.20 (H) 0.6 - 1.0 MG/DL    GFR est AA 55 (L) >60 ml/min/1.73m2    GFR est non-AA 46 (L) >60 ml/min/1.73m2    Calcium 8.7 8.3 - 10.4 MG/DL   GLUCOSE, POC    Collection Time: 03/11/22  7:45 AM   Result Value Ref Range    Glucose (POC) 131 (H) 65 - 100 mg/dL    Performed by [employer]    GLUCOSE, POC    Collection Time: 03/11/22 11:59 AM   Result Value Ref Range    Glucose (POC) 158 (H) 65 - 100 mg/dL    Performed by [employer]        All Micro Results     Procedure Component Value Units Date/Time    CULTURE, BLOOD [[id number]] Collected: 03/07/22 0919    Order Status: Completed Specimen: Blood Updated: 03/11/22 0714     Special Requests: --        RIGHT  HAND       Culture result: NO GROWTH 4 DAYS       CULTURE, BLOOD [[id number]] Collected: 03/07/22 0929    Order Status: Completed Specimen: Blood Updated: 03/11/22 0714     Special Requests: -- RIGHT  FOREARM       Culture result: NO GROWTH 4 DAYS       CULTURE, URINE [[id number]]  (Abnormal)  (Susceptibility) Collected: 03/06/22 3940    Order Status: Completed Specimen: Urine from Clean catch Updated: 03/09/22 1313     Special Requests: NO SPECIAL REQUESTS        Culture result:       >100,000 COLONIES/mL ESCHERICHIA COLI                Other Studies:  No results found.     Current Meds:  Current Facility-Administered Medications   Medication Dose Route Frequency    0.9% sodium chloride infusion  100 mL/hr IntraVENous CONTINUOUS    vancomycin (VANCOCIN) 1250 mg in  ml infusion  1,250 mg IntraVENous Q24H    nystatin (MYCOSTATIN) 100,000 unit/gram powder   Topical BID    zinc oxide-cod liver oil (DESITIN) 40 % paste   Topical PRN    [Held by provider] enoxaparin (LOVENOX) injection 40 mg  40 mg SubCUTAneous DAILY    sodium chloride (NS) flush 5-40 mL  5-40 mL IntraVENous Q8H    sodium chloride (NS) flush 5-40 mL  5-40 mL IntraVENous PRN    acetaminophen (TYLENOL) tablet 650 mg  650 mg Oral Q6H PRN    Or    acetaminophen (TYLENOL) suppository 650 mg  650 mg Rectal Q6H PRN    polyethylene glycol (MIRALAX) packet 17 g  17 g Oral DAILY PRN    ondansetron (ZOFRAN ODT) tablet 4 mg  4 mg Oral Q8H PRN    Or    ondansetron (ZOFRAN) injection 4 mg  4 mg IntraVENous Q6H PRN    insulin lispro (HUMALOG) injection   SubCUTAneous AC&HS    [Held by provider] amLODIPine (NORVASC) tablet 5 mg  5 mg Oral DAILY    aspirin delayed-release tablet 81 mg  81 mg Oral DAILY    cetirizine (ZYRTEC) tablet 10 mg  10 mg Oral DAILY PRN    dorzolamide-timoloL (COSOPT) 22.3-6.8 mg/mL ophthalmic solution 1 Drop  1 Drop Both Eyes BID    [Held by provider] furosemide (LASIX) tablet 20 mg  20 mg Oral DAILY    latanoprost (XALATAN) 0.005 % ophthalmic solution 1 Drop  1 Drop Both Eyes QHS    levothyroxine (SYNTHROID) tablet 50 mcg  50 mcg Oral ACB    [Held by provider] losartan (COZAAR) tablet 100 mg  100 mg Oral DAILY    [Held by provider] metoprolol succinate (TOPROL-XL) XL tablet 100 mg  100 mg Oral DAILY    pantoprazole (PROTONIX) tablet 40 mg  40 mg Oral ACB    pregabalin (LYRICA) capsule 50 mg  50 mg Oral BID    rosuvastatin (CRESTOR) tablet 20 mg  20 mg Oral QHS    HYDROcodone-acetaminophen (NORCO) 7.5-325 mg per tablet 1 Tablet  1 Tablet Oral Q6H PRN       Signed:  Alfreda Garcia MD    Part of this note may have been written by using a voice dictation software. The note has been proof read but may still contain some grammatical/other typographical errors.

## 2022-03-11 NOTE — PROGRESS NOTES
[facility] accepted bed offer. Notified Xavi mckeon, patient's niece; family in agreement for SNF, [facility].

## 2022-03-11 NOTE — PROGRESS NOTES
LOS 4d  Chart reviewed by [facility] and discussed in IDR. Patient transferred from room  to [id number]. Patient pending placement to [facility].  following.

## 2022-03-12 PROBLEM — N39.0 E. COLI UTI (URINARY TRACT INFECTION): Status: ACTIVE | Noted: 2022-03-12

## 2022-03-12 PROBLEM — B96.20 E. COLI UTI (URINARY TRACT INFECTION): Status: ACTIVE | Noted: 2022-03-12

## 2022-03-12 PROBLEM — D64.9 ACUTE ON CHRONIC ANEMIA: Status: ACTIVE | Noted: 2022-03-12

## 2022-03-12 LAB
ANION GAP SERPL CALC-SCNC: 8 MMOL/L (ref 7–16)
BACTERIA SPEC CULT: NORMAL
BACTERIA SPEC CULT: NORMAL
BASOPHILS # BLD: 0 K/UL (ref 0–0.2)
BASOPHILS NFR BLD: 0 % (ref 0–2)
BUN SERPL-MCNC: 18 MG/DL (ref 8–23)
CALCIUM SERPL-MCNC: 8.2 MG/DL (ref 8.3–10.4)
CHLORIDE SERPL-SCNC: 113 MMOL/L (ref 98–107)
CO2 SERPL-SCNC: 19 MMOL/L (ref 21–32)
CREAT SERPL-MCNC: 1 MG/DL (ref 0.6–1)
DIFFERENTIAL METHOD BLD: ABNORMAL
EOSINOPHIL # BLD: 0.3 K/UL (ref 0–0.8)
EOSINOPHIL NFR BLD: 2 % (ref 0.5–7.8)
ERYTHROCYTE [DISTWIDTH] IN BLOOD BY AUTOMATED COUNT: 14.4 % (ref 11.9–14.6)
GLUCOSE BLD STRIP.AUTO-MCNC: 112 MG/DL (ref 65–100)
GLUCOSE BLD STRIP.AUTO-MCNC: 118 MG/DL (ref 65–100)
GLUCOSE BLD STRIP.AUTO-MCNC: 123 MG/DL (ref 65–100)
GLUCOSE BLD STRIP.AUTO-MCNC: 155 MG/DL (ref 65–100)
GLUCOSE SERPL-MCNC: 117 MG/DL (ref 65–100)
HCT VFR BLD AUTO: 25.2 % (ref 35.8–46.3)
HGB BLD-MCNC: 7.8 G/DL (ref 11.7–15.4)
IMM GRANULOCYTES # BLD AUTO: 0.2 K/UL (ref 0–0.5)
IMM GRANULOCYTES NFR BLD AUTO: 1 % (ref 0–5)
LYMPHOCYTES # BLD: 2 K/UL (ref 0.5–4.6)
LYMPHOCYTES NFR BLD: 14 % (ref 13–44)
MCH RBC QN AUTO: 29.3 PG (ref 26.1–32.9)
MCHC RBC AUTO-ENTMCNC: 31 G/DL (ref 31.4–35)
MCV RBC AUTO: 94.7 FL (ref 79.6–97.8)
MONOCYTES # BLD: 1.1 K/UL (ref 0.1–1.3)
MONOCYTES NFR BLD: 8 % (ref 4–12)
NEUTS SEG # BLD: 10.4 K/UL (ref 1.7–8.2)
NEUTS SEG NFR BLD: 74 % (ref 43–78)
NRBC # BLD: 0 K/UL (ref 0–0.2)
PLATELET # BLD AUTO: 312 K/UL (ref 150–450)
PMV BLD AUTO: 9.8 FL (ref 9.4–12.3)
POTASSIUM SERPL-SCNC: 3.4 MMOL/L (ref 3.5–5.1)
PROCALCITONIN SERPL-MCNC: 0.12 NG/ML (ref 0–0.49)
RBC # BLD AUTO: 2.66 M/UL (ref 4.05–5.2)
SERVICE CMNT-IMP: ABNORMAL
SERVICE CMNT-IMP: NORMAL
SERVICE CMNT-IMP: NORMAL
SODIUM SERPL-SCNC: 140 MMOL/L (ref 136–145)
VANCOMYCIN SERPL-MCNC: 18.4 UG/ML
WBC # BLD AUTO: 14.1 K/UL (ref 4.3–11.1)

## 2022-03-12 PROCEDURE — 65270000029 HC RM PRIVATE

## 2022-03-12 PROCEDURE — 77030038269 HC DRN EXT URIN PURWCK BARD -A

## 2022-03-12 PROCEDURE — 74011250636 HC RX REV CODE- 250/636: Performed by: STUDENT IN AN ORGANIZED HEALTH CARE EDUCATION/TRAINING PROGRAM

## 2022-03-12 PROCEDURE — 74011250637 HC RX REV CODE- 250/637: Performed by: HOSPITALIST

## 2022-03-12 PROCEDURE — 36415 COLL VENOUS BLD VENIPUNCTURE: CPT

## 2022-03-12 PROCEDURE — 80202 ASSAY OF VANCOMYCIN: CPT

## 2022-03-12 PROCEDURE — 74011250637 HC RX REV CODE- 250/637: Performed by: INTERNAL MEDICINE

## 2022-03-12 PROCEDURE — 85025 COMPLETE CBC W/AUTO DIFF WBC: CPT

## 2022-03-12 PROCEDURE — 80048 BASIC METABOLIC PNL TOTAL CA: CPT

## 2022-03-12 PROCEDURE — 74011250636 HC RX REV CODE- 250/636: Performed by: INTERNAL MEDICINE

## 2022-03-12 PROCEDURE — 74011000258 HC RX REV CODE- 258: Performed by: STUDENT IN AN ORGANIZED HEALTH CARE EDUCATION/TRAINING PROGRAM

## 2022-03-12 PROCEDURE — 84145 PROCALCITONIN (PCT): CPT

## 2022-03-12 PROCEDURE — 74011250637 HC RX REV CODE- 250/637: Performed by: STUDENT IN AN ORGANIZED HEALTH CARE EDUCATION/TRAINING PROGRAM

## 2022-03-12 PROCEDURE — 77030040361 HC SLV COMPR DVT MDII -B

## 2022-03-12 PROCEDURE — 82962 GLUCOSE BLOOD TEST: CPT

## 2022-03-12 PROCEDURE — 2709999900 HC NON-CHARGEABLE SUPPLY

## 2022-03-12 PROCEDURE — 74011636637 HC RX REV CODE- 636/637: Performed by: INTERNAL MEDICINE

## 2022-03-12 RX ORDER — POTASSIUM CHLORIDE 20 MEQ/1
40 TABLET, EXTENDED RELEASE ORAL
Status: COMPLETED | OUTPATIENT
Start: 2022-03-12 | End: 2022-03-12

## 2022-03-12 RX ADMIN — ACETAMINOPHEN 650 MG: 325 TABLET ORAL at 22:24

## 2022-03-12 RX ADMIN — POTASSIUM CHLORIDE 40 MEQ: 20 TABLET, EXTENDED RELEASE ORAL at 12:13

## 2022-03-12 RX ADMIN — DORZOLAMIDE HYDROCHLORIDE AND TIMOLOL MALEATE 1 DROP: 20; 5 SOLUTION/ DROPS OPHTHALMIC at 08:35

## 2022-03-12 RX ADMIN — SODIUM CHLORIDE 100 ML/HR: 900 INJECTION, SOLUTION INTRAVENOUS at 00:00

## 2022-03-12 RX ADMIN — INSULIN LISPRO 2 UNITS: 100 INJECTION, SOLUTION INTRAVENOUS; SUBCUTANEOUS at 12:13

## 2022-03-12 RX ADMIN — PREGABALIN 50 MG: 50 CAPSULE ORAL at 22:24

## 2022-03-12 RX ADMIN — ROSUVASTATIN CALCIUM 20 MG: 20 TABLET, FILM COATED ORAL at 22:24

## 2022-03-12 RX ADMIN — VANCOMYCIN HYDROCHLORIDE 1250 MG: 10 INJECTION, POWDER, LYOPHILIZED, FOR SOLUTION INTRAVENOUS at 10:59

## 2022-03-12 RX ADMIN — DORZOLAMIDE HYDROCHLORIDE AND TIMOLOL MALEATE 1 DROP: 20; 5 SOLUTION/ DROPS OPHTHALMIC at 17:22

## 2022-03-12 RX ADMIN — ASPIRIN 81 MG: 81 TABLET ORAL at 08:33

## 2022-03-12 RX ADMIN — CEFTRIAXONE 1 G: 1 INJECTION, POWDER, FOR SOLUTION INTRAMUSCULAR; INTRAVENOUS at 14:28

## 2022-03-12 RX ADMIN — NYSTATIN: 100000 POWDER TOPICAL at 08:35

## 2022-03-12 RX ADMIN — LATANOPROST 1 DROP: 50 SOLUTION/ DROPS OPHTHALMIC at 22:26

## 2022-03-12 RX ADMIN — LEVOTHYROXINE SODIUM 50 MCG: 0.05 TABLET ORAL at 05:50

## 2022-03-12 RX ADMIN — PREGABALIN 50 MG: 50 CAPSULE ORAL at 08:33

## 2022-03-12 RX ADMIN — HYDROCODONE BITARTRATE AND ACETAMINOPHEN 1 TABLET: 7.5; 325 TABLET ORAL at 08:33

## 2022-03-12 RX ADMIN — SODIUM CHLORIDE 100 ML/HR: 900 INJECTION, SOLUTION INTRAVENOUS at 11:00

## 2022-03-12 RX ADMIN — SODIUM CHLORIDE 100 ML/HR: 900 INJECTION, SOLUTION INTRAVENOUS at 22:26

## 2022-03-12 RX ADMIN — PANTOPRAZOLE SODIUM 40 MG: 40 TABLET, DELAYED RELEASE ORAL at 05:50

## 2022-03-12 RX ADMIN — NYSTATIN: 100000 POWDER TOPICAL at 17:21

## 2022-03-12 RX ADMIN — ACETAMINOPHEN 650 MG: 325 TABLET ORAL at 05:55

## 2022-03-12 NOTE — PROGRESS NOTES
Hospitalist Progress Note   Admit Date:  3/6/2022  8:22 PM   Name:  Makayla Brandt   Age:  78 y.o. Sex:  female  :  1940   MRN:  [id number]   Room:      Presenting Complaint: Fall and Head Injury    Reason(s) for Admission: Fall [W19. XXXA]  Humerus fracture [S42.309A]  UTI (urinary tract infection) [N39.0]     Hospital Course & Interval History:   Patient is an 77 y/o female with PMH hypertension, hypothyroidism, CAD, chronic LBBB, AV Sclerosis, DM II who presented to ED s/p mechanical fall. Patient lives alone, uses a walker, and fell on Thursday 3/3. She was found by family member 2.5-3 hours later who subsequently called medic who helped patient from the ground but did not feel her arm was broken. She was unable to use her walker thereafter, and family could no longer care for her. They brought her to ED requesting rehab placement. ED workup revealed left proximal humeral fracture. CT head negative. Patient also met sepsis criteria with UTI. She was admitted by hospitalist team fur further inpatient management. PT/OT evaluations with STR recommendations. Placement obtained at [facility] but cannot occur this weekend per facility. Anticipated d/c Monday, 3/14. Subjective/24hr Events (22): Patient evaluated, resting supine in bed, calm, pleasant, and conversational. She denies chest pain. Endorses mild SOB chronic at baseline with no current worsening. She endorses mild abdominal discomfort generalized but is tolerating oral diet without complications. Left arm remains in sling, pain controlled. She has no complaints today and is very appreciative of the care. I offered to call family with an update but she declined saying that was not necessary.     Assessment & Plan:     Left Humerus fracture  -s/p mechanical fall at home 3/3  -Ortho evaluated 3/7, manage non-operatively with LUE sling  -Outpatient ortho follow-up in 2 weeks    Sepsis ([address] present on admission  -Criteria on admission: leukocytosis, febrile, LA 1.5  -s/p 1L IVF bolus, it appears Ceftriaxone was ordered but not initiated on admission  -BCx 2/7 finalized negative  -Procal 0.55  -Ceftriaxone added 3/8-3/10 for UTI   -Vancomycin added 3/10 for persistent leukocytosis and hypotension  -CXR obtained 3/7 and 3/10 negative   -Mild Leukocytosis persistent but has improved from presentation (19 --> 12-14 currently), afebrile, remains on Vancomycin only, I have added procalcitonin, remains afebrile, consider d/c Vanc and monitor off antibiotics    UTI (urinary tract infection)   -UCx with pan-sensitive E.  Coli, s/p Ceftriaxone 3/8-3/10    Acute kidney injury superimposed on chronic kidney disease ([facility] [address] resolved  -Renal function currently stable at baseline, Cr. 1.00    Diabetic polyneuropathy (HCC)   -Continue Pregabalin    Diabetes mellitus II ([facility] [address]   -Hold home Glipizide, A1C 6.7  -Glucose currently well controlled    Hypothyroidism  -Continue Synthroid    Class 2 severe obesity due to excess calories with serious comorbidity in adult [facility])   -Encourage diet and lifestyle modifications    Essential hypertension  -BB/CCB/ARB held 3/10 for hypotension  -BP currently stable without intervention, 120/60, remains on 100 cc/hr IVF    CAD involving native coronary artery of native heart without angina pectoris   -Continue Rosuvastatin  -BB and Losartan held for hypotension, ASA held for acute anemia    Chronic LBBB  AV Sclerosis  -noted, Follows Dr. Marinelli outpatient, surveillance Echos    Frequent Falls  Adult failure to thrive   -Current presentation due to fall, also noted with fall early March evaluated by PCP  -PT/OT with STR recommendations    Hypokalemia  -K 3.4, replete PO, monitor am labs with Magnesium    Coccyx Wound  -Wound nurse evaluated 3/7, continue silicone foam dressing, frequent turns    Acute on chronic Anemia  -Noted with hx of iron deficiency anemia 2006 and anemia of CKD, on low-dose Aranesp in the past, low B12 2016 requiring supplementation  -Hgb drop from 12.0 to 7.9 3/9 but with no overt signs of bleeding  -Lovenox/ASA held, avoid NSAID's  -Anemia studies unrevealing currently, order occult stool if able to obtain specimen   -Monitor H&H, transfuse as indicated for Hgb < 7  -Hgb currently 7.8    Dispo/Discharge Planning:  Anticipate d/c to STR Monday, 3/14    Diet:  ADULT DIET Easy to Chew; 4 carb choices (60 gm/meal); Low Fat/Low Chol/High Fiber/2 gm Na  DVT PPx: SCD's  Code status: Full Code    Hospital Problems as of 3/12/2022 Date Reviewed: 12/22/2021          Codes Class Noted - Resolved POA    E. coli UTI (urinary tract infection) ICD-10-CM: N39.0, B96.20  ICD-9-CM: 599.0, 041.49  3/12/2022 - Present Unknown        Sepsis ([facility] ICD-10-CM: A41.9  ICD-9-CM: 038.9, 995.91  3/10/2022 - Present Unknown        Adult failure to thrive ICD-10-CM: R62.7  ICD-9-CM: 783.7  3/7/2022 - Present Unknown        Acute kidney injury superimposed on chronic kidney disease ([facility] ICD-10-CM: N17.9, N18.9  ICD-9-CM: 866.00, 585.9  3/7/2022 - Present Unknown        * (Principal) Humerus fracture ICD-10-CM: S42.309A  ICD-9-CM: 812.20  3/6/2022 - Present Unknown        Fall ICD-10-CM: W19. Riley Constable  ICD-9-CM: E888.9  3/6/2022 - Present Unknown        Coronary artery disease involving native coronary artery of native heart without angina pectoris ICD-10-CM: I25.10  ICD-9-CM: 414.01  5/31/2017 - Present Yes        Essential hypertension ICD-10-CM: I10  ICD-9-CM: 401.9  5/4/2017 - Present Yes        Type 2 diabetes mellitus ([facility] ICD-10-CM: E11.9  ICD-9-CM: 250.00  4/5/2017 - Present Yes    Overview Signed 4/5/2017  2:43 PM by Daniela TEAGUE     Last Assessment & Plan:   Check hemoglobin A1c. SSI with FSBG checks qAC and qhs. Adjust insulin as needed based on FSBG results. Diabetic diet and education as needed.              Hypoactive thyroid ICD-10-CM: E03.9  ICD-9-CM: 244.9  4/5/2017 - Present Yes    Overview Signed 4/5/2017  2:43 PM by Brittany TEAGUE     Last Assessment & Plan:   Continue home thyroid replacement meds. Check TSH in the AM and consider med adjustments based on results. Class 2 severe obesity due to excess calories with serious comorbidity in adult [facility]) ICD-10-CM: E66.01  ICD-9-CM: 278.01  4/5/2017 - Present Yes        Diabetic polyneuropathy ([address] ICD-10-CM: E11.42  ICD-9-CM: 250.60, 357.2  2/1/2017 - Present Yes    Overview Signed 4/5/2017  2:43 PM by Brittany TEAGUE     Last Assessment & Plan:   The patient's clinical history and presentation is more consistent with a diabetic neuropathy that is new onset in this patient with long-standing diabetes mellitus type 2 and diabetic nephropathy. I will start her on Lyrica therapy starting today. Physical therapy to evaluate for mobility on this drug. We will continue to follow closely. No clinical signs or symptoms of gout or cellulitis. Chronic kidney disease ICD-10-CM: N18.9  ICD-9-CM: 585.9  2/18/2016 - Present Yes    Overview Signed 4/5/2017  2:43 PM by Brittany TEAGUE     Last Assessment & Plan: This is likely from diabetic nephropathy and long-standing diabetes mellitus type 2  Continue current care and give IV fluids overnight. Repeat BMP in the morning. Objective:     Patient Vitals for the past 24 hrs:   Temp Pulse Resp BP SpO2   03/12/22 1128 97.5 °F (36.4 °C) 69 19 120/60 96 %   03/12/22 0721 98.5 °F (36.9 °C) 86 23 107/71 97 %   03/12/22 0425 98 °F (36.7 °C) 76 18 123/72 96 %   03/11/22 2311 98 °F (36.7 °C) 78 18 114/61 94 %   03/11/22 1922 98.2 °F (36.8 °C) 75 18 135/65 96 %   03/11/22 1534 97.9 °F (36.6 °C) 79 17 133/67 97 %     Oxygen Therapy  O2 Sat (%): 96 % (03/12/22 1128)  Pulse via Oximetry: 67 beats per minute (03/10/22 0348)  O2 Device: None (Room air) (03/11/22 1922)    Estimated body mass index is 37.18 kg/m² as calculated from the following:    Height as of this encounter: 5' 1.5\" (1.562 m).     Weight as of this encounter: 90.7 kg (200 lb). Intake/Output Summary (Last 24 hours) at 3/12/2022 1205  Last data filed at 3/12/2022 0906  Gross per 24 hour   Intake 3789.86 ml   Output 2300 ml   Net 1489.86 ml         Physical Exam:   Blood pressure 120/60, pulse 69, temperature 97.5 °F (36.4 °C), resp. rate 19, height 5' 1.5\" (1.562 m), weight 90.7 kg (200 lb), SpO2 96 %. General:    Frail, alert, conversational, in no acute distress  Head:  Normocephalic, atraumatic  Eyes:  Sclerae appear normal.  Pupils equally round. ENT:  Nares appear normal, no drainage. Moist oral mucosa  Neck:  No restricted ROM. Trachea midline   CV:   RRR.  + Murmur. No jugular venous distension. Lungs:   CTAB, diminished bilateral bases. No wheezing, rhonchi, or rales. Respirations even, unlabored on RA  Abdomen: Bowel sounds present. Soft, nontender, nondistended. Extremities: No cyanosis or clubbing. No edema. LUE with sling intact/sensation and  strength intact  Skin:     No rashes and normal coloration. Warm and dry. Neuro:  CN II-XII grossly intact. Sensation intact. A&Ox3. Moves all 4 extremities. Follows commands. No focal deficits. Psych:  Normal mood and affect.       I have reviewed ordered lab tests and independently visualized imaging below:    Recent Labs:  Recent Results (from the past 48 hour(s))   GLUCOSE, POC    Collection Time: 03/10/22  4:26 PM   Result Value Ref Range    Glucose (POC) 117 (H) 65 - 100 mg/dL    Performed by Romero    GLUCOSE, POC    Collection Time: 03/10/22  8:40 PM   Result Value Ref Range    Glucose (POC) 111 (H) 65 - 100 mg/dL    Performed by [employer]AN    CBC WITH AUTOMATED DIFF    Collection Time: 03/11/22  4:43 AM   Result Value Ref Range    WBC 12.4 (H) 4.3 - 11.1 K/uL    RBC 2.89 (L) 4.05 - 5.2 M/uL    HGB 8.5 (L) 11.7 - 15.4 g/dL    HCT 28.1 (L) 35.8 - 46.3 %    MCV 97.2 79.6 - 97.8 FL    MCH 29.4 26.1 - 32.9 PG    MCHC 30.2 (L) 31.4 - 35.0 g/dL    RDW 14.2 11.9 - 14.6 %    PLATELET 183 394 - 113 K/uL    MPV 9.8 9.4 - 12.3 FL    ABSOLUTE NRBC 0.00 0.0 - 0.2 K/uL    DF AUTOMATED      NEUTROPHILS 68 43 - 78 %    LYMPHOCYTES 19 13 - 44 %    MONOCYTES 9 4.0 - 12.0 %    EOSINOPHILS 3 0.5 - 7.8 %    BASOPHILS 0 0.0 - 2.0 %    IMMATURE GRANULOCYTES 1 0.0 - 5.0 %    ABS. NEUTROPHILS 8.4 (H) 1.7 - 8.2 K/UL    ABS. LYMPHOCYTES 2.4 0.5 - 4.6 K/UL    ABS. MONOCYTES 1.1 0.1 - 1.3 K/UL    ABS. EOSINOPHILS 0.4 0.0 - 0.8 K/UL    ABS. BASOPHILS 0.0 0.0 - 0.2 K/UL    ABS. IMM.  GRANS. 0.1 0.0 - 0.5 K/UL   METABOLIC PANEL, BASIC    Collection Time: 03/11/22  4:43 AM   Result Value Ref Range    Sodium 138 136 - 145 mmol/L    Potassium 3.8 3.5 - 5.1 mmol/L    Chloride 110 (H) 98 - 107 mmol/L    CO2 20 (L) 21 - 32 mmol/L    Anion gap 8 7 - 16 mmol/L    Glucose 108 (H) 65 - 100 mg/dL    BUN 22 8 - 23 MG/DL    Creatinine 1.20 (H) 0.6 - 1.0 MG/DL    GFR est AA 55 (L) >60 ml/min/1.73m2    GFR est non-AA 46 (L) >60 ml/min/1.73m2    Calcium 8.7 8.3 - 10.4 MG/DL   GLUCOSE, POC    Collection Time: 03/11/22  7:45 AM   Result Value Ref Range    Glucose (POC) 131 (H) 65 - 100 mg/dL    Performed by CombsCourtneyPCA    GLUCOSE, POC    Collection Time: 03/11/22 11:59 AM   Result Value Ref Range    Glucose (POC) 158 (H) 65 - 100 mg/dL    Performed by CombsCourtneyPCA    GLUCOSE, POC    Collection Time: 03/11/22  4:36 PM   Result Value Ref Range    Glucose (POC) 140 (H) 65 - 100 mg/dL    Performed by CombsCourtneyPCA    GLUCOSE, POC    Collection Time: 03/11/22  9:12 PM   Result Value Ref Range    Glucose (POC) 141 (H) 65 - 100 mg/dL    Performed by Bre    CBC WITH AUTOMATED DIFF    Collection Time: 03/12/22  7:17 AM   Result Value Ref Range    WBC 14.1 (H) 4.3 - 11.1 K/uL    RBC 2.66 (L) 4.05 - 5.2 M/uL    HGB 7.8 (L) 11.7 - 15.4 g/dL    HCT 25.2 (L) 35.8 - 46.3 %    MCV 94.7 79.6 - 97.8 FL    MCH 29.3 26.1 - 32.9 PG    MCHC 31.0 (L) 31.4 - 35.0 g/dL    RDW 14.4 11.9 - 14.6 %    PLATELET 556 017 - 450 K/uL    MPV 9.8 9.4 - 12.3 FL    ABSOLUTE NRBC 0.00 0.0 - 0.2 K/uL    DF AUTOMATED      NEUTROPHILS 74 43 - 78 %    LYMPHOCYTES 14 13 - 44 %    MONOCYTES 8 4.0 - 12.0 %    EOSINOPHILS 2 0.5 - 7.8 %    BASOPHILS 0 0.0 - 2.0 %    IMMATURE GRANULOCYTES 1 0.0 - 5.0 %    ABS. NEUTROPHILS 10.4 (H) 1.7 - 8.2 K/UL    ABS. LYMPHOCYTES 2.0 0.5 - 4.6 K/UL    ABS. MONOCYTES 1.1 0.1 - 1.3 K/UL    ABS. EOSINOPHILS 0.3 0.0 - 0.8 K/UL    ABS. BASOPHILS 0.0 0.0 - 0.2 K/UL    ABS. IMM.  GRANS. 0.2 0.0 - 0.5 K/UL   METABOLIC PANEL, BASIC    Collection Time: 03/12/22  7:17 AM   Result Value Ref Range    Sodium 140 136 - 145 mmol/L    Potassium 3.4 (L) 3.5 - 5.1 mmol/L    Chloride 113 (H) 98 - 107 mmol/L    CO2 19 (L) 21 - 32 mmol/L    Anion gap 8 7 - 16 mmol/L    Glucose 117 (H) 65 - 100 mg/dL    BUN 18 8 - 23 MG/DL    Creatinine 1.00 0.6 - 1.0 MG/DL    GFR est AA >60 >60 ml/min/1.73m2    GFR est non-AA 56 (L) >60 ml/min/1.73m2    Calcium 8.2 (L) 8.3 - 10.4 MG/DL   VANCOMYCIN, RANDOM    Collection Time: 03/12/22  7:17 AM   Result Value Ref Range    Vancomycin, random 18.4 UG/ML   GLUCOSE, POC    Collection Time: 03/12/22  7:21 AM   Result Value Ref Range    Glucose (POC) 123 (H) 65 - 100 mg/dL    Performed by CombsCourtneyPCA    GLUCOSE, POC    Collection Time: 03/12/22 11:27 AM   Result Value Ref Range    Glucose (POC) 155 (H) 65 - 100 mg/dL    Performed by CombsCourtneyPCA        All Micro Results     Procedure Component Value Units Date/Time    CULTURE, BLOOD [[id number]] Collected: 03/07/22 0929    Order Status: Completed Specimen: Blood Updated: 03/12/22 0915     Special Requests: --        RIGHT  FOREARM       Culture result: NO GROWTH 5 DAYS       CULTURE, BLOOD [[id number]] Collected: 03/07/22 0919    Order Status: Completed Specimen: Blood Updated: 03/12/22 0915     Special Requests: --        RIGHT  HAND       Culture result: NO GROWTH 5 DAYS       CULTURE, URINE [[id number]]  (Abnormal)  (Susceptibility) Collected: 03/06/22 2235    Order Status: Completed Specimen: Urine from Clean catch Updated: 03/09/22 5727     Special Requests: NO SPECIAL REQUESTS        Culture result:       >100,000 COLONIES/mL ESCHERICHIA COLI                Other Studies:  No results found.     Current Meds:  Current Facility-Administered Medications   Medication Dose Route Frequency    potassium chloride (K-DUR, KLOR-CON M20) SR tablet 40 mEq  40 mEq Oral NOW    0.9% sodium chloride infusion  100 mL/hr IntraVENous CONTINUOUS    vancomycin (VANCOCIN) 1250 mg in  ml infusion  1,250 mg IntraVENous Q24H    nystatin (MYCOSTATIN) 100,000 unit/gram powder   Topical BID    zinc oxide-cod liver oil (DESITIN) 40 % paste   Topical PRN    [Held by provider] enoxaparin (LOVENOX) injection 40 mg  40 mg SubCUTAneous DAILY    sodium chloride (NS) flush 5-40 mL  5-40 mL IntraVENous Q8H    sodium chloride (NS) flush 5-40 mL  5-40 mL IntraVENous PRN    acetaminophen (TYLENOL) tablet 650 mg  650 mg Oral Q6H PRN    Or    acetaminophen (TYLENOL) suppository 650 mg  650 mg Rectal Q6H PRN    polyethylene glycol (MIRALAX) packet 17 g  17 g Oral DAILY PRN    ondansetron (ZOFRAN ODT) tablet 4 mg  4 mg Oral Q8H PRN    Or    ondansetron (ZOFRAN) injection 4 mg  4 mg IntraVENous Q6H PRN    insulin lispro (HUMALOG) injection   SubCUTAneous AC&HS    [Held by provider] amLODIPine (NORVASC) tablet 5 mg  5 mg Oral DAILY    aspirin delayed-release tablet 81 mg  81 mg Oral DAILY    cetirizine (ZYRTEC) tablet 10 mg  10 mg Oral DAILY PRN    dorzolamide-timoloL (COSOPT) 22.3-6.8 mg/mL ophthalmic solution 1 Drop  1 Drop Both Eyes BID    [Held by provider] furosemide (LASIX) tablet 20 mg  20 mg Oral DAILY    latanoprost (XALATAN) 0.005 % ophthalmic solution 1 Drop  1 Drop Both Eyes QHS    levothyroxine (SYNTHROID) tablet 50 mcg  50 mcg Oral ACB    [Held by provider] losartan (COZAAR) tablet 100 mg  100 mg Oral DAILY    [Held by provider] metoprolol succinate (TOPROL-XL) XL tablet 100 mg  100 mg Oral DAILY    pantoprazole (PROTONIX) tablet 40 mg  40 mg Oral ACB    pregabalin (LYRICA) capsule 50 mg  50 mg Oral BID    rosuvastatin (CRESTOR) tablet 20 mg  20 mg Oral QHS    HYDROcodone-acetaminophen (NORCO) 7.5-325 mg per tablet 1 Tablet  1 Tablet Oral Q6H PRN     Labs, vital signs, diagnostic imaging reviewed. Case discussed with patient, care team, Dr. Moise Evans.     Signed:  Maik Sol NP

## 2022-03-12 NOTE — PROGRESS NOTES
Problem: Pressure Injury - Risk of  Goal: *Prevention of pressure injury  Description: Document Dustin Scale and appropriate interventions in the flowsheet.   Outcome: Progressing Towards Goal  Note: Pressure Injury Interventions:  Sensory Interventions: Assess changes in LOC    Moisture Interventions: Absorbent underpads,Internal/External urinary devices    Activity Interventions: Increase time out of bed,Pressure redistribution bed/mattress(bed type),PT/OT evaluation    Mobility Interventions: Pressure redistribution bed/mattress (bed type),PT/OT evaluation    Nutrition Interventions: Offer support with meals,snacks and hydration    Friction and Shear Interventions: Apply protective barrier, creams and emollients

## 2022-03-12 NOTE — PROGRESS NOTES
END OF SHIFT NOTE:    INTAKE/OUTPUT  03/10 0701 - 03/11 0700  In: 1310.3 [P.O.:240; I.V.:1070.3]  Out: 1450 [Urine:1450]  Voiding: YES  Catheter: NO  Drain:   External Urinary Catheter 03/07/22 (Active)   Site Assessment Clean, dry, & intact 03/11/22 1354   Repositioned Yes 03/11/22 1354   Perineal Care Yes 03/11/22 1354   Wick Changed Yes 03/11/22 1520   Suction Canister/Tubing Changed Yes 03/11/22 1520   Urine Output (mL) 200 ml 03/11/22 1800               Flatus: Patient does have flatus present. Stool:  0 occurrences. Characteristics:       Emesis: 0 occurrences. Characteristics:        VITAL SIGNS  Patient Vitals for the past 12 hrs:   Temp Pulse Resp BP SpO2   03/11/22 1534 97.9 °F (36.6 °C) 79 17 133/67 97 %   03/11/22 1159 97.4 °F (36.3 °C) 81 -- (!) 108/59 96 %       Pain Assessment  Pain Intensity 1: 7 (03/11/22 1300)  Pain Location 1: Arm  Pain Intervention(s) 1: Ambulation/Increased Activity  Patient Stated Pain Goal: 0    Ambulating  Yes    Shift report given to oncoming nurse at the bedside.     Renetta Wong RN

## 2022-03-12 NOTE — PROGRESS NOTES
VANCO DAILY FOLLOW UP NOTE  [address] [facility] Pharmacokinetic Monitoring Service - Vancomycin    Consulting Provider: Lisandra   Indication: sepsis  Target Concentration: Goal AUC/KAROL 400-600 mg*hr/L  Day of Therapy: 3  Additional Antimicrobials:    Pertinent Laboratory Values: Wt Readings from Last 1 Encounters:   03/06/22 90.7 kg (200 lb)     Temp Readings from Last 1 Encounters:   03/12/22 98.5 °F (36.9 °C)     No components found for: PROCAL  Recent Labs     03/12/22  0717 03/11/22  0443 03/10/22  0302   BUN 18 22 24*   CREA 1.00 1.20* 1.20*   WBC 14.1* 12.4* 14.2*     Estimated Creatinine Clearance: 44.2 mL/min (based on SCr of 1 mg/dL). Lab Results   Component Value Date/Time    Vancomycin, random 18.4 03/12/2022 07:17 AM           Assessment:  Date/Time Dose Concentration AUC   03/12/22 @ 0717 1250 mg Q24H 18.4 536   Note: Serum concentrations collected for AUC dosing may appear elevated if collected in close proximity to the dose administered, this is not necessarily an indication of toxicity    Plan:  Dosing recommendations based on Bayesian software  Continue vancomycin 1250 mg Q24H as regimen therapeutic.   Renal labs as indicated   Vancomycin concentrations will be ordered as clinically appropriate   Pharmacy will continue to monitor patient and adjust therapy as indicated    Thank you for the consult,  Eugenie Peralta, PharmD, BCPS  Clinical Pharmacist  Contact via [facility]

## 2022-03-12 NOTE — PROGRESS NOTES
END OF SHIFT NOTE:    INTAKE/OUTPUT  03/11 0701 - 03/12 0700  In: 1914.9 [P.O.:560; I.V.:1354.9]  Out: 1600 [Urine:1600]  Voiding: YES  Catheter: NO  Drain:   External Urinary Catheter 03/07/22 (Active)   Site Assessment Clean, dry, & intact 03/11/22 1354   Repositioned Yes 03/11/22 1354   Perineal Care Yes 03/11/22 1354   Wick Changed Yes 03/11/22 1520   Suction Canister/Tubing Changed Yes 03/11/22 1520   Urine Output (mL) 200 ml 03/11/22 1800               Flatus: Patient does have flatus present. Stool:  0 occurrences. Characteristics:       Emesis: 0 occurrences. Characteristics:        VITAL SIGNS  Patient Vitals for the past 12 hrs:   Temp Pulse Resp BP SpO2   03/11/22 2311 98 °F (36.7 °C) 78 18 114/61 94 %   03/11/22 1922 98.2 °F (36.8 °C) 75 18 135/65 96 %       Pain Assessment  Pain Intensity 1: 0 (03/11/22 2200)  Pain Location 1: Arm  Pain Intervention(s) 1: Medication (see MAR)  Patient Stated Pain Goal: 0    Ambulating  No  Sling on left arm. Took Norco for pain once this shift with relief. Shift report given to oncoming nurse at the bedside.     Yasmin Rodriguez RN

## 2022-03-12 NOTE — PROGRESS NOTES
Patient medically stable to discharge to [facility] today. CM called and spoke with Lizette Dang from facility who states, \"we only take patient's back on the weekends if they have already been here but we don't accept new admits because there is no one here from admissions to handle the paperwork\". NP advised. Patient able to discharge to facility on Monday if still medically stable.

## 2022-03-13 PROBLEM — E87.29 HYPERCHLOREMIC METABOLIC ACIDOSIS: Status: ACTIVE | Noted: 2022-03-13

## 2022-03-13 LAB
ANION GAP SERPL CALC-SCNC: 7 MMOL/L (ref 7–16)
BASOPHILS # BLD: 0 K/UL (ref 0–0.2)
BASOPHILS NFR BLD: 0 % (ref 0–2)
BUN SERPL-MCNC: 13 MG/DL (ref 8–23)
CALCIUM SERPL-MCNC: 8.5 MG/DL (ref 8.3–10.4)
CHLORIDE SERPL-SCNC: 113 MMOL/L (ref 98–107)
CO2 SERPL-SCNC: 20 MMOL/L (ref 21–32)
CREAT SERPL-MCNC: 1.1 MG/DL (ref 0.6–1)
DIFFERENTIAL METHOD BLD: ABNORMAL
EOSINOPHIL # BLD: 0.3 K/UL (ref 0–0.8)
EOSINOPHIL NFR BLD: 2 % (ref 0.5–7.8)
ERYTHROCYTE [DISTWIDTH] IN BLOOD BY AUTOMATED COUNT: 14.4 % (ref 11.9–14.6)
GLUCOSE BLD STRIP.AUTO-MCNC: 104 MG/DL (ref 65–100)
GLUCOSE BLD STRIP.AUTO-MCNC: 115 MG/DL (ref 65–100)
GLUCOSE BLD STRIP.AUTO-MCNC: 155 MG/DL (ref 65–100)
GLUCOSE BLD STRIP.AUTO-MCNC: 170 MG/DL (ref 65–100)
GLUCOSE SERPL-MCNC: 103 MG/DL (ref 65–100)
HCT VFR BLD AUTO: 26 % (ref 35.8–46.3)
HGB BLD-MCNC: 8 G/DL (ref 11.7–15.4)
IMM GRANULOCYTES # BLD AUTO: 0.2 K/UL (ref 0–0.5)
IMM GRANULOCYTES NFR BLD AUTO: 1 % (ref 0–5)
LYMPHOCYTES # BLD: 2.6 K/UL (ref 0.5–4.6)
LYMPHOCYTES NFR BLD: 20 % (ref 13–44)
MCH RBC QN AUTO: 29.3 PG (ref 26.1–32.9)
MCHC RBC AUTO-ENTMCNC: 30.8 G/DL (ref 31.4–35)
MCV RBC AUTO: 95.2 FL (ref 79.6–97.8)
MONOCYTES # BLD: 1.1 K/UL (ref 0.1–1.3)
MONOCYTES NFR BLD: 9 % (ref 4–12)
NEUTS SEG # BLD: 8.9 K/UL (ref 1.7–8.2)
NEUTS SEG NFR BLD: 68 % (ref 43–78)
NRBC # BLD: 0 K/UL (ref 0–0.2)
PLATELET # BLD AUTO: 325 K/UL (ref 150–450)
PMV BLD AUTO: 10 FL (ref 9.4–12.3)
POTASSIUM SERPL-SCNC: 3.6 MMOL/L (ref 3.5–5.1)
RBC # BLD AUTO: 2.73 M/UL (ref 4.05–5.2)
SERVICE CMNT-IMP: ABNORMAL
SODIUM SERPL-SCNC: 140 MMOL/L (ref 136–145)
WBC # BLD AUTO: 13.1 K/UL (ref 4.3–11.1)

## 2022-03-13 PROCEDURE — 74011250636 HC RX REV CODE- 250/636: Performed by: STUDENT IN AN ORGANIZED HEALTH CARE EDUCATION/TRAINING PROGRAM

## 2022-03-13 PROCEDURE — 74011250637 HC RX REV CODE- 250/637: Performed by: STUDENT IN AN ORGANIZED HEALTH CARE EDUCATION/TRAINING PROGRAM

## 2022-03-13 PROCEDURE — 80048 BASIC METABOLIC PNL TOTAL CA: CPT

## 2022-03-13 PROCEDURE — 87086 URINE CULTURE/COLONY COUNT: CPT

## 2022-03-13 PROCEDURE — 36415 COLL VENOUS BLD VENIPUNCTURE: CPT

## 2022-03-13 PROCEDURE — 74011250636 HC RX REV CODE- 250/636: Performed by: INTERNAL MEDICINE

## 2022-03-13 PROCEDURE — 74011636637 HC RX REV CODE- 636/637: Performed by: INTERNAL MEDICINE

## 2022-03-13 PROCEDURE — 74011250637 HC RX REV CODE- 250/637: Performed by: INTERNAL MEDICINE

## 2022-03-13 PROCEDURE — 74011000258 HC RX REV CODE- 258: Performed by: STUDENT IN AN ORGANIZED HEALTH CARE EDUCATION/TRAINING PROGRAM

## 2022-03-13 PROCEDURE — 82962 GLUCOSE BLOOD TEST: CPT

## 2022-03-13 PROCEDURE — 74011000250 HC RX REV CODE- 250: Performed by: INTERNAL MEDICINE

## 2022-03-13 PROCEDURE — 2709999900 HC NON-CHARGEABLE SUPPLY

## 2022-03-13 PROCEDURE — 85025 COMPLETE CBC W/AUTO DIFF WBC: CPT

## 2022-03-13 PROCEDURE — 81001 URINALYSIS AUTO W/SCOPE: CPT

## 2022-03-13 PROCEDURE — 65270000029 HC RM PRIVATE

## 2022-03-13 RX ORDER — FACIAL-BODY WIPES
10 EACH TOPICAL DAILY
Status: DISCONTINUED | OUTPATIENT
Start: 2022-03-13 | End: 2022-03-14 | Stop reason: HOSPADM

## 2022-03-13 RX ORDER — SODIUM BICARBONATE 650 MG/1
650 TABLET ORAL 2 TIMES DAILY
Status: DISCONTINUED | OUTPATIENT
Start: 2022-03-13 | End: 2022-03-13

## 2022-03-13 RX ORDER — SODIUM BICARBONATE 650 MG/1
650 TABLET ORAL 2 TIMES DAILY
Status: COMPLETED | OUTPATIENT
Start: 2022-03-13 | End: 2022-03-13

## 2022-03-13 RX ADMIN — NYSTATIN: 100000 POWDER TOPICAL at 09:00

## 2022-03-13 RX ADMIN — PREGABALIN 50 MG: 50 CAPSULE ORAL at 09:10

## 2022-03-13 RX ADMIN — LATANOPROST 1 DROP: 50 SOLUTION/ DROPS OPHTHALMIC at 21:19

## 2022-03-13 RX ADMIN — PREGABALIN 50 MG: 50 CAPSULE ORAL at 21:19

## 2022-03-13 RX ADMIN — CEFTRIAXONE 1 G: 1 INJECTION, POWDER, FOR SOLUTION INTRAMUSCULAR; INTRAVENOUS at 17:21

## 2022-03-13 RX ADMIN — SODIUM BICARBONATE 650 MG TABLET 650 MG: at 09:18

## 2022-03-13 RX ADMIN — ROSUVASTATIN CALCIUM 20 MG: 20 TABLET, FILM COATED ORAL at 21:19

## 2022-03-13 RX ADMIN — SODIUM CHLORIDE 100 ML/HR: 900 INJECTION, SOLUTION INTRAVENOUS at 06:37

## 2022-03-13 RX ADMIN — NYSTATIN: 100000 POWDER TOPICAL at 17:22

## 2022-03-13 RX ADMIN — SODIUM CHLORIDE, PRESERVATIVE FREE 10 ML: 5 INJECTION INTRAVENOUS at 21:20

## 2022-03-13 RX ADMIN — LEVOTHYROXINE SODIUM 50 MCG: 0.05 TABLET ORAL at 06:33

## 2022-03-13 RX ADMIN — DORZOLAMIDE HYDROCHLORIDE AND TIMOLOL MALEATE 1 DROP: 20; 5 SOLUTION/ DROPS OPHTHALMIC at 09:24

## 2022-03-13 RX ADMIN — POLYETHYLENE GLYCOL 3350 17 G: 17 POWDER, FOR SOLUTION ORAL at 09:10

## 2022-03-13 RX ADMIN — SODIUM BICARBONATE 650 MG TABLET 650 MG: at 17:21

## 2022-03-13 RX ADMIN — ACETAMINOPHEN 650 MG: 325 TABLET ORAL at 17:20

## 2022-03-13 RX ADMIN — ASPIRIN 81 MG: 81 TABLET ORAL at 09:10

## 2022-03-13 RX ADMIN — DORZOLAMIDE HYDROCHLORIDE AND TIMOLOL MALEATE 1 DROP: 20; 5 SOLUTION/ DROPS OPHTHALMIC at 17:23

## 2022-03-13 RX ADMIN — ACETAMINOPHEN 650 MG: 325 TABLET ORAL at 09:22

## 2022-03-13 RX ADMIN — INSULIN LISPRO 2 UNITS: 100 INJECTION, SOLUTION INTRAVENOUS; SUBCUTANEOUS at 21:19

## 2022-03-13 RX ADMIN — PANTOPRAZOLE SODIUM 40 MG: 40 TABLET, DELAYED RELEASE ORAL at 06:33

## 2022-03-13 NOTE — PROGRESS NOTES
END OF SHIFT NOTE:    INTAKE/OUTPUT  03/12 0701 - 03/13 0700  In: 3554.1 [P.O.:1660; I.V.:1894.1]  Out: 1350 [IAILA:6456]  Voiding: YES  Catheter: NO  Drain:   External Urinary Catheter 03/07/22 (Active)   Site Assessment Clean, dry, & intact 03/12/22 1428   Repositioned Yes 03/12/22 1428   Perineal Care Yes 03/12/22 1455   Wick Changed Yes 03/12/22 1455   Suction Canister/Tubing Changed Yes 03/12/22 1455   Urine Output (mL) 800 ml 03/13/22 0417               Flatus: Patient does have flatus present. Stool:  0 occurrences. Characteristics:       Emesis: 0 occurrences. Characteristics:        VITAL SIGNS  Patient Vitals for the past 12 hrs:   Temp Pulse Resp BP SpO2   03/13/22 0343 98 °F (36.7 °C) 84 20 103/68 94 %   03/12/22 2243 98.2 °F (36.8 °C) 85 20 134/75 95 %   03/12/22 1910 98.1 °F (36.7 °C) 82 20 (!) 149/68 94 %       Pain Assessment  Pain Intensity 1: 0 (03/12/22 2330)  Pain Location 1: Arm  Pain Intervention(s) 1: Medication (see MAR)  Patient Stated Pain Goal: 0    Ambulating  No  Left hand finger swollen. Able to get wedding ring off and placed in tote bag in closet at pt's request. Less than 3 second cap refill in all fingers . Left arm remains in sling. Palpable radial pulses. Shift report given to oncoming nurse at the bedside.     Fernandez Chandler, RN

## 2022-03-13 NOTE — PROGRESS NOTES
END OF SHIFT NOTE:    INTAKE/OUTPUT  03/11 0701 - 03/12 0700  In: 3389.9 [P.O.:800; I.V.:2589.9]  Out: 3100 [Urine:3100]  Voiding: YES  Catheter: NO  Drain:   External Urinary Catheter 03/07/22 (Active)   Site Assessment Clean, dry, & intact 03/12/22 1428   Repositioned Yes 03/12/22 1428   Perineal Care Yes 03/12/22 1455   Wick Changed Yes 03/12/22 1455   Suction Canister/Tubing Changed Yes 03/12/22 1455   Urine Output (mL) 125 ml 03/12/22 1455               Flatus: Patient does have flatus present. Stool:  0 occurrences. Characteristics:       Emesis: 0 occurrences. Characteristics:        VITAL SIGNS  Patient Vitals for the past 12 hrs:   Temp Pulse Resp BP SpO2   03/12/22 1536 97.5 °F (36.4 °C) 77 23 138/62 97 %   03/12/22 1128 97.5 °F (36.4 °C) 69 19 120/60 96 %   03/12/22 0721 98.5 °F (36.9 °C) 86 23 107/71 97 %       Pain Assessment  Pain Intensity 1: 0 (03/12/22 0920)  Pain Location 1: Arm,Shoulder  Pain Intervention(s) 1: Medication (see MAR)  Patient Stated Pain Goal: 0    Ambulating  Yes    Shift report given to oncoming nurse at the bedside.     Maria Isabel Guzman RN

## 2022-03-13 NOTE — PROGRESS NOTES
Hospitalist Progress Note   Admit Date:  3/6/2022  8:22 PM   Name:  Cyndi Nieves   Age:  78 y.o. Sex:  female  :  1940   MRN:  [id number]   Room:      Presenting Complaint: Fall and Head Injury    Reason(s) for Admission: Fall [W19. XXXA]  Humerus fracture [S42.309A]  UTI (urinary tract infection) [N39.0]     Hospital Course & Interval History:   Patient is an 79 y/o female with PMH hypertension, hypothyroidism, CAD, chronic LBBB, AV Sclerosis, DM II who presented to ED s/p mechanical fall. Patient lives alone, uses a walker, and fell on Thursday 3/3. She was found by family member 2.5-3 hours later who subsequently called medic who helped patient from the ground but did not feel her arm was broken. She was unable to use her walker thereafter, and family could no longer care for her. They brought her to ED requesting rehab placement. ED workup revealed left proximal humeral fracture. CT head negative. Patient also met sepsis criteria with UTI. She was admitted by hospitalist team for further management of sepsis and left humerus fracture. Ortho evaluated, manage non-operatively with LUE sling, outpatient follow-up in 2 weeks. PT/OT evaluations with STR recommendations. Placement obtained at [facility] but cannot occur this weekend per facility. Anticipated d/c Monday, 3/14. Subjective/24hr Events (22): Patient evaluated, resting supine in bed, calm, pleasant, and conversational. She denies chest pain, SOB. Endorses constipation, abdomen with generalized tenderness but soft and non-distended. Ambulated to bathroom but no BM today, will try again this afternoon. She does feel fatigued.     Assessment & Plan:     Left Humerus fracture  -s/p mechanical fall at home 3/3  -Ortho evaluated 3/7, manage non-operatively with LUE sling  -Outpatient ortho follow-up in 2 weeks    Sepsis ([address] present on admission, improved  -Criteria on admission: leukocytosis, febrile, LA 1.5  -s/p 1L IVF bolus, it appears Ceftriaxone was ordered but not initiated on admission  -BCx 2/7 finalized negative  -Procal 0.55 --> 0.12  -Ceftriaxone added 3/8-3/10 for UTI  -Vancomycin added 3/10 for persistent leukocytosis and hypotension  -CXR obtained 3/7 and 3/10 negative   -LA resolved, remains afebrile, procal improved to 0.12, hypotension improved, Vanc discontinued 3/12, resume Ceftriaxone to complete 5 day course for UTI eot 3/13  -Persistent mild leukocytosis, appears at baseline per chart review, recommend PCP refer for Hematologist for further evaluation    UTI (urinary tract infection), resolved  -UCx with pan-sensitive E.  Coli, Ceftriaxone 3/8-3/10, 3/12-3/13 to complete 5 day course    Acute kidney injury superimposed on chronic kidney disease ([facility] [address] resolved  -Renal function currently stable at baseline    Diabetic polyneuropathy (HCC)   -Continue Pregabalin    Diabetes mellitus II ([facility] [address]   -Hold home Glipizide, A1C 6.7  -Glucose currently well controlled    Hypothyroidism  -Continue Synthroid    Class 2 severe obesity due to excess calories with serious comorbidity in adult [facility])   -Encourage diet and lifestyle modifications    Essential hypertension  -BB/CCB/ARB held 3/10 for hypotension, placed on 100 cc/hr IVF  -BP currently stable without intervention, 115//75    Hyperchloremic Metabolic Acidosis  -Likely secondary to IVF resuscitation/diffusion, d/c IVF today, monitor repeat labs in am    CAD involving native coronary artery of native heart without angina pectoris   -Continue Rosuvastatin  -BB and Losartan held for hypotension, ASA held for acute anemia    Chronic LBBB  AV Sclerosis  -noted, Follows Dr. Benson Garcia outpatient, surveillance Echos    Frequent Falls  Adult failure to thrive   -Current presentation due to fall, also noted with fall early March evaluated by PCP  -PT/OT with STR recommendations    Hypokalemia, resolved    Coccyx Wound  -Wound nurse evaluated 3/7, continue silicone foam dressing, frequent turns    Acute on chronic Anemia  -Noted with hx of iron deficiency anemia 2006 and anemia of CKD, on low-dose Aranesp in the past, low B12 2016 requiring supplementation  -Hgb drop from 10.6 to 7.9  but with no overt signs of bleeding  -Lovenox/ASA held, avoid NSAID's  -Anemia studies unrevealing currently  -Occult stool ordered, no specimen produced yet    -Hgb currently 8.0, continue to monitor and transfuse as indicated for Hgb < 7    Constipation  -No BM since admission, Miralax and Dulcolax suppository today    Dispo/Discharge Planning:  Anticipate d/c to STR Monday, 3/14    Diet:  ADULT DIET Easy to Chew; 4 carb choices (60 gm/meal); Low Fat/Low Chol/High Fiber/2 gm Na  DVT PPx: SCD's  Code status: Full Code    Hospital Problems as of 3/13/2022 Date Reviewed: 12/22/2021          Codes Class Noted - Resolved POA    Hyperchloremic metabolic acidosis ASD-41-SJ: E87.2  ICD-9-CM: 276.2  3/13/2022 - Present Unknown        E. coli UTI (urinary tract infection) ICD-10-CM: N39.0, B96.20  ICD-9-CM: 599.0, 041.49  3/12/2022 - Present Unknown        Acute on chronic anemia ICD-10-CM: D64.9  ICD-9-CM: 285.9  3/12/2022 - Present Unknown        Sepsis ([facility] [address] ICD-10-CM: A41.9  ICD-9-CM: 038.9, 995.91  3/10/2022 - Present Unknown        Adult failure to thrive ICD-10-CM: R62.7  ICD-9-CM: 783.7  3/7/2022 - Present Unknown        Acute kidney injury superimposed on chronic kidney disease ([facility] [address] ICD-10-CM: N17.9, N18.9  ICD-9-CM: 866.00, 585.9  3/7/2022 - Present Unknown        * (Principal) Humerus fracture ICD-10-CM: S42.309A  ICD-9-CM: 812.20  3/6/2022 - Present Unknown        Fall ICD-10-CM: W19. Merline Alfaro  ICD-9-CM: R593.6  3/6/2022 - Present Unknown        Coronary artery disease involving native coronary artery of native heart without angina pectoris ICD-10-CM: I25.10  ICD-9-CM: 414.01  5/31/2017 - Present Yes        Essential hypertension ICD-10-CM: I10  ICD-9-CM: 401.9  5/4/2017 - Present Yes        Type 2 diabetes mellitus ([facility] ICD-10-CM: E11.9  ICD-9-CM: 250.00  4/5/2017 - Present Yes    Overview Signed 4/5/2017  2:43 PM by Nicole TEAGUE     Last Assessment & Plan:   Check hemoglobin A1c. SSI with FSBG checks qAC and qhs. Adjust insulin as needed based on FSBG results. Diabetic diet and education as needed. Hypoactive thyroid ICD-10-CM: E03.9  ICD-9-CM: 244.9  4/5/2017 - Present Yes    Overview Signed 4/5/2017  2:43 PM by Nicole TEAGUE     Last Assessment & Plan:   Continue home thyroid replacement meds. Check TSH in the AM and consider med adjustments based on results. Class 2 severe obesity due to excess calories with serious comorbidity in [facility]) ICD-10-CM: E66.01  ICD-9-CM: 278.01  4/5/2017 - Present Yes        Diabetic polyneuropathy ([facility] ICD-10-CM: E11.42  ICD-9-CM: 250.60, 357.2  2/1/2017 - Present Yes    Overview Signed 4/5/2017  2:43 PM by Nicole TEAGUE     Last Assessment & Plan:   The patient's clinical history and presentation is more consistent with a diabetic neuropathy that is new onset in this patient with long-standing diabetes mellitus type 2 and diabetic nephropathy. I will start her on Lyrica therapy starting today. Physical therapy to evaluate for mobility on this drug. We will continue to follow closely. No clinical signs or symptoms of gout or cellulitis. Chronic kidney disease ICD-10-CM: N18.9  ICD-9-CM: 585.9  2/18/2016 - Present Yes    Overview Signed 4/5/2017  2:43 PM by Nicole ETAGUE     Last Assessment & Plan: This is likely from diabetic nephropathy and long-standing diabetes mellitus type 2  Continue current care and give IV fluids overnight. Repeat BMP in the morning.                    Objective:     Patient Vitals for the past 24 hrs:   Temp Pulse Resp BP SpO2   03/13/22 1138 98.3 °F (36.8 °C) 70 17 115/61 96 %   03/13/22 0725 99 °F (37.2 °C) 85 19 131/75 96 %   03/13/22 0343 98 °F (36.7 °C) 84 20 103/68 94 % 03/12/22 2243 98.2 °F (36.8 °C) 85 20 134/75 95 %   03/12/22 1910 98.1 °F (36.7 °C) 82 20 (!) 149/68 94 %   03/12/22 1536 97.5 °F (36.4 °C) 77 23 138/62 97 %     Oxygen Therapy  O2 Sat (%): 96 % (03/13/22 1138)  Pulse via Oximetry: 67 beats per minute (03/10/22 0348)  O2 Device: None (Room air) (03/13/22 0905)    Estimated body mass index is 37.18 kg/m² as calculated from the following:    Height as of this encounter: 5' 1.5\" (1.562 m). Weight as of this encounter: 90.7 kg (200 lb). Intake/Output Summary (Last 24 hours) at 3/13/2022 1313  Last data filed at 3/13/2022 1305  Gross per 24 hour   Intake 4128.1 ml   Output 1550 ml   Net 2578.1 ml         Physical Exam:   Blood pressure 115/61, pulse 70, temperature 98.3 °F (36.8 °C), resp. rate 17, height 5' 1.5\" (1.562 m), weight 90.7 kg (200 lb), SpO2 96 %. General:    Frail, alert, conversational, in no acute distress  Head:  Normocephalic, atraumatic  Eyes:  Sclerae appear normal.  Pupils equally round. ENT:  Nares appear normal, no drainage. Moist oral mucosa  Neck:  No restricted ROM. Trachea midline   CV:   RRR.  + Murmur. No jugular venous distension. Lungs:   CTAB, diminished bilateral bases. No wheezing, rhonchi, or rales. Respirations even, unlabored on RA  Abdomen: Bowel sounds present. Soft, nontender, nondistended. Extremities: No cyanosis or clubbing. No edema. LUE with sling intact/sensation and  strength intact  Skin:     No rashes. Warm and dry. Pale coloration. Neuro:  CN II-XII grossly intact. Sensation intact. A&Ox3. Moves all 4 extremities. Follows commands. No focal deficits. Psych:  Normal mood and affect.       I have reviewed ordered lab tests and independently visualized imaging below:    Recent Labs:  Recent Results (from the past 48 hour(s))   GLUCOSE, POC    Collection Time: 03/11/22  4:36 PM   Result Value Ref Range    Glucose (POC) 140 (H) 65 - 100 mg/dL    Performed by [facility]    GLUCOSE, POC Collection Time: 03/11/22  9:12 PM   Result Value Ref Range    Glucose (POC) 141 (H) 65 - 100 mg/dL    Performed by [facility]    CBC WITH AUTOMATED DIFF    Collection Time: 03/12/22  7:17 AM   Result Value Ref Range    WBC 14.1 (H) 4.3 - 11.1 K/uL    RBC 2.66 (L) 4.05 - 5.2 M/uL    HGB 7.8 (L) 11.7 - 15.4 g/dL    HCT 25.2 (L) 35.8 - 46.3 %    MCV 94.7 79.6 - 97.8 FL    MCH 29.3 26.1 - 32.9 PG    MCHC 31.0 (L) 31.4 - 35.0 g/dL    RDW 14.4 11.9 - 14.6 %    PLATELET 907 408 - 179 K/uL    MPV 9.8 9.4 - 12.3 FL    ABSOLUTE NRBC 0.00 0.0 - 0.2 K/uL    DF AUTOMATED      NEUTROPHILS 74 43 - 78 %    LYMPHOCYTES 14 13 - 44 %    MONOCYTES 8 4.0 - 12.0 %    EOSINOPHILS 2 0.5 - 7.8 %    BASOPHILS 0 0.0 - 2.0 %    IMMATURE GRANULOCYTES 1 0.0 - 5.0 %    ABS. NEUTROPHILS 10.4 (H) 1.7 - 8.2 K/UL    ABS. LYMPHOCYTES 2.0 0.5 - 4.6 K/UL    ABS. MONOCYTES 1.1 0.1 - 1.3 K/UL    ABS. EOSINOPHILS 0.3 0.0 - 0.8 K/UL    ABS. BASOPHILS 0.0 0.0 - 0.2 K/UL    ABS. IMM.  GRANS. 0.2 0.0 - 0.5 K/UL   METABOLIC PANEL, BASIC    Collection Time: 03/12/22  7:17 AM   Result Value Ref Range    Sodium 140 136 - 145 mmol/L    Potassium 3.4 (L) 3.5 - 5.1 mmol/L    Chloride 113 (H) 98 - 107 mmol/L    CO2 19 (L) 21 - 32 mmol/L    Anion gap 8 7 - 16 mmol/L    Glucose 117 (H) 65 - 100 mg/dL    BUN 18 8 - 23 MG/DL    Creatinine 1.00 0.6 - 1.0 MG/DL    GFR est AA >60 >60 ml/min/1.73m2    GFR est non-AA 56 (L) >60 ml/min/1.73m2    Calcium 8.2 (L) 8.3 - 10.4 MG/DL   VANCOMYCIN, RANDOM    Collection Time: 03/12/22  7:17 AM   Result Value Ref Range    Vancomycin, random 18.4 UG/ML   PROCALCITONIN    Collection Time: 03/12/22  7:17 AM   Result Value Ref Range    Procalcitonin 0.12 0.00 - 0.49 ng/mL   GLUCOSE, POC    Collection Time: 03/12/22  7:21 AM   Result Value Ref Range    Glucose (POC) 123 (H) 65 - 100 mg/dL    Performed by [employer]    GLUCOSE, POC    Collection Time: 03/12/22 11:27 AM   Result Value Ref Range    Glucose (POC) 155 (H) 65 - 100 mg/dL    Performed by [facility]    GLUCOSE, POC    Collection Time: 03/12/22  4:45 PM   Result Value Ref Range    Glucose (POC) 118 (H) 65 - 100 mg/dL    Performed by [employer]    GLUCOSE, POC    Collection Time: 03/12/22  8:59 PM   Result Value Ref Range    Glucose (POC) 112 (H) 65 - 100 mg/dL    [facility] by [facility]    CBC WITH AUTOMATED DIFF    Collection Time: 03/13/22  7:07 AM   Result Value Ref Range    WBC 13.1 (H) 4.3 - 11.1 K/uL    RBC 2.73 (L) 4.05 - 5.2 M/uL    HGB 8.0 (L) 11.7 - 15.4 g/dL    HCT 26.0 (L) 35.8 - 46.3 %    MCV 95.2 79.6 - 97.8 FL    MCH 29.3 26.1 - 32.9 PG    MCHC 30.8 (L) 31.4 - 35.0 g/dL    RDW 14.4 11.9 - 14.6 %    PLATELET 079 696 - 202 K/uL    MPV 10.0 9.4 - 12.3 FL    ABSOLUTE NRBC 0.00 0.0 - 0.2 K/uL    DF AUTOMATED      NEUTROPHILS 68 43 - 78 %    LYMPHOCYTES 20 13 - 44 %    MONOCYTES 9 4.0 - 12.0 %    EOSINOPHILS 2 0.5 - 7.8 %    BASOPHILS 0 0.0 - 2.0 %    IMMATURE GRANULOCYTES 1 0.0 - 5.0 %    ABS. NEUTROPHILS 8.9 (H) 1.7 - 8.2 K/UL    ABS. LYMPHOCYTES 2.6 0.5 - 4.6 K/UL    ABS. MONOCYTES 1.1 0.1 - 1.3 K/UL    ABS. EOSINOPHILS 0.3 0.0 - 0.8 K/UL    ABS. BASOPHILS 0.0 0.0 - 0.2 K/UL    ABS. IMM.  GRANS. 0.2 0.0 - 0.5 K/UL   METABOLIC PANEL, BASIC    Collection Time: 03/13/22  7:07 AM   Result Value Ref Range    Sodium 140 136 - 145 mmol/L    Potassium 3.6 3.5 - 5.1 mmol/L    Chloride 113 (H) 98 - 107 mmol/L    CO2 20 (L) 21 - 32 mmol/L    Anion gap 7 7 - 16 mmol/L    Glucose 103 (H) 65 - 100 mg/dL    BUN 13 8 - 23 MG/DL    Creatinine 1.10 (H) 0.6 - 1.0 MG/DL    GFR est AA >60 >60 ml/min/1.73m2    GFR est non-AA 50 (L) >60 ml/min/1.73m2    Calcium 8.5 8.3 - 10.4 MG/DL   GLUCOSE, POC    Collection Time: 03/13/22  7:24 AM   Result Value Ref Range    Glucose (POC) 115 (H) 65 - 100 mg/dL    Performed by [facility]    GLUCOSE, POC    Collection Time: 03/13/22 11:39 AM   Result Value Ref Range    Glucose (POC) 155 (H) 65 - 100 mg/dL    Performed by [facility]        All Micro Results     Procedure Component Value Units Date/Time    CULTURE, BLOOD [[id number]] Collected: 03/07/22 0929    Order Status: Completed Specimen: Blood Updated: 03/12/22 0915     Special Requests: --        RIGHT  FOREARM       Culture result: NO GROWTH 5 DAYS       CULTURE, BLOOD [[id number]] Collected: 03/07/22 0919    Order Status: Completed Specimen: Blood Updated: 03/12/22 0915     Special Requests: --        RIGHT  HAND       Culture result: NO GROWTH 5 DAYS       CULTURE, URINE [[id number]]  (Abnormal)  (Susceptibility) Collected: 03/06/22 2235    Order Status: Completed Specimen: Urine from Clean catch Updated: 03/09/22 3210     Special Requests: NO SPECIAL REQUESTS        Culture result:       >100,000 COLONIES/mL ESCHERICHIA COLI                Other Studies:  No results found.     Current Meds:  Current Facility-Administered Medications   Medication Dose Route Frequency    sodium bicarbonate tablet 650 mg  650 mg Oral BID    cefTRIAXone (ROCEPHIN) 1 g in 0.9% sodium chloride (MBP/ADV) 50 mL MBP  1 g IntraVENous Q24H    0.9% sodium chloride infusion  50 mL/hr IntraVENous CONTINUOUS    nystatin (MYCOSTATIN) 100,000 unit/gram powder   Topical BID    zinc oxide-cod liver oil (DESITIN) 40 % paste   Topical PRN    [Held by provider] enoxaparin (LOVENOX) injection 40 mg  40 mg SubCUTAneous DAILY    sodium chloride (NS) flush 5-40 mL  5-40 mL IntraVENous Q8H    sodium chloride (NS) flush 5-40 mL  5-40 mL IntraVENous PRN    acetaminophen (TYLENOL) tablet 650 mg  650 mg Oral Q6H PRN    Or    acetaminophen (TYLENOL) suppository 650 mg  650 mg Rectal Q6H PRN    polyethylene glycol (MIRALAX) packet 17 g  17 g Oral DAILY PRN    ondansetron (ZOFRAN ODT) tablet 4 mg  4 mg Oral Q8H PRN    Or    ondansetron (ZOFRAN) injection 4 mg  4 mg IntraVENous Q6H PRN    insulin lispro (HUMALOG) injection   SubCUTAneous AC&HS    [Held by provider] amLODIPine (NORVASC) tablet 5 mg  5 mg Oral DAILY    aspirin delayed-release tablet 81 mg  81 mg Oral DAILY    cetirizine (ZYRTEC) tablet 10 mg  10 mg Oral DAILY PRN    dorzolamide-timoloL (COSOPT) 22.3-6.8 mg/mL ophthalmic solution 1 Drop  1 Drop Both Eyes BID    [Held by provider] furosemide (LASIX) tablet 20 mg  20 mg Oral DAILY    latanoprost (XALATAN) 0.005 % ophthalmic solution 1 Drop  1 Drop Both Eyes QHS    levothyroxine (SYNTHROID) tablet 50 mcg  50 mcg Oral ACB    [Held by provider] losartan (COZAAR) tablet 100 mg  100 mg Oral DAILY    [Held by provider] metoprolol succinate (TOPROL-XL) XL tablet 100 mg  100 mg Oral DAILY    pantoprazole (PROTONIX) tablet 40 mg  40 mg Oral ACB    pregabalin (LYRICA) capsule 50 mg  50 mg Oral BID    rosuvastatin (CRESTOR) tablet 20 mg  20 mg Oral QHS    HYDROcodone-acetaminophen (NORCO) 7.5-325 mg per tablet 1 Tablet  1 Tablet Oral Q6H PRN     Labs, vital signs, diagnostic imaging reviewed. Case discussed with patient, care team, Dr. Tomasa Connell.     Signed:  Dede Spain NP

## 2022-03-14 VITALS
HEIGHT: 62 IN | RESPIRATION RATE: 19 BRPM | HEART RATE: 81 BPM | SYSTOLIC BLOOD PRESSURE: 133 MMHG | OXYGEN SATURATION: 97 % | DIASTOLIC BLOOD PRESSURE: 68 MMHG | WEIGHT: 200 LBS | TEMPERATURE: 98.3 F | BODY MASS INDEX: 36.8 KG/M2

## 2022-03-14 PROBLEM — E61.1 IRON DEFICIENCY: Status: ACTIVE | Noted: 2022-03-14

## 2022-03-14 LAB
ALBUMIN SERPL-MCNC: 1.6 G/DL (ref 3.2–4.6)
ALBUMIN/GLOB SERPL: 0.5 {RATIO} (ref 1.2–3.5)
ALP SERPL-CCNC: 108 U/L (ref 50–136)
ALT SERPL-CCNC: 18 U/L (ref 12–65)
ANION GAP SERPL CALC-SCNC: 7 MMOL/L (ref 7–16)
APPEARANCE UR: CLEAR
AST SERPL-CCNC: 30 U/L (ref 15–37)
BACTERIA URNS QL MICRO: ABNORMAL /HPF
BASOPHILS # BLD: 0 K/UL (ref 0–0.2)
BASOPHILS NFR BLD: 0 % (ref 0–2)
BILIRUB SERPL-MCNC: 0.3 MG/DL (ref 0.2–1.1)
BILIRUB UR QL: NEGATIVE
BUN SERPL-MCNC: 15 MG/DL (ref 8–23)
CALCIUM SERPL-MCNC: 8.2 MG/DL (ref 8.3–10.4)
CHLORIDE SERPL-SCNC: 114 MMOL/L (ref 98–107)
CO2 SERPL-SCNC: 21 MMOL/L (ref 21–32)
COLOR UR: YELLOW
COVID-19 RAPID TEST, COVR: NOT DETECTED
CREAT SERPL-MCNC: 1.1 MG/DL (ref 0.6–1)
DIFFERENTIAL METHOD BLD: ABNORMAL
EOSINOPHIL # BLD: 0.3 K/UL (ref 0–0.8)
EOSINOPHIL NFR BLD: 3 % (ref 0.5–7.8)
EPI CELLS #/AREA URNS HPF: ABNORMAL /HPF
ERYTHROCYTE [DISTWIDTH] IN BLOOD BY AUTOMATED COUNT: 14.5 % (ref 11.9–14.6)
FERRITIN SERPL-MCNC: 447 NG/ML (ref 8–388)
GLOBULIN SER CALC-MCNC: 3.5 G/DL (ref 2.3–3.5)
GLUCOSE BLD STRIP.AUTO-MCNC: 110 MG/DL (ref 65–100)
GLUCOSE BLD STRIP.AUTO-MCNC: 142 MG/DL (ref 65–100)
GLUCOSE SERPL-MCNC: 99 MG/DL (ref 65–100)
GLUCOSE UR STRIP.AUTO-MCNC: NEGATIVE MG/DL
HCT VFR BLD AUTO: 24.9 % (ref 35.8–46.3)
HGB BLD-MCNC: 7.6 G/DL (ref 11.7–15.4)
HGB UR QL STRIP: ABNORMAL
IMM GRANULOCYTES # BLD AUTO: 0.1 K/UL (ref 0–0.5)
IMM GRANULOCYTES NFR BLD AUTO: 1 % (ref 0–5)
IRON SATN MFR SERPL: 11 %
IRON SERPL-MCNC: 15 UG/DL (ref 35–150)
KETONES UR QL STRIP.AUTO: NEGATIVE MG/DL
LEUKOCYTE ESTERASE UR QL STRIP.AUTO: ABNORMAL
LYMPHOCYTES # BLD: 2.6 K/UL (ref 0.5–4.6)
LYMPHOCYTES NFR BLD: 21 % (ref 13–44)
MAGNESIUM SERPL-MCNC: 1.9 MG/DL (ref 1.8–2.4)
MCH RBC QN AUTO: 28.6 PG (ref 26.1–32.9)
MCHC RBC AUTO-ENTMCNC: 30.5 G/DL (ref 31.4–35)
MCV RBC AUTO: 93.6 FL (ref 79.6–97.8)
MONOCYTES # BLD: 1.1 K/UL (ref 0.1–1.3)
MONOCYTES NFR BLD: 9 % (ref 4–12)
NEUTS SEG # BLD: 8.2 K/UL (ref 1.7–8.2)
NEUTS SEG NFR BLD: 66 % (ref 43–78)
NITRITE UR QL STRIP.AUTO: NEGATIVE
NRBC # BLD: 0 K/UL (ref 0–0.2)
OTHER OBSERVATIONS,UCOM: ABNORMAL
PH UR STRIP: 5.5 [PH] (ref 5–9)
PLATELET # BLD AUTO: 311 K/UL (ref 150–450)
PMV BLD AUTO: 10 FL (ref 9.4–12.3)
POTASSIUM SERPL-SCNC: 3.2 MMOL/L (ref 3.5–5.1)
PROT SERPL-MCNC: 5.1 G/DL (ref 6.3–8.2)
PROT UR STRIP-MCNC: NEGATIVE MG/DL
RBC # BLD AUTO: 2.66 M/UL (ref 4.05–5.2)
RBC #/AREA URNS HPF: ABNORMAL /HPF
SERVICE CMNT-IMP: ABNORMAL
SERVICE CMNT-IMP: ABNORMAL
SODIUM SERPL-SCNC: 142 MMOL/L (ref 136–145)
SOURCE, COVRS: NORMAL
SP GR UR REFRACTOMETRY: 1 (ref 1–1.02)
TIBC SERPL-MCNC: 138 UG/DL (ref 250–450)
UROBILINOGEN UR QL STRIP.AUTO: 0.2 EU/DL (ref 0.2–1)
WBC # BLD AUTO: 12.3 K/UL (ref 4.3–11.1)
WBC URNS QL MICRO: ABNORMAL /HPF

## 2022-03-14 PROCEDURE — 85025 COMPLETE CBC W/AUTO DIFF WBC: CPT

## 2022-03-14 PROCEDURE — 87635 SARS-COV-2 COVID-19 AMP PRB: CPT

## 2022-03-14 PROCEDURE — 77030038269 HC DRN EXT URIN PURWCK BARD -A

## 2022-03-14 PROCEDURE — 80053 COMPREHEN METABOLIC PANEL: CPT

## 2022-03-14 PROCEDURE — 74011250637 HC RX REV CODE- 250/637: Performed by: INTERNAL MEDICINE

## 2022-03-14 PROCEDURE — 2709999900 HC NON-CHARGEABLE SUPPLY

## 2022-03-14 PROCEDURE — 74011250637 HC RX REV CODE- 250/637: Performed by: EMERGENCY MEDICINE

## 2022-03-14 PROCEDURE — 74011000250 HC RX REV CODE- 250: Performed by: INTERNAL MEDICINE

## 2022-03-14 PROCEDURE — 97112 NEUROMUSCULAR REEDUCATION: CPT

## 2022-03-14 PROCEDURE — 97535 SELF CARE MNGMENT TRAINING: CPT

## 2022-03-14 PROCEDURE — 74011250637 HC RX REV CODE- 250/637: Performed by: STUDENT IN AN ORGANIZED HEALTH CARE EDUCATION/TRAINING PROGRAM

## 2022-03-14 PROCEDURE — 82962 GLUCOSE BLOOD TEST: CPT

## 2022-03-14 PROCEDURE — 83735 ASSAY OF MAGNESIUM: CPT

## 2022-03-14 PROCEDURE — 36415 COLL VENOUS BLD VENIPUNCTURE: CPT

## 2022-03-14 PROCEDURE — 82728 ASSAY OF FERRITIN: CPT

## 2022-03-14 PROCEDURE — 97530 THERAPEUTIC ACTIVITIES: CPT

## 2022-03-14 PROCEDURE — 83540 ASSAY OF IRON: CPT

## 2022-03-14 RX ORDER — CEFPODOXIME PROXETIL 200 MG/1
200 TABLET, FILM COATED ORAL 2 TIMES DAILY
Qty: 6 TABLET | Refills: 0 | Status: SHIPPED | OUTPATIENT
Start: 2022-03-14 | End: 2022-03-17

## 2022-03-14 RX ORDER — LANOLIN ALCOHOL/MO/W.PET/CERES
325 CREAM (GRAM) TOPICAL 2 TIMES DAILY WITH MEALS
Qty: 60 TABLET | Refills: 0 | Status: SHIPPED | OUTPATIENT
Start: 2022-03-14 | End: 2022-04-13

## 2022-03-14 RX ORDER — POTASSIUM CHLORIDE 750 MG/1
10 TABLET, EXTENDED RELEASE ORAL DAILY
Qty: 7 TABLET | Refills: 0 | Status: SHIPPED | OUTPATIENT
Start: 2022-03-14 | End: 2022-03-21

## 2022-03-14 RX ADMIN — SODIUM CHLORIDE, PRESERVATIVE FREE 10 ML: 5 INJECTION INTRAVENOUS at 05:33

## 2022-03-14 RX ADMIN — LEVOTHYROXINE SODIUM 50 MCG: 0.05 TABLET ORAL at 05:33

## 2022-03-14 RX ADMIN — NYSTATIN: 100000 POWDER TOPICAL at 08:26

## 2022-03-14 RX ADMIN — DORZOLAMIDE HYDROCHLORIDE AND TIMOLOL MALEATE 1 DROP: 20; 5 SOLUTION/ DROPS OPHTHALMIC at 08:26

## 2022-03-14 RX ADMIN — ASPIRIN 81 MG: 81 TABLET ORAL at 08:26

## 2022-03-14 RX ADMIN — ACETAMINOPHEN 650 MG: 325 TABLET ORAL at 01:14

## 2022-03-14 RX ADMIN — ACETAMINOPHEN 650 MG: 325 TABLET ORAL at 08:31

## 2022-03-14 RX ADMIN — PREGABALIN 50 MG: 50 CAPSULE ORAL at 08:26

## 2022-03-14 RX ADMIN — PANTOPRAZOLE SODIUM 40 MG: 40 TABLET, DELAYED RELEASE ORAL at 05:33

## 2022-03-14 RX ADMIN — BISACODYL 10 MG: 10 SUPPOSITORY RECTAL at 08:26

## 2022-03-14 NOTE — PROGRESS NOTES
Chart reviewed by [facility] and discussed in IDR. Patient with bed acceptance to [facility] room 324A, pending occult stool. HGB 7.6. CM following.

## 2022-03-14 NOTE — PROGRESS NOTES
Patient with discharge orders today. RNCM notified patient's niece, Iwona Dockery to update on bed assignment at [facility]/[place], room 328A, report line [phone] Patient and family in agreement with SNF acceptance. No further supportive needs identified. Transportation provided by [employer], via stretcher; scheduled  at 1600. Patient has met all treatment goals and milestones. CM following until discharged. Care Management Interventions  PCP Verified by CM:  Yes  Transition of Care Consult (CM Consult): Discharge Planning  Discharge Durable Medical Equipment: No  Physical Therapy Consult: Yes  Occupational Therapy Consult: Yes  Speech Therapy Consult: No  Support Systems: Child(johanny)  The Plan for Transition of Care is Related to the Following Treatment Goals : return to baseline by participating in skilled therapies  The Patient and/or Patient Representative was Provided with a Choice of Provider and Agrees with the Discharge Plan?: Yes  Freedom of Choice List was Provided with Basic Dialogue that Supports the Patient's Individualized Plan of Care/Goals, Treatment Preferences and Shares the Quality Data Associated with the Providers?: Yes  [place] Resource Information Provided?: No  Discharge Location  Patient Expects to be Discharged to[de-identified] Skilled nursing facility

## 2022-03-14 NOTE — DISCHARGE SUMMARY
Hospitalist Discharge Summary   Admit Date:  3/6/2022  8:22 PM   DC Note date: 3/14/2022  Name:  Saintclair Pigeon   Age:  78 y.o. Sex:  female  :  1940   MRN:  [id number]   Room:  [facility]  PCP:  Breanne Yun MD    Presenting Complaint: Fall and Head Injury    Initial Admission Diagnosis: Fall [W19. XXXA]  Humerus fracture [S42.309A]  UTI (urinary tract infection) [N39.0]     Problem List for this Hospitalization:  Hospital Problems as of 3/14/2022 Date Reviewed: 2021          Codes Class Noted - Resolved POA    Iron deficiency ICD-10-CM: E61.1  ICD-9-CM: 280.9  3/14/2022 - Present Unknown        Hyperchloremic metabolic acidosis MOP-26-SC: E87.2  ICD-9-CM: 276.2  3/13/2022 - Present Unknown        E. coli UTI (urinary tract infection) ICD-10-CM: N39.0, B96.20  ICD-9-CM: 599.0, 041.49  3/12/2022 - Present Unknown        Acute on chronic anemia ICD-10-CM: D64.9  ICD-9-CM: 285.9  3/12/2022 - Present Unknown        Sepsis ([facility] ICD-10-CM: A41.9  ICD-9-CM: 038.9, 995.91  3/10/2022 - Present Unknown        Adult failure to thrive ICD-10-CM: R62.7  ICD-9-CM: 783.7  3/7/2022 - Present Unknown        Acute kidney injury superimposed on chronic kidney disease ([facility] ICD-10-CM: N17.9, N18.9  ICD-9-CM: 866.00, 585.9  3/7/2022 - Present Unknown        * (Principal) Humerus fracture ICD-10-CM: S42.309A  ICD-9-CM: 812.20  3/6/2022 - Present Unknown        Fall ICD-10-CM: W19. Kristine Alexg  ICD-9-CM: E888.9  3/6/2022 - Present Unknown        Coronary artery disease involving native coronary artery of native heart without angina pectoris ICD-10-CM: I25.10  ICD-9-CM: 414.01  2017 - Present Yes        Essential hypertension ICD-10-CM: I10  ICD-9-CM: 401.9  2017 - Present Yes        Type 2 diabetes mellitus ([facility] ICD-10-CM: E11.9  ICD-9-CM: 250.00  2017 - Present Yes    Overview Signed 2017  2:43 PM by Amalia TEAGUE     Last Assessment & Plan:   Check hemoglobin A1c.   SSI with FSBG checks qAC and qhs.  Adjust insulin as needed based on FSBG results. Diabetic diet and education as needed. Hypoactive thyroid ICD-10-CM: E03.9  ICD-9-CM: 244.9  4/5/2017 - Present Yes    Overview Signed 4/5/2017  2:43 PM by Ga TEAGUE     Last Assessment & Plan:   Continue home thyroid replacement meds. Check TSH in the AM and consider med adjustments based on results. Class 2 severe obesity due to excess calories with serious comorbidity in adult [facility]) ICD-10-CM: E66.01  ICD-9-CM: 278.01  4/5/2017 - Present Yes        Diabetic polyneuropathy ([facility] ICD-10-CM: E11.42  ICD-9-CM: 250.60, 357.2  2/1/2017 - Present Yes    Overview Signed 4/5/2017  2:43 PM by Ga TEAGUE     Last Assessment & Plan:   The patient's clinical history and presentation is more consistent with a diabetic neuropathy that is new onset in this patient with long-standing diabetes mellitus type 2 and diabetic nephropathy. I will start her on Lyrica therapy starting today. Physical therapy to evaluate for mobility on this drug. We will continue to follow closely. No clinical signs or symptoms of gout or cellulitis. Chronic kidney disease ICD-10-CM: N18.9  ICD-9-CM: 585.9  2/18/2016 - Present Yes    Overview Signed 4/5/2017  2:43 PM by Ga TEAGUE     Last Assessment & Plan: This is likely from diabetic nephropathy and long-standing diabetes mellitus type 2  Continue current care and give IV fluids overnight. Repeat BMP in the morning. Did Patient have Sepsis (YES OR NO): YES    Hospital Course:  Patient is an 77 y/o female with PMH hypertension, hypothyroidism, CAD, chronic LBBB, AV Sclerosis, DM II who presented to ED s/p mechanical fall. Patient lives alone, uses a walker, and fell on Thursday 3/3. She was found by family member 2.5-3 hours later who subsequently called medic who helped patient from the ground but did not feel her arm was broken.  She was unable to use her walker thereafter, and family could no longer care for her. They brought her to ED requesting rehab placement. ED workup revealed left proximal humeral fracture. CT head negative. Patient also met sepsis criteria with UTI. She was admitted by hospitalist team for further management of sepsis and left humerus fracture. Ortho evaluated, manage non-operatively with LUE sling, outpatient follow-up in 2 weeks. PT/OT evaluations with STR recommendations. Placement obtained at [facility] with discharge planned to facility today, 3/14. Left Humerus fracture  -s/p mechanical fall at home 3/3  -Ortho evaluated 3/7, manage non-operatively with LUE sling  -Outpatient ortho follow-up in 2 weeks     Sepsis ([address] present on admission, resolved  -Criteria on admission: leukocytosis, febrile, LA 1.5  -s/p 1L IVF bolus, it appears Ceftriaxone was ordered but not initiated on admission  -BCx 2/7 finalized negative  -Procal 0.55 --> 0.12  -Ceftriaxone 3/8-3/10, 3/12-3/13 for UTI  -Vancomycin 3/10-3/12 for persistent leukocytosis and hypotension  -CXR obtained 3/7 and 3/10 negative   -LA resolved, remains afebrile, procal improved, hypotension resolved, leukocytosis continues to trend down  -Appears to have persistent mild leukocytosis at baseline per chart review, recommend PCP consider Hematologist referral for further evaluation     UTI (urinary tract infection), resolved  -UCx with pan-sensitive E.  Coli, Ceftriaxone 3/8-3/10, 3/12-3/13 to complete 5 day course  -Repeat UA 3/13 with resolving infection, will send additional 3-day course Cefpodoxime at discharge, noted PCN allergy, patient tolerated Ceftriaxone without adverse reaction     Acute kidney injury superimposed on chronic kidney disease ([address] resolved  -Renal function stable at baseline at time of discharge     Diabetic polyneuropathy ([address]   -Continue Pregabalin     Diabetes mellitus II ([address]   -Resume home Glipizide at discharge     Hypothyroidism  -Continue Synthroid     Class 2 severe obesity due to excess calories with serious comorbidity in adult [facility])   -Encourage diet and lifestyle modifications     Essential hypertension  -BB/CCB/ARB held 3/10 for hypotension  -Continue BB and Lasix at discharge / Hold CCB and ARB until PCP follow-up with BP re-evaluation     Hyperchloremic Metabolic Acidosis, resolved  -Likely secondary to IVF resuscitation/diffusion     CAD involving native coronary artery of native heart without angina pectoris   -Continue Rosuvastatin, BB, ASA at discharge / Hold ARB until PCP follow-up with BP re-evaluation     Chronic LBBB  AV Sclerosis  -noted, Follows Dr. Edda Ramirez outpatient, surveillance Echos     Frequent Falls  Adult failure to thrive   -Current presentation due to fall, also noted with fall early March evaluated by PCP  -PT/OT while inpatient, STR at discharge     Hypokalemia  -Received replacement, continue low-dose supplementation at discharge  -PCP follow-up 3-5 days     Coccyx Wound  -Wound nurse evaluated 3/7, continue silicone foam dressing, frequent turns     Acute on chronic Anemia  Iron deficiency   -Noted with hx of iron deficiency anemia 2006 and anemia of CKD, on low-dose Aranesp in the past, low B12 2016 requiring supplementation  -Hgb drop from 10.6 to 7.9 but with no overt signs of bleeding  -Lovenox/ASA held, avoid NSAID's  -Anemia studies with continued iron deficiency / Vit B12 & Folate wnl  -Occult stool ordered but was not obtained  -Hgb 7.6 at time of discharge, has remained stable now for 5 days, initiate BID iron supplementation x 30 days, then continue daily thereafter, okay to resume ASA, continue to avoid NSAID's  -PCP follow-up 3-5 days     Constipation, resolved  -Patient with large BM 3/13    Disposition: Rehab Facility  Diet: ADULT DIET Easy to Chew; 4 carb choices (60 gm/meal); Low Fat/Low Chol/High Fiber/2 gm Na  Code Status: Full Code    Follow Up Orders:   Follow-up Appointments   Procedures    FOLLOW UP VISIT Appointment in: Two Weeks Call  [phone] for appointment     Call  [phone] for appointment     Standing Status:   Standing     Number of Occurrences:   1     Order Specific Question:   Appointment in     Answer: Two Weeks    FOLLOW UP VISIT Appointment in: Other (Specify) PCP 3-5 days Ortho 2 weeks as ordered     PCP 3-5 days  Ortho 2 weeks as ordered     Standing Status:   Standing     Number of Occurrences:   1     Order Specific Question:   Appointment in     Answer: Other (Specify)       Follow-up Information     Follow up With Specialties Details Why Contact Info    [address] [facility] Barbara Barrera) [facility]   [id number]  [address] Berkley Davies MD Family Medicine  WILL SEE [facility] DR Eduardo Martin PCP WHEN DISCHARGED AS NEEDED [address], [place], [place] Orthopedic Surgery, Physician Assistant On 3/29/2022 11:00  WILL SEE DR [address]  [address] [place] [address]  [phone]            Follow up labs/diagnostics (ultimately defer to outpatient provider):  CBC / BMP / Magnesium 3-5 days    Time spent in patient discharge and coordination 35 minutes. Plan was discussed with patient, care team, Dr. Maynor Bernard. All questions answered. Patient was stable at time of discharge. Instructions given to call a physician or return if any concerns. Discharge Info:   Current Discharge Medication List      START taking these medications    Details   cefpodoxime (VANTIN) 200 mg tablet Take 1 Tablet by mouth two (2) times a day for 3 days. Indications: urinary tract infection due to E. coli bacteria  Qty: 6 Tablet, Refills: 0  Start date: 3/14/2022, End date: 3/17/2022      ferrous sulfate 325 mg (65 mg iron) tablet Take 1 Tablet by mouth two (2) times daily (with meals) for 30 days.   Qty: 60 Tablet, Refills: 0  Start date: 3/14/2022, End date: 4/13/2022      potassium chloride (KLOR-CON M10) 10 mEq tablet Take 1 Tablet by mouth daily for 7 days. Qty: 7 Tablet, Refills: 0  Start date: 3/14/2022, End date: 3/21/2022         CONTINUE these medications which have NOT CHANGED    Details   metoprolol succinate (TOPROL-XL) 100 mg tablet Take 1 Tablet by mouth daily. Qty: 90 Tablet, Refills: 3      furosemide (LASIX) 20 mg tablet Take 1 Tablet by mouth daily. Qty: 90 Tablet, Refills: 3      cetirizine (ZYRTEC) 10 mg tablet Take 10 mg by mouth daily as needed. acetaminophen (TYLENOL) 500 mg tablet Take 500 mg by mouth every six (6) hours as needed for Pain. rosuvastatin (CRESTOR) 20 mg tablet Take 1 Tab by mouth nightly. Qty: 90 Tab, Refills: 3      omeprazole (PRILOSEC) 10 mg capsule Take 10 mg by mouth daily. latanoprost (XALATAN) 0.005 % ophthalmic solution Administer 1 Drop to both eyes nightly. dorzolamide HCl/timolol maleat (DORZOLAMIDE-TIMOLOL OP) Apply 1 Drop to eye two (2) times a day. Both eyes      pregabalin (LYRICA) 50 mg capsule Take 50 mg by mouth two (2) times a day. ascorbic acid, vitamin C, (VITAMIN C) 500 mg tablet Take 500 mg by mouth daily. Occasionally      diphenhydrAMINE (BENADRYL ALLERGY) 25 mg tablet Take 25 mg by mouth every six (6) hours as needed for Sleep.      glipiZIDE SR (GLUCOTROL XL) 2.5 mg CR tablet Take 2.5 mg by mouth daily. aspirin delayed-release 81 mg tablet Take 81 mg by mouth daily. levothyroxine (SYNTHROID) 25 mcg tablet Take 50 mcg by mouth Daily (before breakfast). cyanocobalamin (VITAMIN B-12) 1,000 mcg tablet Take 2,500 mcg by mouth daily. docusate sodium (COLACE) 100 mg capsule Take 100 mg by mouth as needed for Constipation.          STOP taking these medications       amLODIPine (NORVASC) 5 mg tablet Comments:   Reason for Stopping:         losartan (COZAAR) 100 mg tablet Comments:   Reason for Stopping:               Procedures done this admission:  * No surgery found *    Consults this admission:  IP CONSULT TO ORTHOPEDIC SURGERY  IP CONSULT TO PHYSIATRIST(REHAB MEDICINE)    Echocardiogram/EKG results:  Results from Ancillary Procedure encounter on 06/21/21    ECHO ADULT COMPLETE    Interpretation Summary  · AV: Mild aortic valve leaflet calcification present without reduced excursion. Aortic valve mean gradient is 13 mmHg. Aortic valve area is 2.1 cm2. Mild aortic valve stenosis is present. · LV: Estimated LVEF is 55 - 60%. Normal cavity size and systolic function (ejection fraction normal). Septal asymmetric hypertrophy. Rest outflow gradient 114 with valsalva 171 Severe septal wall hypertrophy. Severe outflow tract obstruction. Wall motion: normal. Normal left ventricular strain. Moderate (grade 2) left ventricular diastolic dysfunction. · LA: Mildly dilated left atrium. · MV: Mild to moderate mitral valve regurgitation is present. Additional mitral valve findings: There is anterior leaflet systolic anterior motion (BEATRICE) noted. · TV: Mild tricuspid valve regurgitation is present. · HOCM       EKG Results     Procedure 720 Value Units Date/Time    EKG, 12 LEAD, INITIAL [[id number]]     Order Status: Completed           Diagnostic Imaging/Tests:   XR CHEST SNGL V    Result Date: 3/7/2022  EXAM: XR CHEST SNGL V HISTORY: sob. TECHNIQUE: Frontal chest. COMPARISON: 3/22/2021 FINDINGS: The cardiac silhouette, mediastinum, and pulmonary vasculature are within normal limits. There is no consolidation, pleural effusion, or pneumothorax. A fracture is seen in the proximal left humerus. No other significant osseous abnormalities are observed. No evidence of an acute intrathoracic process. XR SHOULDER LT AP/LAT MIN 2 V    Result Date: 3/6/2022  EXAM: 3 views of the left shoulder and 2 views of the left humerus. INDICATION: Fall Thursday with shoulder pain. COMPARISON: None.  FINDINGS: Acute comminuted fracture transversely oriented of the surgical neck of the humerus with extension into the base of the greater tuberosity and with medial displacement of approximately 7 mm. Acromioclavicular joint is preserved. Humeral head remains in articulation with the glenoid. Left lung appears clear. 1. Acute comminuted fracture of the surgical neck of the humerus with medial displacement and with fracture lines extending into the base of the greater tuberosity. XR HUMERUS LT    Result Date: 3/6/2022  EXAM: 3 views of the left shoulder and 2 views of the left humerus. INDICATION: Fall Thursday with shoulder pain. COMPARISON: None. FINDINGS: Acute comminuted fracture transversely oriented of the surgical neck of the humerus with extension into the base of the greater tuberosity and with medial displacement of approximately 7 mm. Acromioclavicular joint is preserved. Humeral head remains in articulation with the glenoid. Left lung appears clear. 1. Acute comminuted fracture of the surgical neck of the humerus with medial displacement and with fracture lines extending into the base of the greater tuberosity. CT HEAD WO CONT    Result Date: 3/6/2022  NONCONTRAST HEAD CT CLINICAL HISTORY:  Near syncopal episode after a fall on Thursday. TECHNIQUE:  Axial images were obtained with spiral technique. Radiation dose reduction was achieved using one or all of the following techniques: automated exposure control, weight-based dosing, iterative reconstruction. COMPARISON:  None. REPORT:   Standard noncontrast head CT demonstrates no definite intracranial mass effect, hemorrhage, or evidence of acute geographic infarction. The ventricles are normal in size and configuration, accounting for the patient's age. Orbits  and paranasal sinuses are clear where imaged. Bone windows demonstrate no definite fracture or destruction.      NO ACUTE INTRACRANIAL ABNORMALITY OR CALVARIAL FRACTURE IDENTIFIED AT NONCONTRAST CT.     CT SPINE CERV WO CONT    Result Date: 3/6/2022  EXAM: CT of the cervical spine. INDICATION: Fall on Thursday, neck pain. COMPARISON: None. Multiple axial images were obtained through the cervical spine without intravenous contrast. Coronal and sagittal reformatted images were also reviewed. Radiation dose reduction techniques were used for this study: All CT scans performed at this facility use one or all of the following: Automated exposure control, adjustment of the mA and/or kVp according to patient's size, iterative reconstruction. FINDINGS: Advanced diffuse osteopenia. Moderate atlantodental interval degenerative change. Moderate multilevel facet arthropathy. No significant spondylolisthesis. Vertebral body heights appear preserved. No evidence of acute cervical spine fracture. No prevertebral soft tissue swelling. Moderate degenerative disc changes C5-C6 through C7-T1 otherwise mild multilevel degenerative disc changes of the cervical spine. 1. No evidence of acute cervical spine fracture. All Micro Results     Procedure Component Value Units Date/Time    COVID-19 RAPID TEST [[id number]] Collected: 03/14/22 0917    Order Status: Completed Specimen: Nasopharyngeal Updated: 03/14/22 1021     Specimen source NASAL        Comment: RAPID ONLY        COVID-19 rapid test Not detected        Comment:      The specimen is NEGATIVE for SARS-CoV-2, the novel coronavirus associated with COVID-19. A negative result does not rule out COVID-19. This test has been authorized by the FDA under an Emergency Use Authorization (EUA) for use by authorized laboratories.         Fact sheet for Healthcare Providers: ConventionUpdate.co.nz  Fact sheet for Patients: ConventionUpdate.co.nz       Methodology: Isothermal Nucleic Acid Amplification         CULTURE, URINE [[id number]] Collected: 03/13/22 9042    Order Status: Completed Specimen: Urine from Clean catch Updated: 03/14/22 0805     Special Requests: NO SPECIAL REQUESTS        Culture result: NO GROWTH AFTER SHORT PERIOD OF INCUBATION. FURTHER RESULTS TO FOLLOW AFTER OVERNIGHT INCUBATION.           CULTURE, BLOOD [[id number]] Collected: 03/07/22 0929    Order Status: Completed Specimen: Blood Updated: 03/12/22 0915     Special Requests: --        RIGHT  FOREARM       Culture result: NO GROWTH 5 DAYS       CULTURE, BLOOD [[id number]] Collected: 03/07/22 0919    Order Status: Completed Specimen: Blood Updated: 03/12/22 0915     Special Requests: --        RIGHT  HAND       Culture result: NO GROWTH 5 DAYS       CULTURE, URINE [[id number]]  (Abnormal)  (Susceptibility) Collected: 03/06/22 2235    Order Status: Completed Specimen: Urine from Clean catch Updated: 03/09/22 0822     Special Requests: NO SPECIAL REQUESTS        Culture result:       >100,000 COLONIES/mL ESCHERICHIA COLI                Labs: Results:       BMP, Mg, Phos Recent Labs     03/14/22 0624 03/13/22 0707 03/12/22 0717    140 140   K 3.2* 3.6 3.4*   * 113* 113*   CO2 21 20* 19*   AGAP 7 7 8   BUN 15 13 18   CREA 1.10* 1.10* 1.00   CA 8.2* 8.5 8.2*   GLU 99 103* 117*   MG 1.9  --   --       CBC Recent Labs     03/14/22 0624 03/13/22 0707 03/12/22 0717   WBC 12.3* 13.1* 14.1*   RBC 2.66* 2.73* 2.66*   HGB 7.6* 8.0* 7.8*   HCT 24.9* 26.0* 25.2*    325 312   GRANS 66 68 74   LYMPH 21 20 14   EOS 3 2 2   MONOS 9 9 8   BASOS 0 0 0   IG 1 1 1   ANEU 8.2 8.9* 10.4*   ABL 2.6 2.6 2.0   TAMARA 0.3 0.3 0.3   ABM 1.1 1.1 1.1   ABB 0.0 0.0 0.0   AIG 0.1 0.2 0.2      LFT Recent Labs     03/14/22 0624   ALT 18      TP 5.1*   ALB 1.6*   GLOB 3.5   AGRAT 0.5*      Cardiac Testing No results found for: BNPP, BNP, CPK, RCK1, RCK2, RCK3, RCK4, CKMB, CKNDX, CKND1, TROPT, TROIQ   Coagulation Tests Lab Results   Component Value Date/Time    Prothrombin time 13.7 03/06/2022 09:45 PM    INR 1.0 03/06/2022 09:45 PM      A1c No results found for: HBA1C, [id number]   Lipid Panel No results found for: CHOL, CHOLPOCT, CHOLX, CHLST, CHOLV, 827596, HDL, HDLP, LDL, LDLC, DLDLP, 929897, VLDLC, VLDL, TGLX, TRIGL, TRIGP, TGLPOCT, CHHD, CHHDX   Thyroid Panel Lab Results   Component Value Date/Time    TSH 3.970 (H) 03/22/2021 04:12 PM        Most Recent UA Lab Results   Component Value Date/Time    Color YELLOW 03/13/2022 11:52 PM    Appearance CLEAR 03/13/2022 11:52 PM    pH (UA) 5.5 03/13/2022 11:52 PM    Protein Negative 03/13/2022 11:52 PM    Glucose Negative 03/13/2022 11:52 PM    Ketone Negative 03/13/2022 11:52 PM    Bilirubin Negative 03/13/2022 11:52 PM    Blood SMALL (A) 03/13/2022 11:52 PM    Urobilinogen 0.2 03/13/2022 11:52 PM    Nitrites Negative 03/13/2022 11:52 PM    Leukocyte Esterase MODERATE (A) 03/13/2022 11:52 PM    WBC 5-10 03/13/2022 11:52 PM    RBC 0-3 03/13/2022 11:52 PM    Epithelial cells 0-3 03/13/2022 11:52 PM    Bacteria TRACE 03/13/2022 11:52 PM    Other observations RESULTS VERIFIED MANUALLY 03/13/2022 11:52 PM          All Labs from Last 24 Hrs:  Recent Results (from the past 24 hour(s))   GLUCOSE, POC    Collection Time: 03/13/22  4:59 PM   Result Value Ref Range    Glucose (POC) 104 (H) 65 - 100 mg/dL    Performed by Lorenzo    GLUCOSE, POC    Collection Time: 03/13/22  9:05 PM   Result Value Ref Range    Glucose (POC) 170 (H) 65 - 100 mg/dL    Performed by Rito    URINALYSIS W/ RFLX MICROSCOPIC    Collection Time: 03/13/22 11:52 PM   Result Value Ref Range    Color YELLOW      Appearance CLEAR      Specific gravity 1.004 1.001 - 1.023      pH (UA) 5.5 5.0 - 9.0      Protein Negative NEG mg/dL    Glucose Negative mg/dL    Ketone Negative NEG mg/dL    Bilirubin Negative NEG      Blood SMALL (A) NEG      Urobilinogen 0.2 0.2 - 1.0 EU/dL    Nitrites Negative NEG      Leukocyte Esterase MODERATE (A) NEG      WBC 5-10 0 /hpf    RBC 0-3 0 /hpf    Epithelial cells 0-3 0 /hpf    Bacteria TRACE 0 /hpf    Other observations RESULTS VERIFIED MANUALLY     CULTURE, URINE    Collection Time: 03/13/22 11:52 PM Specimen: Clean catch; Urine    URINE   Result Value Ref Range    Special Requests: NO SPECIAL REQUESTS      Culture result:        NO GROWTH AFTER SHORT PERIOD OF INCUBATION. FURTHER RESULTS TO FOLLOW AFTER OVERNIGHT INCUBATION. CBC WITH AUTOMATED DIFF    Collection Time: 03/14/22  6:24 AM   Result Value Ref Range    WBC 12.3 (H) 4.3 - 11.1 K/uL    RBC 2.66 (L) 4.05 - 5.2 M/uL    HGB 7.6 (L) 11.7 - 15.4 g/dL    HCT 24.9 (L) 35.8 - 46.3 %    MCV 93.6 79.6 - 97.8 FL    MCH 28.6 26.1 - 32.9 PG    MCHC 30.5 (L) 31.4 - 35.0 g/dL    RDW 14.5 11.9 - 14.6 %    PLATELET 959 610 - 652 K/uL    MPV 10.0 9.4 - 12.3 FL    ABSOLUTE NRBC 0.00 0.0 - 0.2 K/uL    DF AUTOMATED      NEUTROPHILS 66 43 - 78 %    LYMPHOCYTES 21 13 - 44 %    MONOCYTES 9 4.0 - 12.0 %    EOSINOPHILS 3 0.5 - 7.8 %    BASOPHILS 0 0.0 - 2.0 %    IMMATURE GRANULOCYTES 1 0.0 - 5.0 %    ABS. NEUTROPHILS 8.2 1.7 - 8.2 K/UL    ABS. LYMPHOCYTES 2.6 0.5 - 4.6 K/UL    ABS. MONOCYTES 1.1 0.1 - 1.3 K/UL    ABS. EOSINOPHILS 0.3 0.0 - 0.8 K/UL    ABS. BASOPHILS 0.0 0.0 - 0.2 K/UL    ABS. IMM.  GRANS. 0.1 0.0 - 0.5 K/UL   MAGNESIUM    Collection Time: 03/14/22  6:24 AM   Result Value Ref Range    Magnesium 1.9 1.8 - 2.4 mg/dL   TRANSFERRIN SATURATION    Collection Time: 03/14/22  6:24 AM   Result Value Ref Range    Iron 15 (L) 35 - 150 ug/dL    TIBC 138 (L) 250 - 450 ug/dL    Transferrin Saturation 11 (L) >20 %   FERRITIN    Collection Time: 03/14/22  6:24 AM   Result Value Ref Range    Ferritin 447 (H) 8 - 493 NG/ML   METABOLIC PANEL, COMPREHENSIVE    Collection Time: 03/14/22  6:24 AM   Result Value Ref Range    Sodium 142 136 - 145 mmol/L    Potassium 3.2 (L) 3.5 - 5.1 mmol/L    Chloride 114 (H) 98 - 107 mmol/L    CO2 21 21 - 32 mmol/L    Anion gap 7 7 - 16 mmol/L    Glucose 99 65 - 100 mg/dL    BUN 15 8 - 23 MG/DL    Creatinine 1.10 (H) 0.6 - 1.0 MG/DL    GFR est AA >60 >60 ml/min/1.73m2    GFR est non-AA 50 (L) >60 ml/min/1.73m2    Calcium 8.2 (L) 8.3 - 10.4 MG/DL Bilirubin, total 0.3 0.2 - 1.1 MG/DL    ALT (SGPT) 18 12 - 65 U/L    AST (SGOT) 30 15 - 37 U/L    Alk.  phosphatase 108 50 - 136 U/L    Protein, total 5.1 (L) 6.3 - 8.2 g/dL    Albumin 1.6 (L) 3.2 - 4.6 g/dL    Globulin 3.5 2.3 - 3.5 g/dL    A-G Ratio 0.5 (L) 1.2 - 3.5     GLUCOSE, POC    Collection Time: 03/14/22  7:20 AM   Result Value Ref Range    Glucose (POC) 110 (H) 65 - 100 mg/dL    Performed by Aaron Galaviz    COVID-19 RAPID TEST    Collection Time: 03/14/22  9:17 AM   Result Value Ref Range    Specimen source NASAL      COVID-19 rapid test Not detected NOTD     GLUCOSE, POC    Collection Time: 03/14/22 11:58 AM   Result Value Ref Range    Glucose (POC) 142 (H) 65 - 100 mg/dL    Performed by Aaron Galaviz        Current Med List in Hospital:   Current Facility-Administered Medications   Medication Dose Route Frequency    bisacodyL (DULCOLAX) suppository 10 mg  10 mg Rectal DAILY    nystatin (MYCOSTATIN) 100,000 unit/gram powder   Topical BID    zinc oxide-cod liver oil (DESITIN) 40 % paste   Topical PRN    [Held by provider] enoxaparin (LOVENOX) injection 40 mg  40 mg SubCUTAneous DAILY    sodium chloride (NS) flush 5-40 mL  5-40 mL IntraVENous Q8H    sodium chloride (NS) flush 5-40 mL  5-40 mL IntraVENous PRN    acetaminophen (TYLENOL) tablet 650 mg  650 mg Oral Q6H PRN    Or    acetaminophen (TYLENOL) suppository 650 mg  650 mg Rectal Q6H PRN    polyethylene glycol (MIRALAX) packet 17 g  17 g Oral DAILY PRN    ondansetron (ZOFRAN ODT) tablet 4 mg  4 mg Oral Q8H PRN    Or    ondansetron (ZOFRAN) injection 4 mg  4 mg IntraVENous Q6H PRN    insulin lispro (HUMALOG) injection   SubCUTAneous AC&HS    [Held by provider] amLODIPine (NORVASC) tablet 5 mg  5 mg Oral DAILY    aspirin delayed-release tablet 81 mg  81 mg Oral DAILY    cetirizine (ZYRTEC) tablet 10 mg  10 mg Oral DAILY PRN    dorzolamide-timoloL (COSOPT) 22.3-6.8 mg/mL ophthalmic solution 1 Drop  1 Drop Both Eyes BID    [Held by provider] furosemide (LASIX) tablet 20 mg  20 mg Oral DAILY    latanoprost (XALATAN) 0.005 % ophthalmic solution 1 Drop  1 Drop Both Eyes QHS    levothyroxine (SYNTHROID) tablet 50 mcg  50 mcg Oral ACB    [Held by provider] losartan (COZAAR) tablet 100 mg  100 mg Oral DAILY    [Held by provider] metoprolol succinate (TOPROL-XL) XL tablet 100 mg  100 mg Oral DAILY    pantoprazole (PROTONIX) tablet 40 mg  40 mg Oral ACB    pregabalin (LYRICA) capsule 50 mg  50 mg Oral BID    rosuvastatin (CRESTOR) tablet 20 mg  20 mg Oral QHS    HYDROcodone-acetaminophen (NORCO) 7.5-325 mg per tablet 1 Tablet  1 Tablet Oral Q6H PRN       Allergies   Allergen Reactions    Amoxicillin-Pot Clavulanate Unknown (comments)     rash    Penicillins Hives    Sulfamethoxazole-Trimethoprim Rash     Immunization History   Administered Date(s) Administered    TB Skin Test (PPD) Intradermal 03/07/2022       Recent Vital Data:  Patient Vitals for the past 24 hrs:   Temp Pulse Resp BP SpO2   03/14/22 1158 98.5 °F (36.9 °C) 72 19 (!) 151/68 98 %   03/14/22 0709 98.7 °F (37.1 °C) 79 17 125/68 96 %   03/13/22 2315 97.7 °F (36.5 °C) 80 19 (!) 116/59 96 %   03/13/22 1915 98 °F (36.7 °C) 70 19 100/61 96 %   03/13/22 1551 98.4 °F (36.9 °C) 78 19 (!) 154/60 99 %     Oxygen Therapy  O2 Sat (%): 98 % (03/14/22 1158)  Pulse via Oximetry: 67 beats per minute (03/10/22 0348)  O2 Device: None (Room air) (03/14/22 0114)    Estimated body mass index is 37.18 kg/m² as calculated from the following:    Height as of this encounter: 5' 1.5\" (1.562 m). Weight as of this encounter: 90.7 kg (200 lb).     Intake/Output Summary (Last 24 hours) at 3/14/2022 1439  Last data filed at 3/14/2022 1188  Gross per 24 hour   Intake 9016.67 ml   Output 1385 ml   Net 7631.67 ml         Physical Exam:  General:          Frail, alert, conversational, in no acute distress, sitting upright in chair  Head:               Normocephalic, atraumatic  Eyes:               Sclerae appear normal.  Pupils equally round. ENT:                Nares appear normal, no drainage. Moist oral mucosa  Neck:               No restricted ROM. Trachea midline   CV:                  RRR.  + Murmur. No jugular venous distension. Lungs:             CTAB, diminished bilateral bases. No wheezing, rhonchi, or rales. Respirations even, unlabored on RA  Abdomen: Bowel sounds present. Soft, nontender, nondistended. Extremities:     No cyanosis or clubbing. No edema. LUE with sling intact/sensation and  strength intact  Skin:                No rashes. Warm and dry. Pale coloration but lips/oral mucosa pink. Neuro:             CN II-XII grossly intact. Sensation intact. A&Ox3. Moves all 4 extremities. Follows commands. No focal deficits. Psych:             Normal mood and affect. Signed:  Tracey Ferguson NP    Part of this note may have been written by using a voice dictation software. The note has been proof read but may still contain some grammatical/other typographical errors.

## 2022-03-14 NOTE — PROGRESS NOTES
ACUTE OT GOALS:  (Developed with and agreed upon by patient and/or caregiver.)  1. Patient will complete lower body bathing and dressing with MIN A and adaptive equipment as needed. 2.Patient will complete upper body bathing and dressing with MIN A and adaptive equipment as needed. 3. Patient will complete toileting with MIN A. GOAL MET 3/9/22  4. Patient will tolerate 25 minutes of OT treatment with 1-2 rest breaks to increase activity tolerance for ADLs. GOAL MET 3/9/22  5. Patient will complete functional transfers with SBA and adaptive equipment as needed. 6. Patient will complete functional activity with SUPERVISION and adaptive equipment as needed.     Timeframe: 7 visits     OCCUPATIONAL THERAPY: Daily Note OT Treatment Day # 4    Lizeth Soto is a 78 y.o. female   PRIMARY DIAGNOSIS: Humerus fracture  Fall [W19. XXXA]  Humerus fracture [S42.309A]  UTI (urinary tract infection) [N39.0]       [place] / [place] / Product Type: Medicare /   ASSESSMENT:     REHAB RECOMMENDATIONS: CURRENT LEVEL OF FUNCTION:  (Most Recently Demonstrated)   Recommendation to date pending progress:  Setting:   Short-term Rehab  Equipment:    To Be Determined Bathing:   Not tested  Dressing:   Not tested  Feeding/Grooming:   Minimal Assistance  Toileting:   Total Assistance for bowel hygiene  Functional Mobility:   Minimal Assistance x 2     ASSESSMENT:  Ms. Roman Soto is doing fair today. Pt presents supine upon arrival. Pt was able to perform bed mobility with min/mod A x2. Pt demonstrates fair sitting balance at EOB. Pt was able to stand with min A x2 and transfer onto [facility]. Pt dependent for bowel hygiene at this time. Pt was able to perform grooming at sink (brushing teeth/washing face). Pt fatigued fast this session. Minimal progress made today. Left in chair with all needs in reach. Will continue to benefit from skilled OT during stay.      SUBJECTIVE:   Ms. Roman Soto states, \"I think I am finished\"    SOCIAL HISTORY/LIVING ENVIRONMENT: lives alone in a one level home, 2 steps to enter, uses RW in home and [facility] outside home, tub shower with transfer bench, independent for ADLs and household IADLs, assist from community-level IADLs  Home Environment: Private residence  One/Two Story Residence: One story  Living Alone: Yes  Support Systems: Child(johanny)    OBJECTIVE:     PAIN: VITAL SIGNS: LINES/DRAINS:   Pre Treatment: Pain Screen  Pain Scale 1: Numeric (0 - 10)  Pain Intensity 1: 0  Post Treatment: 0   IV and Purewick  O2 Device: None (Room air)     ACTIVITIES OF DAILY LIVING: I Mod I S SBA CGA Min Mod Max Total NT Comments   BASIC ADLs:              Bathing/ Showering [] [] [] [] [] [] [] [] [] [x]    Toileting [] [] [] [] [] [] [] [] [x] [] Bowel hygiene   Dressing [] [] [] [] [] [] [] [] [] []    Feeding [] [] [] [] [] [] [] [] [] [x]    Grooming [] [] [] [] [] [] [] [] [] [] Brushed teeth/Washed face   Personal Device Care [] [] [] [] [] [] [] [] [] [x]    Functional Mobility [] [] [] [] [] [x] [x] [] [] [] x2   I=Independent, Mod I=Modified Independent, S=Supervision, SBA=Standby Assistance, CGA=Contact Guard Assistance,   Min=Minimal Assistance, Mod=Moderate Assistance, Max=Maximal Assistance, Total=Total Assistance, NT=Not Tested    MOBILITY: I Mod I S SBA CGA Min Mod Max Total  NT x2 Comments:   Supine to sit [] [] [] [] [] [] [x] [] [] [] [x]    Sit to supine [] [] [] [] [] [] [] [] [] [x] []    Sit to stand [] [] [] [] [] [x] [] [] [] [] [x] HHA   Bed to chair [] [] [] [] [] [x] [] [] [] [] [x] HHA   I=Independent, Mod I=Modified Independent, S=Supervision, SBA=Standby Assistance, CGA=Contact Guard Assistance,   Min=Minimal Assistance, Mod=Moderate Assistance, Max=Maximal Assistance, Total=Total Assistance, NT=Not Tested    BALANCE: Good Fair+ Fair Fair- Poor NT Comments   Sitting Static [] [x] [] [] [] [] On toilet   Sitting Dynamic [] [x] [] [] [] [] Molly care sitting on commode Standing Static [] [] [x] [] [] [] Standing EOB with HHA   Standing Dynamic [] [] [x] [] [] [] Ambulating min A x2 HHA     PLAN:   FREQUENCY/DURATION: OT Plan of Care: 3 times/week for duration of hospital stay or until stated goals are met, whichever comes first.    TREATMENT:   TREATMENT:   ($$ Self Care/Home Management: 23-37 mins$$ Neuromuscular Re-Education: 8-22 mins   )  Co-Treatment PT/OT necessary due to patient's decreased overall endurance/tolerance levels, as well as need for high level skilled assistance to complete functional transfers/mobility and functional tasks  Self Care (25 Minutes): Self care including Toileting and Grooming to increase independence and decrease level of assistance required. Neuromuscular Re-education (20 Minutes): Neuromuscular Re-education included Balance Training, Coordination training, Postural training, Sitting balance training and Standing balance training to improve Balance, Coordination, Functional Mobility and Postural Control.     TREATMENT GRID:  N/A    AFTER TREATMENT POSITION/PRECAUTIONS:  Chair, Needs within reach and RN notified    INTERDISCIPLINARY COLLABORATION:  RN/PCT, PT/PTA and OT/ZAIDI    TOTAL TREATMENT DURATION:  OT Patient Time In/Time Out  Time In: 1896  Time Out: 700 JC Hu

## 2022-03-14 NOTE — PROGRESS NOTES
ACUTE PHYSICAL THERAPY GOALS:  (Developed with and agreed upon by patient and/or caregiver. )  LTG:  (1.)Ms. Neftali Roy will move from supine to sit and sit to supine , scoot up and down and roll side to side in bed with STAND BY ASSIST within 7 treatment day(s). (2.)Ms. Neftali Roy will transfer from bed to chair and chair to bed with CONTACT GUARD ASSIST using the least restrictive device within 7 treatment day(s). (3.)Ms. Neftali Roy will ambulate with CONTACT GUARD ASSIST for 150 feet with the least restrictive device within 7 treatment day(s). (4.)Ms. Neftali Roy will perform exercises per HEP for 15+ minutes to improve strength and mobility within 7 days. PHYSICAL THERAPY: Daily Note and AM Treatment Day # 6    Shawn Roy is a 78 y.o. female   PRIMARY DIAGNOSIS: Humerus fracture  Fall [W19. XXXA]  Humerus fracture [S42.309A]  UTI (urinary tract infection) [N39.0]         ASSESSMENT:     REHAB RECOMMENDATIONS: CURRENT LEVEL OF FUNCTION:  (Most Recently Demonstrated)   Recommendation to date pending progress:  Setting:   Short-term Rehab  Equipment:    To Be Determined Bed Mobility:   Minimal Assistance x 2  Sit to Stand:   Moderate Assistance x 2    Transfers:   Minimal Assistance x 2  Gait/Mobility:   x 2 min-mod      ASSESSMENT:  Ms. Neftali Roy was supine in bed on arrival. She is agreeable to PT and would like to get to [facility] to have bowel movement. She required Delia of 2 to sit at EOB. STS with modA of 2. She was able to ambulate 2 ft and transfer to [facility] with min-mod HHA of 2. She then ambulated 5 ft to  transfer to chair- decreased gait speed, short shuffled steps, and unsteadiness noted. Attempted another STS but pt too fatigued to participate in any further mobility. Some progress, will continue to follow. SUBJECTIVE:   Ms. Neftali Roy states, \"thank ya'll so much\"    SOCIAL HISTORY/ LIVING ENVIRONMENT: Lives alone. Mod I with rolling walker at baseline.  Niece/other family members come to assist frequently.    Home Environment: Private residence  One/Two Story Residence: One story  Living Alone: Yes  Support Systems: Child(johanny)  OBJECTIVE:     PAIN: VITAL SIGNS: LINES/DRAINS:   Pre Treatment: Pain Screen  Pain Scale 1: Numeric (0 - 10)  Pain Intensity 1: 5  Pain Location 1: Arm  Pain Orientation 1: Left  Pain Intervention(s) 1: Ambulation/Increased Activity   Post Treatment: 5   Purewick  O2 Device: None (Room air)     MOBILITY: I Mod I S SBA CGA Min Mod Max Total  NT x2 Comments:   Bed Mobility    Rolling [] [] [] [] [] [x] [] [] [] [] [x]    Supine to Sit [] [] [] [] [] [x] [] [] [] [] [x]    Scooting [] [] [] [] [] [x] [] [] [] [] [x]    Sit to Supine [] [] [] [] [] [] [] [] [] [x] []    Transfers    Sit to Stand [] [] [] [] [] [] [x] [] [] [] [x]    Bed to Chair [] [] [] [] [] [x] [x] [] [] [] [x]    Stand to Sit [] [] [] [] [] [] [x] [] [] [] [x]    I=Independent, Mod I=Modified Independent, S=Supervision, SBA=Standby Assistance, CGA=Contact Guard Assistance,   Min=Minimal Assistance, Mod=Moderate Assistance, Max=Maximal Assistance, Total=Total Assistance, NT=Not Tested    BALANCE: Good Fair+ Fair Fair- Poor NT Comments   Sitting Static [] [x] [] [] [] []    Sitting Dynamic [] [x] [] [] [] []              Standing Static [] [] [] [x] [] []    Standing Dynamic [] [] [] [x] [] []      GAIT: I Mod I S SBA CGA Min Mod Max Total  NT x2 Comments:   Level of Assistance [] [] [] [] [] [x] [x] [] [] [] [x]    Distance 2 ft, 5 ft    DME handheld assist    Gait Quality Unsteady, weak, fatigues quickly, short, shuffled steps    Weightbearing  Status NWB L UE in sling    I=Independent, Mod I=Modified Independent, S=Supervision, SBA=Standby Assistance, CGA=Contact Guard Assistance,   Min=Minimal Assistance, Mod=Moderate Assistance, Max=Maximal Assistance, Total=Total Assistance, NT=Not Tested    PLAN:   FREQUENCY/DURATION: PT Plan of Care: 3 times/week for duration of hospital stay or until stated goals are met, whichever comes first.  TREATMENT:     TREATMENT:   ($$ Therapeutic Activity: 38-52 mins    )  Co-Treatment PT/OT necessary due to patient's decreased overall endurance/tolerance levels, as well as need for high level skilled assistance to complete functional transfers/mobility and functional tasks  Therapeutic Activity (47 Minutes): Therapeutic activity included Rolling, Supine to Sit, Scooting, Transfer Training, Ambulation on level ground, Sitting balance  and Standing balance to improve functional Mobility, Strength and Activity tolerance.     TREATMENT GRID:   Date:  3/8/22 Date:   Date:     Activity/Exercise Parameters Parameters Parameters   LAQ 10x     Seated marching 10x     Seated hip abduction 10x     Ankle pumps 10x                         AFTER TREATMENT POSITION/PRECAUTIONS:  Chair, Needs within reach and RN notified    INTERDISCIPLINARY COLLABORATION:  RN/PCT and OT/ZAIDI    TOTAL TREATMENT DURATION:  PT Patient Time In/Time Out  Time In: 0923  Time Out: 5556 DANI Frazier

## 2022-03-14 NOTE — PROGRESS NOTES
Bedside and Verbal shift change report given to Royce Wiggins RN (oncoming nurse) by KIKE Gallardo (offgoing nurse). Report included the following information SBAR, Kardex, Intake/Output, MAR, Recent Results, Alarm Parameters  and Quality Measures. Pt resting in bed at this time. Respirations are even and unlabored on RA. No acute distress noted. Bed lowered and locked. Call light within reach.

## 2022-03-14 NOTE — PROGRESS NOTES
Problem: Pressure Injury - Risk of  Goal: *Prevention of pressure injury  Description: Document Dustin Scale and appropriate interventions in the flowsheet. Outcome: Resolved/Met  Note: Pressure Injury Interventions:  Sensory Interventions: Assess changes in LOC,Assess need for specialty bed,Avoid rigorous massage over bony prominences,Check visual cues for pain,Discuss PT/OT consult with provider,Float heels,Keep linens dry and wrinkle-free,Maintain/enhance activity level,Minimize linen layers,Monitor skin under medical devices,Pad between skin to skin,Pressure redistribution bed/mattress (bed type),Turn and reposition approx. every two hours (pillows and wedges if needed)    Moisture Interventions: Apply protective barrier, creams and emollients,Absorbent underpads,Check for incontinence Q2 hours and as needed,Internal/External urinary devices,Limit adult briefs    Activity Interventions: Increase time out of bed,Pressure redistribution bed/mattress(bed type),PT/OT evaluation    Mobility Interventions: Chair cushion,PT/OT evaluation,Pressure redistribution bed/mattress (bed type)    Nutrition Interventions: Document food/fluid/supplement intake    Friction and Shear Interventions: Apply protective barrier, creams and emollients,Foam dressings/transparent film/skin sealants                Problem: Falls - Risk of  Goal: *Absence of Falls  Description: Document Dominik Fall Risk and appropriate interventions in the flowsheet.   Outcome: Resolved/Met  Note: Fall Risk Interventions:  Mobility Interventions: Bed/chair exit alarm,Communicate number of staff needed for ambulation/transfer,Patient to call before getting OOB         Medication Interventions: Evaluate medications/consider consulting pharmacy,Patient to call before getting OOB,Teach patient to arise slowly    Elimination Interventions: Bed/chair exit alarm,Patient to call for help with toileting needs    History of Falls Interventions: Bed/chair exit alarm,Door open when patient unattended,Investigate reason for fall,Room close to nurse's station         Problem: General Medical Care Plan  Goal: *Vital signs within specified parameters  Outcome: Resolved/Met  Goal: *Labs within defined limits  Outcome: Resolved/Met  Goal: *Absence of infection signs and symptoms  Outcome: Resolved/Met  Goal: *Optimal pain control at patient's stated goal  Outcome: Resolved/Met  Goal: *Skin integrity maintained  Outcome: Resolved/Met  Goal: *Fluid volume balance  Outcome: Resolved/Met  Goal: *Optimize nutritional status  Outcome: Resolved/Met  Goal: *Anxiety reduced or absent  Outcome: Resolved/Met  Goal: *Progressive mobility and function (eg: ADL's)  Outcome: Resolved/Met

## 2022-03-16 LAB
BACTERIA SPEC CULT: NORMAL
SERVICE CMNT-IMP: NORMAL

## 2022-03-18 PROBLEM — E83.42 HYPOMAGNESEMIA: Status: ACTIVE | Noted: 2017-02-01

## 2022-03-18 PROBLEM — D64.9 ACUTE ON CHRONIC ANEMIA: Status: ACTIVE | Noted: 2022-03-12

## 2022-03-18 PROBLEM — R29.898 WEAKNESS OF BOTH LEGS: Status: ACTIVE | Noted: 2021-03-08

## 2022-03-18 PROBLEM — E87.29 HYPERCHLOREMIC METABOLIC ACIDOSIS: Status: ACTIVE | Noted: 2022-03-13

## 2022-03-18 PROBLEM — M15.0 PRIMARY GENERALIZED HYPERTROPHIC OSTEOARTHROSIS: Status: ACTIVE | Noted: 2017-01-23

## 2022-03-18 PROBLEM — E66.01 CLASS 2 SEVERE OBESITY DUE TO EXCESS CALORIES WITH SERIOUS COMORBIDITY IN ADULT (HCC): Status: ACTIVE | Noted: 2017-04-05

## 2022-03-18 PROBLEM — E03.9 HYPOACTIVE THYROID: Status: ACTIVE | Noted: 2017-04-05

## 2022-03-18 PROBLEM — K21.9 GASTROESOPHAGEAL REFLUX DISEASE: Status: ACTIVE | Noted: 2017-04-05

## 2022-03-18 PROBLEM — E66.812 CLASS 2 SEVERE OBESITY DUE TO EXCESS CALORIES WITH SERIOUS COMORBIDITY IN ADULT: Status: ACTIVE | Noted: 2017-04-05

## 2022-03-18 PROBLEM — E11.9 TYPE 2 DIABETES MELLITUS (HCC): Status: ACTIVE | Noted: 2017-04-05

## 2022-03-18 PROBLEM — I51.7 LVH (LEFT VENTRICULAR HYPERTROPHY): Status: ACTIVE | Noted: 2021-07-07

## 2022-03-19 PROBLEM — E61.1 IRON DEFICIENCY: Status: ACTIVE | Noted: 2022-03-14

## 2022-03-19 PROBLEM — N18.9 ACUTE KIDNEY INJURY SUPERIMPOSED ON CHRONIC KIDNEY DISEASE (HCC): Status: ACTIVE | Noted: 2022-03-07
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
## 2022-03-19 PROBLEM — E78.2 HYPERLIPEMIA, MIXED: Status: ACTIVE | Noted: 2017-10-02

## 2022-03-19 PROBLEM — J40 BRONCHITIS: Status: ACTIVE | Noted: 2018-07-26

## 2022-03-19 PROBLEM — R53.81 DEBILITY: Status: ACTIVE | Noted: 2021-03-08

## 2022-03-19 PROBLEM — I11.9 LVH (LEFT VENTRICULAR HYPERTROPHY) DUE TO HYPERTENSIVE DISEASE: Status: ACTIVE | Noted: 2017-05-31

## 2022-03-19 PROBLEM — E87.6 HYPOKALEMIA: Status: ACTIVE | Noted: 2017-02-01

## 2022-03-19 PROBLEM — N39.0 ESCHERICHIA COLI URINARY TRACT INFECTION: Status: ACTIVE | Noted: 2017-02-01

## 2022-03-19 PROBLEM — I35.8 AORTIC VALVE SCLEROSIS: Status: ACTIVE | Noted: 2021-03-08

## 2022-03-19 PROBLEM — G89.4 CHRONIC PAIN ASSOCIATED WITH SIGNIFICANT PSYCHOSOCIAL DYSFUNCTION: Status: ACTIVE | Noted: 2017-01-23

## 2022-03-19 PROBLEM — L89.92 PRESSURE ULCER, STAGE 2 (HCC): Status: ACTIVE | Noted: 2017-02-03

## 2022-03-19 PROBLEM — D64.9 CHRONIC ANEMIA: Status: ACTIVE | Noted: 2017-04-05

## 2022-03-19 PROBLEM — N17.9 ACUTE KIDNEY INJURY SUPERIMPOSED ON CHRONIC KIDNEY DISEASE (HCC): Status: ACTIVE | Noted: 2022-03-07

## 2022-03-19 PROBLEM — A41.9 SEPSIS (HCC): Status: ACTIVE | Noted: 2022-03-10

## 2022-03-19 PROBLEM — B96.20 ESCHERICHIA COLI URINARY TRACT INFECTION: Status: ACTIVE | Noted: 2017-02-01

## 2022-03-19 PROBLEM — I25.10 CORONARY ARTERY DISEASE INVOLVING NATIVE CORONARY ARTERY OF NATIVE HEART WITHOUT ANGINA PECTORIS: Status: ACTIVE | Noted: 2017-05-31

## 2022-03-19 PROBLEM — S42.309A HUMERUS FRACTURE: Status: ACTIVE | Noted: 2022-03-06

## 2022-03-19 PROBLEM — E11.42 DIABETIC POLYNEUROPATHY (HCC): Status: ACTIVE | Noted: 2017-02-01

## 2022-03-19 PROBLEM — R79.89 ELEVATED BRAIN NATRIURETIC PEPTIDE (BNP) LEVEL: Status: ACTIVE | Noted: 2017-05-04

## 2022-03-20 PROBLEM — R62.7 ADULT FAILURE TO THRIVE: Status: ACTIVE | Noted: 2022-03-07

## 2022-03-20 PROBLEM — I44.7 LBBB (LEFT BUNDLE BRANCH BLOCK): Status: ACTIVE | Noted: 2017-05-04

## 2022-03-20 PROBLEM — D47.2 MGUS (MONOCLONAL GAMMOPATHY OF UNKNOWN SIGNIFICANCE): Status: ACTIVE | Noted: 2021-10-13

## 2022-03-20 PROBLEM — B96.20 E. COLI UTI (URINARY TRACT INFECTION): Status: ACTIVE | Noted: 2022-03-12

## 2022-03-20 PROBLEM — N39.0 E. COLI UTI (URINARY TRACT INFECTION): Status: ACTIVE | Noted: 2022-03-12

## 2022-03-20 PROBLEM — I10 ESSENTIAL HYPERTENSION: Status: ACTIVE | Noted: 2017-05-04
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
## 2022-05-25 ENCOUNTER — CARE COORDINATION (OUTPATIENT)
Dept: CARE COORDINATION | Facility: CLINIC | Age: 82
End: 2022-05-25

## 2022-05-25 NOTE — CARE COORDINATION
2022    Patient: Dany Orellana Patient : 1940   MRN: [id number]  Reason for Admission:   Discharge Date: 3/14/22 RARS: No data recorded               Future Appointments   Date Time Provider Liz Simpson   2022  9:45 AM Dorethea Apgar, MD [facility]       Jeny Hummel RN

## 2022-06-01 ENCOUNTER — CARE COORDINATION (OUTPATIENT)
Dept: CARE COORDINATION | Facility: CLINIC | Age: 82
End: 2022-06-01

## 2022-06-01 NOTE — CARE COORDINATION
2022    Patient: Dorlene Pallas   Patient : 1940   MRN: [id number]    Discharge Date: 3/14/22    Post Acute Facility Update    Care Transitions Post Acute Facility Update    Care Transitions Interventions  Post Acute Facility Update  Reported Nursing Issues: No nursing concerns noted. Ortho f/u 22. V/S stables   ADLs: Minimal Assistance   Bed Mobility: Contact Guard Assist - Hands on patient for balance   Transfer Assistance: Contact [address] on patient for balance   Ambulation Assistance: Stand by Assist - Presence and Cueing   How far (in feet) is the patient ambulating?: 250   Does patient use an assistive device?: Yes (Comment: rollator)   Assistive Devices: Rollator   Barriers to Discharge: Lives alone and still having difficulty with steps. Pt. Has 6 steps to enter the home. She needs to be independent and able to get in and out of her home. Declines assisted. Anticipated discharge services: [facility] services. Next IDT Planned Review: 22    Future Appointments   Date Time Provider Liz Simpson   2022  9:45 AM MD JOHNNIE Coates [facility]       SN Notes:   o Diet: - Reg - Mechanically ground meat    o Medications: Administered by SNF. No issues.   Post-acute CC Notes: F/U with Ortho 22    Kisha Zhou RN                            Care Transitions Post Acute Facility Transition          Care Transitions Interventions         Future Appointments   Date Time Provider Liz Simpson   2022  9:45 AM MD JOHNNIE Coates RN

## 2022-06-02 ENCOUNTER — OFFICE VISIT (OUTPATIENT)
Dept: ORTHOPEDIC SURGERY | Age: 82
End: 2022-06-02
Payer: MEDICARE

## 2022-06-02 DIAGNOSIS — S42.202D CLOSED FRACTURE OF PROXIMAL END OF LEFT HUMERUS WITH ROUTINE HEALING, UNSPECIFIED FRACTURE MORPHOLOGY, SUBSEQUENT ENCOUNTER: Primary | ICD-10-CM

## 2022-06-02 PROCEDURE — 4004F PT TOBACCO SCREEN RCVD TLK: CPT | Performed by: ORTHOPAEDIC SURGERY

## 2022-06-02 PROCEDURE — G8400 PT W/DXA NO RESULTS DOC: HCPCS | Performed by: ORTHOPAEDIC SURGERY

## 2022-06-02 PROCEDURE — G8427 DOCREV CUR MEDS BY ELIG CLIN: HCPCS | Performed by: ORTHOPAEDIC SURGERY

## 2022-06-02 PROCEDURE — G8417 CALC BMI ABV UP PARAM F/U: HCPCS | Performed by: ORTHOPAEDIC SURGERY

## 2022-06-02 PROCEDURE — 1123F ACP DISCUSS/DSCN MKR DOCD: CPT | Performed by: ORTHOPAEDIC SURGERY

## 2022-06-02 PROCEDURE — 1090F PRES/ABSN URINE INCON ASSESS: CPT | Performed by: ORTHOPAEDIC SURGERY

## 2022-06-02 PROCEDURE — 99212 OFFICE O/P EST SF 10 MIN: CPT | Performed by: ORTHOPAEDIC SURGERY

## 2022-06-02 NOTE — PROGRESS NOTES
Progress Note    Patient: Poonam Becerra MRN: [id number]  SSN: [id number]    YOB: 1940  Age: 78 y.o. Sex: female        6/2/2022      Subjective:     Patient is now about 3 months out from nonoperative treatment of left proximal humerus fracture. She says she definitely is getting a lot better and she is much better than she was when she saw me last.  She is a little bit frustrated just visits taken so much hard work to be able to move her arm and she still is not quite able to abduct her arm to 90 degrees. She is also asking about her rotator cuff she says one of the therapist has brought this up as perhaps the reason that she is not able to do more with her left shoulder    Objective: There were no vitals filed for this visit. Physical Exam:     Skin - no open wounds or pressure sores  Motor and sensory function intact in LEFT UPPER extremity  Pulses palpable in LEFT UPPER extremity     XRAY FINDINGS:  [id number] left proximal humerus fracture, findings-true AP and scapular Y views of the left shoulder shows the left proximal humerus fracture is reasonably well aligned with the AP and lateral projection. There does appear to be significant evidence of interval healing impression #reasonly well aligned healing left proximal humerus fracture    Assessment:     Healing left proximal humerus fracture    Plan:     I have spoke with the patient regarding the fact that first of all is very difficult on physical exam to separate out the normal testing things we do with rotator cuff versus just the fact that she has this proximal humerus fracture. She easily could have a rotator cuff injury however the question is what we would do differently. The only way to really know for sure would be to get an MRI and this would not really change what we did.   If she did have a rotator cuff tear on MRI then she would either just continue to do what she is doing or get a reverse shoulder arthroplasty so it really would not change what we were and what we are doing. Also it is very common to have these problems regaining motion after a proximal humerus fracture. Proximal humerus fracture is in general doing very poorly as far as regaining strength and range of motion so she is pretty typical as far as how she is doing this now. As long as she is able to use her arm reasonably well and have very little pain and I think she is actually doing pretty well. She can be full activity with no restriction she can be weightbearing as tolerated she can be full active and passive range of motion. If she were to get to the point where she had so much pain that she just could not tolerate it then that would be the time however we would consider perhaps a shoulder arthroplasty but as long as she is pretty happy with how she is doing well then really does not have very good options think it is worth getting the MRI or investigating this further. I try to explain this to her today.   I will see her back in about another 6 weeks just to make sure she is continuing to regain some more function she will be about 4 and half months out at that time    Signed By: Tootie Potts MD     June 2, 2022

## 2022-06-08 ENCOUNTER — CARE COORDINATION (OUTPATIENT)
Dept: CARE COORDINATION | Facility: CLINIC | Age: 82
End: 2022-06-08

## 2022-06-08 NOTE — CARE COORDINATION
Post-Acute Facility Update Call- Amberkatyt Zoe    2022    Patient: Ben Ceravntes   Patient : 1940   MRN: [id number]    Discharge Date: 3/14/22   Care Transitions Post Acute Facility Update    Care Transitions Interventions  Post Acute Facility Update  Reported Nursing Issues: BMP ordered . No new med orders at this time. Regular diet with mechanical ground meat. V/S stable. ADLs: Stand by Assist - Presence and Cueing   Bed Mobility: Stand by Assist - Presence and Cueing   Transfer Assistance: Stand by Assist - Presence and Cueing   Ambulation Assistance: Stand by Assist - Presence and Cueing, Minimal Assistance   How far (in feet) is the patient ambulating?: 200   Does patient use an assistive device?: Yes (Comment: Rollator)   Assistive Devices: Rollator   Barriers to Discharge: SUP BED MOBILITY AND AND ADLS - PT TO FOCUS ON [address]. Barrier to discharge is not able to get in and our of home at this time. Continue working on steps. Pt. will be home alone. PT/OT . Lives alone, no family involved so must be able to get in out of her home via steps. Refuses LANE. Anticipated date for discharge: 22    [facility] at discharge. Anticipated d/c - 22 pending step progression to enter and exit her home.

## 2022-06-15 ENCOUNTER — CARE COORDINATION (OUTPATIENT)
Dept: CARE COORDINATION | Facility: CLINIC | Age: 82
End: 2022-06-15

## 2022-06-15 NOTE — CARE COORDINATION
Post-Acute Facility Update Jean Claude Cotto    6/15/2022    Patient: Virgil Richter   Patient : 1940   MRN: [id number]    Reason for Admission: Proximal Humeral fx. S/p UTI, MARK. Class II Darron obesity. PMH hypertension, hypothyroidism, CAD, chronic LBBB, AV Sclerosis, DM II   Discharge Date: 3/14/22      Care Transitions Post Acute Facility Update    Care Transitions Interventions  Post Acute Facility Update  Reported Nursing Issues: Lasix was decreased to 20mg BMP on No new med orders at this time. Regular diet with mechanical ground meat. V/S stable. ADLs: Moderate Assistance   Bed Mobility: Contact Guard Assist - Hands on patient for balance   Transfer Assistance: Stand by Assist - Presence and Cueing   Ambulation Assistance: Stand by Assist - Presence and Cueing   How far (in feet) is the patient ambulating?: 300   Does patient use an assistive device?: Yes (Comment: Rollator)   Assistive Devices: Rollator   Anticipated date for discharge: 22    Anticipated discharge services: [address] [place] [address] services. Home alone. normal...

## 2022-06-22 ENCOUNTER — CARE COORDINATION (OUTPATIENT)
Dept: CARE COORDINATION | Facility: CLINIC | Age: 82
End: 2022-06-22

## 2022-06-22 NOTE — CARE COORDINATION
Post-Acute Facility Update Abraham Taylor    2022    Patient: Gadiel Mccauley   Patient : 1940   MRN: [id number]  Discharge Date: 3/14/22   Discharged from [facility] on 22 w/ [facility]. Care Transitions Post Acute Facility Update    Care Transitions Interventions  Post Acute Facility Update  Reported Nursing Issues: No change. Same diet. Regular diet with mechanical ground meat. DISCHARGED FROM PT/OT      ADLs: Stand by Assist - Presence and Cueing   Bed Mobility: Independent   Transfer Assistance: Independent   Ambulation Assistance: Independent   How far (in feet) is the patient ambulating?: 250   Does patient use an assistive device?: Yes (Comment: RW)   Assistive Devices: RW   Anticipated date for discharge: 22    Anticipated discharge services: [facility], RN, PT,OT, MOW, life alert system.

## 2022-06-23 ENCOUNTER — CARE COORDINATION (OUTPATIENT)
Dept: CARE COORDINATION | Facility: CLINIC | Age: 82
End: 2022-06-23

## 2022-06-23 NOTE — CARE COORDINATION
Initial [facility] outreach call. S/P discharge from [facility] STR. LVM, stating reason for my call and provided my contact information. Plan - Schedule 2nd outreach 6/24.

## 2022-06-24 ENCOUNTER — CARE COORDINATION (OUTPATIENT)
Dept: CARE COORDINATION | Facility: CLINIC | Age: 82
End: 2022-06-24

## 2022-06-24 NOTE — CARE COORDINATION
Estuardo Funk is a 78 y.o. female recently discharged  6/22/22 from [facility] with a reported diagnosis of sepsis,UTI,  humeral fx, s/p fall , failure to thriveThe patient was contacted post discharge and the answers were provided by Jeny Hummel RN. Patient/CG had  understanding of reason for admission. Medications were reviewed and the patient understood her daily medication regimen. Patient denied any question or concerns regarding her medications. RW used to ambulate in her home. She reports her Luisa Prashant Niece is staying with her at nights and other family/friends bring her food and help with transportation. She reports she will contact her PCP to schedule a f/u appointment once she speaks with her niece to learn her schedule. She confirms SOC with [facility]. RN services yesterday and PT to arrive today. Ms. Telma Florentino reports she is doing well and has great support. She denied any further needs or concerns. I provided her with my contact information and encouraged her to call with any questions or concerns. She verbalized understanding and was very appreciative of the [place] call today. No further needs identified today.   Jeny Hummel RN  6/24/2022

## 2022-07-14 ENCOUNTER — OFFICE VISIT (OUTPATIENT)
Dept: ORTHOPEDIC SURGERY | Age: 82
End: 2022-07-14

## 2022-07-14 DIAGNOSIS — S42.202D CLOSED FRACTURE OF PROXIMAL END OF LEFT HUMERUS WITH ROUTINE HEALING, UNSPECIFIED FRACTURE MORPHOLOGY, SUBSEQUENT ENCOUNTER: Primary | ICD-10-CM

## 2022-07-14 PROCEDURE — 99024 POSTOP FOLLOW-UP VISIT: CPT | Performed by: ORTHOPAEDIC SURGERY

## 2022-07-14 NOTE — PROGRESS NOTES
Progress Note    Patient: Yadi Campuzano MRN: [id number]  SSN: [id number]    YOB: 1940  Age: 78 y.o. Sex: female        7/14/2022      Subjective:     Patient is now about 4-1/2 months out from a left proximal humerus fracture. This was treated nonoperatively. She is doing just okay with her arm. She still says she has quite a bit of pain. She is getting some therapy and she thinks is gradually getting better and she thinks that therapy is helping. She is able to do most of her ADLs with her left arm but not really able to do much at all as far as forward flexion or abduction with her left shoulder    Objective: There were no vitals filed for this visit. Physical Exam:     Skin - no open wounds or pressure sores  Motor and sensory function intact in LEFT UPPER extremity  Pulses palpable in LEFT UPPER extremity   She can only abduct to about 30 degrees and forward flex about the same amount  XRAY FINDINGS:  [id number] left proximal humerus fracture, findings-true AP and scapular Y views of the left shoulder shows the left three-part surgical neck proximal humerus fracture shows significant evidence of healing and does appear to be healing in a reasonable position impression #healing left proximal humerus fracture    Assessment:     Healing left proximal humerus fracture, left shoulder contracture    Plan:     I think for her proximal humerus fracture she is doing pretty well as far as healing this and the overall radiographic appearance. Clinically however she is really still very limited with her range of motion. She also says she has a good bit of pain. I briefly broached the subject about any sort of reconstruction surgery and she says she definitely does not think she is interested in that at this time.   I think it would be reasonable just to see her back for 1 final visit in about 8 weeks with AP and scapular Y views left shoulder she will be about 6 and half months out at that time and just continue with her therapy for full active and aggressive passive range of motion of the left shoulder female and then.     Signed By: Abdoul Aaron MD     July 14, 2022

## 2022-08-31 ENCOUNTER — TELEPHONE (OUTPATIENT)
Dept: ORTHOPEDIC SURGERY | Age: 82
End: 2022-08-31

## 2022-09-02 ENCOUNTER — TELEPHONE (OUTPATIENT)
Dept: ORTHOPEDIC SURGERY | Age: 82
End: 2022-09-02

## 2022-09-02 NOTE — TELEPHONE ENCOUNTER
Emma Sidhu with [facility] [phone] wants to give current status to med asst.

## 2022-09-08 ENCOUNTER — OFFICE VISIT (OUTPATIENT)
Dept: ORTHOPEDIC SURGERY | Age: 82
End: 2022-09-08
Payer: MEDICARE

## 2022-09-08 DIAGNOSIS — S42.202D CLOSED FRACTURE OF PROXIMAL END OF LEFT HUMERUS WITH ROUTINE HEALING, UNSPECIFIED FRACTURE MORPHOLOGY, SUBSEQUENT ENCOUNTER: Primary | ICD-10-CM

## 2022-09-08 PROCEDURE — 99212 OFFICE O/P EST SF 10 MIN: CPT | Performed by: ORTHOPAEDIC SURGERY

## 2022-09-08 PROCEDURE — G8428 CUR MEDS NOT DOCUMENT: HCPCS | Performed by: ORTHOPAEDIC SURGERY

## 2022-09-08 PROCEDURE — G8417 CALC BMI ABV UP PARAM F/U: HCPCS | Performed by: ORTHOPAEDIC SURGERY

## 2022-09-08 PROCEDURE — G8400 PT W/DXA NO RESULTS DOC: HCPCS | Performed by: ORTHOPAEDIC SURGERY

## 2022-09-08 PROCEDURE — 4004F PT TOBACCO SCREEN RCVD TLK: CPT | Performed by: ORTHOPAEDIC SURGERY

## 2022-09-08 PROCEDURE — 1123F ACP DISCUSS/DSCN MKR DOCD: CPT | Performed by: ORTHOPAEDIC SURGERY

## 2022-09-08 PROCEDURE — 1090F PRES/ABSN URINE INCON ASSESS: CPT | Performed by: ORTHOPAEDIC SURGERY

## 2022-09-08 NOTE — PROGRESS NOTES
Progress Note    Patient: Fred Jones MRN: [id number]  SSN: [id number]    YOB: 1940  Age: 78 y.o. Sex: female        9/8/2022      Subjective:     Patient is now about 6-1/2 months out from a left proximal humerus fracture. This was mostly a 2 part fracture and it did heal and some malalignment. She says that she has some pain in her shoulder and for the most part she says it is manageable. She takes some Tylenol and over-the-counter things and uses some Biofreeze and she is able to get along pretty well but she still has very limited use of her shoulder and it does hurt she says more in her upper arm area than really the shoulder joint itself. Objective: There were no vitals filed for this visit. Physical Exam:     Skin - no open wounds or pressure sores  Motor and sensory function intact in LEFT UPPER extremity  Pulses palpable in LEFT UPPER extremity     XRAY FINDINGS:  [id number]-yc left proximal humerus fracture, findings-true AP and scapular Y views of the left shoulder shows the left proximal humerus fracture appears to have healed but has healed in a malunited position. It is reasonable in its position but definitely not ideal.  She does have some pretty significant degenerative changes in her shoulder impression-healed malaligned left proximal humerus fracture      Assessment:     Healed malaligned left proximal humerus fracture    Plan:     I have spoken with MsJose Luis Anastasia Jose at length today regarding treatment options. I definitely think that if she were willing to go through with it that she probably would be helped with a shoulder replacement this would likely involve a reverse shoulder arthroplasty. Either myself or probably someone like Dr. Adeline Givens would be good to perform this. She really does not think she is interested in having any further surgery.   I have just tried to encourage her that if it gets to the point where she just really has so much pain that she can even do sort of normal things and she is just miserable from it then that would be the next step and I think it be a very reasonable step but again I think she has to be at the point where she is willing to go ahead and proceed with that. She understands this she will consider these things and I think she can follow-up on an as-needed basis.   If she would like us to proceed with a shoulder replacement then I think I would either see her back myself or perhaps refer her to Dr. Natarajan Medicine By: Danii Lomas MD     September 8, 2022

## 2023-05-01 ENCOUNTER — OFFICE VISIT (OUTPATIENT)
Dept: CARDIOLOGY CLINIC | Age: 83
End: 2023-05-01
Payer: MEDICARE

## 2023-05-01 VITALS
HEIGHT: 62 IN | BODY MASS INDEX: 35.33 KG/M2 | WEIGHT: 192 LBS | SYSTOLIC BLOOD PRESSURE: 126 MMHG | DIASTOLIC BLOOD PRESSURE: 86 MMHG | HEART RATE: 53 BPM

## 2023-05-01 DIAGNOSIS — I10 ESSENTIAL HYPERTENSION: ICD-10-CM

## 2023-05-01 DIAGNOSIS — E66.01 MORBID (SEVERE) OBESITY DUE TO EXCESS CALORIES (HCC): Primary | ICD-10-CM

## 2023-05-01 DIAGNOSIS — I42.1 HOCM (HYPERTROPHIC OBSTRUCTIVE CARDIOMYOPATHY) (HCC): ICD-10-CM

## 2023-05-01 DIAGNOSIS — E78.2 HYPERLIPEMIA, MIXED: ICD-10-CM

## 2023-05-01 DIAGNOSIS — I44.7 LBBB (LEFT BUNDLE BRANCH BLOCK): ICD-10-CM

## 2023-05-01 DIAGNOSIS — I25.10 ATHEROSCLEROSIS OF NATIVE CORONARY ARTERY OF NATIVE HEART WITHOUT ANGINA PECTORIS: ICD-10-CM

## 2023-05-01 PROCEDURE — 3074F SYST BP LT 130 MM HG: CPT | Performed by: INTERNAL MEDICINE

## 2023-05-01 PROCEDURE — 1123F ACP DISCUSS/DSCN MKR DOCD: CPT | Performed by: INTERNAL MEDICINE

## 2023-05-01 PROCEDURE — G8427 DOCREV CUR MEDS BY ELIG CLIN: HCPCS | Performed by: INTERNAL MEDICINE

## 2023-05-01 PROCEDURE — 4004F PT TOBACCO SCREEN RCVD TLK: CPT | Performed by: INTERNAL MEDICINE

## 2023-05-01 PROCEDURE — 1090F PRES/ABSN URINE INCON ASSESS: CPT | Performed by: INTERNAL MEDICINE

## 2023-05-01 PROCEDURE — 3079F DIAST BP 80-89 MM HG: CPT | Performed by: INTERNAL MEDICINE

## 2023-05-01 PROCEDURE — 99214 OFFICE O/P EST MOD 30 MIN: CPT | Performed by: INTERNAL MEDICINE

## 2023-05-01 PROCEDURE — G8417 CALC BMI ABV UP PARAM F/U: HCPCS | Performed by: INTERNAL MEDICINE

## 2023-05-01 PROCEDURE — 93000 ELECTROCARDIOGRAM COMPLETE: CPT | Performed by: INTERNAL MEDICINE

## 2023-05-01 PROCEDURE — G8400 PT W/DXA NO RESULTS DOC: HCPCS | Performed by: INTERNAL MEDICINE

## 2023-05-01 RX ORDER — ROSUVASTATIN CALCIUM 20 MG/1
20 TABLET, COATED ORAL DAILY
Qty: 90 TABLET | Refills: 3 | Status: SHIPPED | OUTPATIENT
Start: 2023-05-01

## 2023-05-01 RX ORDER — LOSARTAN POTASSIUM 25 MG/1
50 TABLET ORAL DAILY
Qty: 90 TABLET | Refills: 3 | Status: SHIPPED | OUTPATIENT
Start: 2023-05-01

## 2023-05-01 RX ORDER — AMLODIPINE BESYLATE 5 MG/1
5 TABLET ORAL DAILY
Qty: 90 TABLET | Refills: 3 | Status: SHIPPED | OUTPATIENT
Start: 2023-05-01

## 2023-05-01 RX ORDER — LOSARTAN POTASSIUM 25 MG/1
50 TABLET ORAL DAILY
COMMUNITY
Start: 2022-09-07 | End: 2023-05-01 | Stop reason: SDUPTHER

## 2023-05-01 RX ORDER — FUROSEMIDE 20 MG/1
20 TABLET ORAL DAILY
Qty: 90 TABLET | Refills: 3 | Status: SHIPPED | OUTPATIENT
Start: 2023-05-01

## 2023-05-01 RX ORDER — AMLODIPINE BESYLATE 5 MG/1
5 TABLET ORAL DAILY
COMMUNITY
Start: 2022-09-07 | End: 2023-05-01 | Stop reason: SDUPTHER

## 2023-05-01 ASSESSMENT — ENCOUNTER SYMPTOMS
ORTHOPNEA: 0
WHEEZING: 0
SPUTUM PRODUCTION: 0
HEMATEMESIS: 0
ABDOMINAL PAIN: 0
SHORTNESS OF BREATH: 0
HOARSE VOICE: 0
HEMATOCHEZIA: 0
BOWEL INCONTINENCE: 0
DIARRHEA: 0
BLURRED VISION: 0
COLOR CHANGE: 0

## 2023-05-01 NOTE — PROGRESS NOTES
[facility] CARDIOLOGY  [address], [address]  PHONE: [phone]        23        NAME:  Ronaldo Rivera  : 1940  MRN: [id number]       SUBJECTIVE:   Ronaldo Rivera is a 78 y.o. female seen for a follow up visit regarding the following: The patient is a former patient of Dr William Acevedo. She has a hx of HOCM,primary hypertension,and CAD. She returns for follow up. Overall,she reports doing ok. She reports chronic PAYTON. Overall,she reports stable symptoms over the past 1 year. She denies chest pain,Syncope,or CHF. Chief Complaint   Patient presents with    Coronary Artery Disease     Previously seen by Dr. William Acevedo       HPI:    Hypertension  This is a chronic problem. The problem is unchanged. The problem is controlled. Associated symptoms include peripheral edema. Pertinent negatives include no anxiety, blurred vision, chest pain, headaches, malaise/fatigue, neck pain, orthopnea, palpitations, PND, shortness of breath or sweats. Past Medical History, Past Surgical History, Family history, Social History, and Medications were all reviewed with the patient today and updated as necessary.          Current Outpatient Medications:     vitamin D (CHOLECALCIFEROL) 25 MCG (1000 UT) TABS tablet, Take 1 tablet by mouth daily, Disp: , Rfl:     Multiple Vitamins-Minerals (PRESERVISION AREDS 2 PO), Take by mouth daily, Disp: , Rfl:     amLODIPine (NORVASC) 5 MG tablet, Take 1 tablet by mouth daily, Disp: 90 tablet, Rfl: 3    losartan (COZAAR) 25 MG tablet, Take 2 tablets by mouth daily, Disp: 90 tablet, Rfl: 3    furosemide (LASIX) 20 MG tablet, Take 1 tablet by mouth daily, Disp: 90 tablet, Rfl: 3    rosuvastatin (CRESTOR) 20 MG tablet, Take 1 tablet by mouth daily, Disp: 90 tablet, Rfl: 3    acetaminophen (TYLENOL) 500 MG tablet, Take 1 tablet by mouth every 6 hours as needed, Disp: , Rfl:     ascorbic acid (VITAMIN C) 500 MG tablet, Take 1 tablet by mouth daily, Disp:

## 2023-06-27 NOTE — PROGRESS NOTES
Hospitalist Progress Note   Admit Date:  3/6/2022  8:22 PM   Name:  Carmencita Aguilar   Age:  78 y.o. Sex:  female  :  1940   MRN:  [id number]   Room:  O1[facility]/    Presenting Complaint: Fall and Head Injury    Reason(s) for Admission: Fall [W19. XXXA]  Humerus fracture [S42.309A]  UTI (urinary tract infection) [N39.0]     Hospital Course & Interval History:     Carmencita Aguilar is a 78 y.o. female with a past medical history significant for hypertension, hypothyroidism and diabetes. The patient lives at home alone but has family members checking in on her. She walks with a walker she had a fall on Thursday. She denies feeling lightheaded dizzy or having any chest pain she states she has shortness of breath at baseline but it was not particularly worse at that time.     She stayed on the ground for about 2-1/2 to 3 hours until family member found her because she injured her left arm and was not able to transfer 90 alert bracelet properly. The family member called the medic alert people and they came and helped the patient from the ground to chair. They did not think that her arm was broken as she had the use of her hand and fingers. However over the last several days she is not being able to walk with her walker and therefore family members were not able to care for her. She was brought to the hospital today with them requesting that she be placed for rehab.     In the ER she was found to have a left proximal humeral fracture, and leukocytosis and a UTI.     Hospitalist service requested for further inpatient management. Subjective/24hr Events (22): Patient examined at bedside. No acute overnight events. Pain in left shoulder under control. Denies fevers/chills, chest pain or shortness of breath. ROS:  10 systems reviewed and negative except as noted above.      Assessment & Plan:     Principal Problem:    Humerus fracture (3/6/2022)  - nonsurgical management, per ortho  - Scheduling attempt #2    Patient called to schedule appoint.  No answer, call back  was given to return call.    PT/OT  - supportive management    # DOUG on CKD  - hold Lasix and losartan  - avoid nephrotoxic meds, NSAIDs, IV contrast  - strict I's/O's  - daily BMPs    # Acute cystitis   - CTX empirically   - follow cultures      Diabetic polyneuropathy ([facility] [address] (2/1/2017)  - home Lyrica      Type 2 diabetes mellitus ([facility] [address] (4/5/2017)  - hold home meds  - SSI and serial CBGs      Hypoactive thyroid (4/5/2017)   - Synthroid       Class 2 severe obesity due to excess calories with serious comorbidity in adult [facility]) (4/9/4158)  - complicates course of hospitalization      Essential hypertension (5/4/2017)   - hold losartan  - continue amlodipine      Coronary artery disease involving native coronary artery of native heart without angina pectoris (5/31/2017)   - ASA and statin      Adult failure to thrive (3/7/2022)   - due to generalized weakness and above    F/E/N: IVF resuscitation, replete electrolytes as needed, regular diet    Ppx: Lovenox for VTE    Code Status: FULL CODE    Disposition: pending clinical improvement with plan as above. PT/OT consults and PPD ordered. Discussed with patient at bedside. All questions answered. Hospital Problems as of 3/7/2022 Date Reviewed: 12/22/2021          Codes Class Noted - Resolved POA    Adult failure to thrive ICD-10-CM: R62.7  ICD-9-CM: 783.7  3/7/2022 - Present Unknown        UTI (urinary tract infection) ICD-10-CM: N39.0  ICD-9-CM: 599.0  3/6/2022 - Present Unknown        * (Principal) Humerus fracture ICD-10-CM: S42.309A  ICD-9-CM: 812.20  3/6/2022 - Present Unknown        Fall ICD-10-CM: W19. Hanh Barker  ICD-9-CM: E888.9  3/6/2022 - Present Unknown        Coronary artery disease involving native coronary artery of native heart without angina pectoris ICD-10-CM: I25.10  ICD-9-CM: 414.01  5/31/2017 - Present Yes        Essential hypertension ICD-10-CM: I10  ICD-9-CM: 401.9  5/4/2017 - Present Yes        Type 2 diabetes mellitus ([facility] ICD-10-CM: E11.9  ICD-9-CM: 250.00  4/5/2017 - Present Yes    Overview Signed 4/5/2017  2:43 PM by Brittany TEAGEU     Last Assessment & Plan:   Check hemoglobin A1c. SSI with FSBG checks qAC and qhs. Adjust insulin as needed based on FSBG results. Diabetic diet and education as needed. Hypoactive thyroid ICD-10-CM: E03.9  ICD-9-CM: 244.9  4/5/2017 - Present Yes    Overview Signed 4/5/2017  2:43 PM by Brittany TEAGUE     Last Assessment & Plan:   Continue home thyroid replacement meds. Check TSH in the AM and consider med adjustments based on results. Class 2 severe obesity due to excess calories with serious comorbidity in [facility]) ICD-10-CM: E66.01  ICD-9-CM: 278.01  4/5/2017 - Present Yes        Diabetic polyneuropathy ([facility] [address] ICD-10-CM: E11.42  ICD-9-CM: 250.60, 357.2  2/1/2017 - Present Yes    Overview Signed 4/5/2017  2:43 PM by Brittany TEAGUE     Last Assessment & Plan:   The patient's clinical history and presentation is more consistent with a diabetic neuropathy that is new onset in this patient with long-standing diabetes mellitus type 2 and diabetic nephropathy. I will start her on Lyrica therapy starting today. Physical therapy to evaluate for mobility on this drug. We will continue to follow closely. No clinical signs or symptoms of gout or cellulitis. Chronic kidney disease ICD-10-CM: N18.9  ICD-9-CM: 585.9  2/18/2016 - Present Yes    Overview Signed 4/5/2017  2:43 PM by Brittany TEAGUE     Last Assessment & Plan: This is likely from diabetic nephropathy and long-standing diabetes mellitus type 2  Continue current care and give IV fluids overnight. Repeat BMP in the morning.                    Objective:     Patient Vitals for the past 24 hrs:   Temp Pulse Resp BP SpO2   03/07/22 1145 98.2 °F (36.8 °C) 64 16 110/63 100 %   03/07/22 0820 97.9 °F (36.6 °C) 66 16 106/65 95 %   03/07/22 0435 99.3 °F (37.4 °C) 67 16 102/68 97 %   03/07/22 0114 98.6 °F (37 °C) 65 18 133/63 100 %   03/06/22 2301 -- 68 15 121/87 99 %   03/06/22 2102 -- -- -- -- 99 %   03/06/22 1800 97.5 °F (36.4 °C) 66 18 100/66 100 %     Oxygen Therapy  O2 Sat (%): 100 % (03/07/22 1145)  Pulse via Oximetry: 67 beats per minute (03/06/22 2301)  O2 Device: None (Room air) (03/07/22 1024)    Estimated body mass index is 37.18 kg/m² as calculated from the following:    Height as of this encounter: 5' 1.5\" (1.562 m). Weight as of this encounter: 90.7 kg (200 lb). Intake/Output Summary (Last 24 hours) at 3/7/2022 1532  Last data filed at 3/7/2022 1418  Gross per 24 hour   Intake 0 ml   Output 1200 ml   Net -1200 ml         Physical Exam:     Blood pressure 110/63, pulse 64, temperature 98.2 °F (36.8 °C), resp. rate 16, height 5' 1.5\" (1.562 m), weight 90.7 kg (200 lb), SpO2 100 %. General:    Well nourished. No overt distress  Head:  Normocephalic, atraumatic  Eyes:  Sclerae appear normal.  Pupils equally round. ENT:  Nares appear normal, no drainage. Moist oral mucosa  Neck:  No restricted ROM. Trachea midline   CV:   RRR. No jugular venous distension. Lungs:   CTAB. No wheezing, rhonchi, or rales. Respirations even, unlabored  Abdomen: Bowel sounds present. Soft, nontender, nondistended. Extremities: No cyanosis or clubbing. No edema  Skin:     No rashes and normal coloration. Warm and dry. Neuro:  CN II-XII grossly intact. Sensation intact. A&Ox3  Psych:  Normal mood and affect.       I have reviewed ordered lab tests and independently visualized imaging below:    Recent Labs:  Recent Results (from the past 48 hour(s))   TYPE & SCREEN    Collection Time: 03/06/22  9:42 PM   Result Value Ref Range    Crossmatch Expiration 03/09/2022,2359     ABO/Rh(D) B POSITIVE     Antibody screen NEG    PROCALCITONIN    Collection Time: 03/06/22  9:42 PM   Result Value Ref Range    Procalcitonin 0.55 (H) 0.00 - 0.49 ng/mL   CBC WITH AUTOMATED DIFF    Collection Time: 03/06/22  9:45 PM   Result Value Ref Range    WBC 19.1 (H) 4.3 - 11.1 K/uL    RBC 3.56 (L) 4.05 - 5.2 M/uL    HGB 10.6 (L) 11.7 - 15.4 g/dL    HCT 32.9 (L) 35.8 - 46.3 %    MCV 92.4 79.6 - 97.8 FL    MCH 29.8 26.1 - 32.9 PG    MCHC 32.2 31.4 - 35.0 g/dL    RDW 14.1 11.9 - 14.6 %    PLATELET 339 719 - 783 K/uL    MPV 10.4 9.4 - 12.3 FL    ABSOLUTE NRBC 0.00 0.0 - 0.2 K/uL    DF AUTOMATED      NEUTROPHILS 71 43 - 78 %    LYMPHOCYTES 14 13 - 44 %    MONOCYTES 14 (H) 4.0 - 12.0 %    EOSINOPHILS 0 (L) 0.5 - 7.8 %    BASOPHILS 0 0.0 - 2.0 %    IMMATURE GRANULOCYTES 1 0.0 - 5.0 %    ABS. NEUTROPHILS 13.7 (H) 1.7 - 8.2 K/UL    ABS. LYMPHOCYTES 2.6 0.5 - 4.6 K/UL    ABS. MONOCYTES 2.6 (H) 0.1 - 1.3 K/UL    ABS. EOSINOPHILS 0.0 0.0 - 0.8 K/UL    ABS. BASOPHILS 0.0 0.0 - 0.2 K/UL    ABS. IMM. GRANS. 0.2 0.0 - 0.5 K/UL   PROTHROMBIN TIME + INR    Collection Time: 03/06/22  9:45 PM   Result Value Ref Range    Prothrombin time 13.7 12.6 - 14.5 sec    INR 1.0     METABOLIC PANEL, COMPREHENSIVE    Collection Time: 03/06/22  9:45 PM   Result Value Ref Range    Sodium 134 (L) 136 - 145 mmol/L    Potassium 4.4 3.5 - 5.1 mmol/L    Chloride 102 98 - 107 mmol/L    CO2 23 21 - 32 mmol/L    Anion gap 9 7 - 16 mmol/L    Glucose 137 (H) 65 - 100 mg/dL    BUN 32 (H) 8 - 23 MG/DL    Creatinine 1.50 (H) 0.6 - 1.0 MG/DL    GFR est AA 43 (L) >60 ml/min/1.73m2    GFR est non-AA 35 (L) >60 ml/min/1.73m2    Calcium 8.6 8.3 - 10.4 MG/DL    Bilirubin, total 0.7 0.2 - 1.1 MG/DL    ALT (SGPT) 20 12 - 65 U/L    AST (SGOT) 34 15 - 37 U/L    Alk.  phosphatase 116 50 - 136 U/L    Protein, total 7.1 6.3 - 8.2 g/dL    Albumin 2.8 (L) 3.2 - 4.6 g/dL    Globulin 4.3 (H) 2.3 - 3.5 g/dL    A-G Ratio 0.7 (L) 1.2 - 3.5     MAGNESIUM    Collection Time: 03/06/22  9:45 PM   Result Value Ref Range    Magnesium 2.4 1.8 - 2.4 mg/dL   URINALYSIS W/ RFLX MICROSCOPIC    Collection Time: 03/06/22 10:35 PM   Result Value Ref Range    Color YELLOW      Appearance TURBID      Specific gravity 1.013 1.001 - 1.023      pH (UA) 5.5 5.0 - 9.0      Protein 30 (A) NEG mg/dL    Glucose Negative mg/dL    Ketone Negative NEG mg/dL    Bilirubin Negative NEG      Blood MODERATE (A) NEG      Urobilinogen 0.2 0.2 - 1.0 EU/dL    Nitrites Positive (A) NEG      Leukocyte Esterase LARGE (A) NEG      WBC >100 0 /hpf    Bacteria 1+ (H) 0 /hpf    Other observations RESULTS VERIFIED MANUALLY     CULTURE, URINE    Collection Time: 03/06/22 10:35 PM    Specimen: Clean catch; Urine   Result Value Ref Range    Special Requests: NO SPECIAL REQUESTS      Culture result:        NO GROWTH AFTER SHORT PERIOD OF INCUBATION. FURTHER RESULTS TO FOLLOW AFTER OVERNIGHT INCUBATION. GLUCOSE, POC    Collection Time: 03/07/22  8:07 AM   Result Value Ref Range    Glucose (POC) 120 (H) 65 - 100 mg/dL    Performed by Sindi    LACTIC ACID    Collection Time: 03/07/22  9:19 AM   Result Value Ref Range    Lactic acid 1.5 0.4 - 2.0 MMOL/L   NT-PRO BNP    Collection Time: 03/07/22  9:19 AM   Result Value Ref Range    NT pro-BNP 3,259 (H) <450 PG/ML   GLUCOSE, POC    Collection Time: 03/07/22 11:40 AM   Result Value Ref Range    Glucose (POC) 114 (H) 65 - 100 mg/dL    Performed by Bart Rudolph        All Micro Results     Procedure Component Value Units Date/Time    CULTURE, BLOOD [[id number]] Collected: 03/07/22 0919    Order Status: Completed Specimen: Blood Updated: 03/07/22 0938    CULTURE, BLOOD [[id number]] Collected: 03/07/22 0929    Order Status: Completed Specimen: Blood Updated: 03/07/22 0937    CULTURE, URINE [[id number]] Collected: 03/06/22 2235    Order Status: Completed Specimen: Urine from Clean catch Updated: 03/07/22 0840     Special Requests: NO SPECIAL REQUESTS        Culture result:       NO GROWTH AFTER SHORT PERIOD OF INCUBATION. FURTHER RESULTS TO FOLLOW AFTER OVERNIGHT INCUBATION. Other Studies:  XR CHEST SNGL V    Result Date: 3/7/2022  EXAM: XR CHEST SNGL V HISTORY: sob.   TECHNIQUE: Frontal chest. COMPARISON: 3/22/2021 FINDINGS: The cardiac silhouette, mediastinum, and pulmonary vasculature are within normal limits. There is no consolidation, pleural effusion, or pneumothorax. A fracture is seen in the proximal left humerus. No other significant osseous abnormalities are observed. No evidence of an acute intrathoracic process. XR SHOULDER LT AP/LAT MIN 2 V    Result Date: 3/6/2022  EXAM: 3 views of the left shoulder and 2 views of the left humerus. INDICATION: Fall Thursday with shoulder pain. COMPARISON: None. FINDINGS: Acute comminuted fracture transversely oriented of the surgical neck of the humerus with extension into the base of the greater tuberosity and with medial displacement of approximately 7 mm. Acromioclavicular joint is preserved. Humeral head remains in articulation with the glenoid. Left lung appears clear. 1. Acute comminuted fracture of the surgical neck of the humerus with medial displacement and with fracture lines extending into the base of the greater tuberosity. XR HUMERUS LT    Result Date: 3/6/2022  EXAM: 3 views of the left shoulder and 2 views of the left humerus. INDICATION: Fall Thursday with shoulder pain. COMPARISON: None. FINDINGS: Acute comminuted fracture transversely oriented of the surgical neck of the humerus with extension into the base of the greater tuberosity and with medial displacement of approximately 7 mm. Acromioclavicular joint is preserved. Humeral head remains in articulation with the glenoid. Left lung appears clear. 1. Acute comminuted fracture of the surgical neck of the humerus with medial displacement and with fracture lines extending into the base of the greater tuberosity. CT HEAD WO CONT    Result Date: 3/6/2022  NONCONTRAST HEAD CT CLINICAL HISTORY:  Near syncopal episode after a fall on Thursday. TECHNIQUE:  Axial images were obtained with spiral technique.   Radiation dose reduction was achieved using one or all of the following techniques: automated exposure control, weight-based dosing, iterative reconstruction. COMPARISON:  None. REPORT:   Standard noncontrast head CT demonstrates no definite intracranial mass effect, hemorrhage, or evidence of acute geographic infarction. The ventricles are normal in size and configuration, accounting for the patient's age. Orbits  and paranasal sinuses are clear where imaged. Bone windows demonstrate no definite fracture or destruction. NO ACUTE INTRACRANIAL ABNORMALITY OR CALVARIAL FRACTURE IDENTIFIED AT NONCONTRAST CT.     CT SPINE CERV WO CONT    Result Date: 3/6/2022  EXAM: CT of the cervical spine. INDICATION: Fall on Thursday, neck pain. COMPARISON: None. Multiple axial images were obtained through the cervical spine without intravenous contrast. Coronal and sagittal reformatted images were also reviewed. Radiation dose reduction techniques were used for this study: All CT scans performed at this facility use one or all of the following: Automated exposure control, adjustment of the mA and/or kVp according to patient's size, iterative reconstruction. FINDINGS: Advanced diffuse osteopenia. Moderate atlantodental interval degenerative change. Moderate multilevel facet arthropathy. No significant spondylolisthesis. Vertebral body heights appear preserved. No evidence of acute cervical spine fracture. No prevertebral soft tissue swelling. Moderate degenerative disc changes C5-C6 through C7-T1 otherwise mild multilevel degenerative disc changes of the cervical spine. 1. No evidence of acute cervical spine fracture.        Current Meds:  Current Facility-Administered Medications   Medication Dose Route Frequency    sodium chloride (NS) flush 5-40 mL  5-40 mL IntraVENous Q8H    sodium chloride (NS) flush 5-40 mL  5-40 mL IntraVENous PRN    acetaminophen (TYLENOL) tablet 650 mg  650 mg Oral Q6H PRN    Or    acetaminophen (TYLENOL) suppository 650 mg  650 mg Rectal Q6H PRN    polyethylene glycol (MIRALAX) packet 17 g  17 g Oral DAILY PRN    ondansetron (ZOFRAN ODT) tablet 4 mg  4 mg Oral Q8H PRN    Or    ondansetron (ZOFRAN) injection 4 mg  4 mg IntraVENous Q6H PRN    enoxaparin (LOVENOX) injection 30 mg  30 mg SubCUTAneous DAILY    tuberculin injection 5 Units  5 Units IntraDERMal ONCE    insulin lispro (HUMALOG) injection   SubCUTAneous AC&HS    acetaminophen (TYLENOL) tablet 500 mg  500 mg Oral Q6H PRN    amLODIPine (NORVASC) tablet 5 mg  5 mg Oral DAILY    aspirin delayed-release tablet 81 mg  81 mg Oral DAILY    cetirizine (ZYRTEC) tablet 10 mg  10 mg Oral DAILY PRN    dorzolamide-timoloL (COSOPT) 22.3-6.8 mg/mL ophthalmic solution 1 Drop  1 Drop Both Eyes BID    [Held by provider] furosemide (LASIX) tablet 20 mg  20 mg Oral DAILY    latanoprost (XALATAN) 0.005 % ophthalmic solution 1 Drop  1 Drop Both Eyes QHS    levothyroxine (SYNTHROID) tablet 50 mcg  50 mcg Oral ACB    [Held by provider] losartan (COZAAR) tablet 100 mg  100 mg Oral DAILY    metoprolol succinate (TOPROL-XL) XL tablet 100 mg  100 mg Oral DAILY    pantoprazole (PROTONIX) tablet 40 mg  40 mg Oral ACB    pregabalin (LYRICA) capsule 50 mg  50 mg Oral BID    rosuvastatin (CRESTOR) tablet 20 mg  20 mg Oral QHS       Signed: Guadalupe Zheng DO    Part of this note may have been written by using a voice dictation software. The note has been proof read but may still contain some grammatical/other typographical errors.

## 2023-10-04 NOTE — PROGRESS NOTES
03/10/22 1823   Dual Skin Pressure Injury Assessment   Dual Skin Pressure Injury Assessment X   Second Care Provider (Based on [address] [place] [address]) Vincent Sanders RN   Sacrum  Mid   Skin Integumentary   Skin Integumentary (WDL) X    Pressure  Injury Documentation Pressure Injury Noted-See Wound LDA to Document   Skin Integrity Wound (add Wound LDA)  (sacrum)   Skin Color Appropriate for ethnicity; Red  (red sacrum)   Skin Condition/Temp Dry;Fragile; Warm   Turgor Epidermis thin w/ loss of subcut tissue   Wound Prevention and Protection Methods   Orientation of Wound Prevention Mid;Posterior   Location of Wound Prevention Sacrum/Coccyx   Dressing Present  Yes; Intact, not due to be changed   Dressing Status Intact   Wound Offloading (Prevention Methods) Bed, pressure reduction mattress; Foam silicone;Repositioning no

## 2023-11-02 ENCOUNTER — OFFICE VISIT (OUTPATIENT)
Age: 83
End: 2023-11-02
Payer: MEDICARE

## 2023-11-02 VITALS
HEART RATE: 54 BPM | DIASTOLIC BLOOD PRESSURE: 64 MMHG | SYSTOLIC BLOOD PRESSURE: 148 MMHG | BODY MASS INDEX: 33.86 KG/M2 | WEIGHT: 184 LBS | HEIGHT: 62 IN

## 2023-11-02 DIAGNOSIS — I44.7 LBBB (LEFT BUNDLE BRANCH BLOCK): ICD-10-CM

## 2023-11-02 DIAGNOSIS — I42.1 HOCM (HYPERTROPHIC OBSTRUCTIVE CARDIOMYOPATHY) (HCC): ICD-10-CM

## 2023-11-02 DIAGNOSIS — I25.10 ATHEROSCLEROSIS OF NATIVE CORONARY ARTERY OF NATIVE HEART WITHOUT ANGINA PECTORIS: Primary | ICD-10-CM

## 2023-11-02 DIAGNOSIS — I10 ESSENTIAL HYPERTENSION: ICD-10-CM

## 2023-11-02 PROCEDURE — 3078F DIAST BP <80 MM HG: CPT | Performed by: INTERNAL MEDICINE

## 2023-11-02 PROCEDURE — 1090F PRES/ABSN URINE INCON ASSESS: CPT | Performed by: INTERNAL MEDICINE

## 2023-11-02 PROCEDURE — G8417 CALC BMI ABV UP PARAM F/U: HCPCS | Performed by: INTERNAL MEDICINE

## 2023-11-02 PROCEDURE — G8484 FLU IMMUNIZE NO ADMIN: HCPCS | Performed by: INTERNAL MEDICINE

## 2023-11-02 PROCEDURE — G8427 DOCREV CUR MEDS BY ELIG CLIN: HCPCS | Performed by: INTERNAL MEDICINE

## 2023-11-02 PROCEDURE — 1123F ACP DISCUSS/DSCN MKR DOCD: CPT | Performed by: INTERNAL MEDICINE

## 2023-11-02 PROCEDURE — 99214 OFFICE O/P EST MOD 30 MIN: CPT | Performed by: INTERNAL MEDICINE

## 2023-11-02 PROCEDURE — 1036F TOBACCO NON-USER: CPT | Performed by: INTERNAL MEDICINE

## 2023-11-02 PROCEDURE — 3077F SYST BP >= 140 MM HG: CPT | Performed by: INTERNAL MEDICINE

## 2023-11-02 PROCEDURE — G8400 PT W/DXA NO RESULTS DOC: HCPCS | Performed by: INTERNAL MEDICINE

## 2023-11-02 ASSESSMENT — ENCOUNTER SYMPTOMS
ABDOMINAL PAIN: 0
HEMATOCHEZIA: 0
WHEEZING: 0
BLURRED VISION: 0
HOARSE VOICE: 0
COLOR CHANGE: 0
BOWEL INCONTINENCE: 0
SHORTNESS OF BREATH: 0
HEMATEMESIS: 0
SPUTUM PRODUCTION: 0
CHEST TIGHTNESS: 0
DIARRHEA: 0
ORTHOPNEA: 0

## 2023-11-02 NOTE — PROGRESS NOTES
[facility] CARDIOLOGY  [address] [address]  PHONE: [phone]        23        NAME:  Tino Tellez  : 1940  MRN: [id number]       SUBJECTIVE:   Tino Tellez is a 78 y.o. female seen for a follow up visit regarding the following: HOCM,CAD,and primary hypertension. She returns for follow up. Overall,she reports doing ok. Chief Complaint   Patient presents with    Coronary Artery Disease       HPI:    Coronary Artery Disease  Presents for follow-up visit. Pertinent negatives include no chest pain, chest pressure, chest tightness, dizziness, leg swelling, muscle weakness, palpitations, shortness of breath or weight gain. The symptoms have been stable. Past Medical History, Past Surgical History, Family history, Social History, and Medications were all reviewed with the patient today and updated as necessary.          Current Outpatient Medications:     vitamin D (CHOLECALCIFEROL) 25 MCG (1000 UT) TABS tablet, Take 1 tablet by mouth daily, Disp: , Rfl:     Multiple Vitamins-Minerals (PRESERVISION AREDS 2 PO), Take by mouth daily, Disp: , Rfl:     amLODIPine (NORVASC) 5 MG tablet, Take 1 tablet by mouth daily, Disp: 90 tablet, Rfl: 3    losartan (COZAAR) 25 MG tablet, Take 2 tablets by mouth daily, Disp: 90 tablet, Rfl: 3    furosemide (LASIX) 20 MG tablet, Take 1 tablet by mouth daily, Disp: 90 tablet, Rfl: 3    rosuvastatin (CRESTOR) 20 MG tablet, Take 1 tablet by mouth daily, Disp: 90 tablet, Rfl: 3    acetaminophen (TYLENOL) 500 MG tablet, Take 1 tablet by mouth every 6 hours as needed, Disp: , Rfl:     ascorbic acid (VITAMIN C) 500 MG tablet, Take 1 tablet by mouth daily, Disp: , Rfl:     aspirin 81 MG EC tablet, Take 1 tablet by mouth daily, Disp: , Rfl:     cetirizine (ZYRTEC) 10 MG tablet, Take 1 tablet by mouth daily as needed, Disp: , Rfl:     cyanocobalamin 1000 MCG tablet, Take 2.5 tablets by mouth daily, Disp: , Rfl:     diphenhydrAMINE

## 2024-05-23 ENCOUNTER — OFFICE VISIT (OUTPATIENT)
Age: 84
End: 2024-05-23
Payer: MEDICARE

## 2024-05-23 VITALS
SYSTOLIC BLOOD PRESSURE: 120 MMHG | BODY MASS INDEX: 32.57 KG/M2 | HEART RATE: 68 BPM | DIASTOLIC BLOOD PRESSURE: 84 MMHG | WEIGHT: 177 LBS | HEIGHT: 62 IN

## 2024-05-23 DIAGNOSIS — I44.7 LBBB (LEFT BUNDLE BRANCH BLOCK): ICD-10-CM

## 2024-05-23 DIAGNOSIS — E78.2 HYPERLIPEMIA, MIXED: ICD-10-CM

## 2024-05-23 DIAGNOSIS — I10 ESSENTIAL HYPERTENSION: ICD-10-CM

## 2024-05-23 DIAGNOSIS — I25.10 ATHEROSCLEROSIS OF NATIVE CORONARY ARTERY OF NATIVE HEART WITHOUT ANGINA PECTORIS: Primary | ICD-10-CM

## 2024-05-23 DIAGNOSIS — I42.1 HOCM (HYPERTROPHIC OBSTRUCTIVE CARDIOMYOPATHY) (HCC): ICD-10-CM

## 2024-05-23 PROCEDURE — G8427 DOCREV CUR MEDS BY ELIG CLIN: HCPCS | Performed by: INTERNAL MEDICINE

## 2024-05-23 PROCEDURE — G8417 CALC BMI ABV UP PARAM F/U: HCPCS | Performed by: INTERNAL MEDICINE

## 2024-05-23 PROCEDURE — 1036F TOBACCO NON-USER: CPT | Performed by: INTERNAL MEDICINE

## 2024-05-23 PROCEDURE — 3079F DIAST BP 80-89 MM HG: CPT | Performed by: INTERNAL MEDICINE

## 2024-05-23 PROCEDURE — 1123F ACP DISCUSS/DSCN MKR DOCD: CPT | Performed by: INTERNAL MEDICINE

## 2024-05-23 PROCEDURE — 99214 OFFICE O/P EST MOD 30 MIN: CPT | Performed by: INTERNAL MEDICINE

## 2024-05-23 PROCEDURE — 1090F PRES/ABSN URINE INCON ASSESS: CPT | Performed by: INTERNAL MEDICINE

## 2024-05-23 PROCEDURE — 3074F SYST BP LT 130 MM HG: CPT | Performed by: INTERNAL MEDICINE

## 2024-05-23 PROCEDURE — G8400 PT W/DXA NO RESULTS DOC: HCPCS | Performed by: INTERNAL MEDICINE

## 2024-05-23 PROCEDURE — 93000 ELECTROCARDIOGRAM COMPLETE: CPT | Performed by: INTERNAL MEDICINE

## 2024-05-23 RX ORDER — METOPROLOL SUCCINATE 50 MG/1
50 TABLET, EXTENDED RELEASE ORAL DAILY
Qty: 90 TABLET | Refills: 3 | Status: SHIPPED | OUTPATIENT
Start: 2024-05-23

## 2024-05-23 RX ORDER — LOSARTAN POTASSIUM 25 MG/1
50 TABLET ORAL DAILY
Qty: 90 TABLET | Refills: 3 | Status: SHIPPED | OUTPATIENT
Start: 2024-05-23

## 2024-05-23 RX ORDER — ROSUVASTATIN CALCIUM 20 MG/1
20 TABLET, COATED ORAL DAILY
Qty: 90 TABLET | Refills: 3 | Status: SHIPPED | OUTPATIENT
Start: 2024-05-23

## 2024-05-23 RX ORDER — AMLODIPINE BESYLATE 5 MG/1
5 TABLET ORAL DAILY
Qty: 90 TABLET | Refills: 3 | Status: SHIPPED | OUTPATIENT
Start: 2024-05-23

## 2024-05-23 RX ORDER — FUROSEMIDE 20 MG/1
20 TABLET ORAL DAILY
Qty: 90 TABLET | Refills: 3 | Status: SHIPPED | OUTPATIENT
Start: 2024-05-23

## 2024-05-23 ASSESSMENT — ENCOUNTER SYMPTOMS
WHEEZING: 0
BOWEL INCONTINENCE: 0
HOARSE VOICE: 0
ORTHOPNEA: 0
DIARRHEA: 0
COLOR CHANGE: 0
SHORTNESS OF BREATH: 0
SPUTUM PRODUCTION: 0
ABDOMINAL PAIN: 0
CHEST TIGHTNESS: 0
HEMATOCHEZIA: 0
HEMATEMESIS: 0
BLURRED VISION: 0

## 2024-05-23 NOTE — PROGRESS NOTES
Musculoskeletal:         General: Normal range of motion.      Cervical back: Normal range of motion and neck supple.   Skin:     General: Skin is warm and dry.   Neurological:      General: No focal deficit present.      Mental Status: She is alert and oriented to person, place, and time.   Psychiatric:         Mood and Affect: Mood normal.         Medical problems and test results were reviewed with the patient today.     Recent Results (from the past 672 hour(s))   Echo (TTE) complete (PRN contrast/bubble/strain/3D)    Collection Time: 05/06/24 12:45 PM   Result Value Ref Range    LV ESV A4C 38 mL    LV EDV A4C 99 mL    LV ESV A2C 48 mL    LV EDV A2C 110 mL    LV E' Septal Velocity 3 cm/s    LVOT Diameter 2.0 cm    LV E' Lateral Velocity 3 cm/s    LVPWd 1.3 (A) 0.6 - 0.9 cm    IVSd 1.6 (A) 0.6 - 0.9 cm    LVIDs 2.4 cm    LVIDd 4.1 3.9 - 5.3 cm    EF BP 59 55 - 100 %    LV Ejection Fraction A4C 62 %    LV Ejection Fraction A2C 56 %    RVIDd 2.1 cm    LA Diameter 4.6 cm    AV Peak Gradient 24 mmHg    AV Peak Velocity 2.5 m/s    AV Mean Gradient 14 mmHg    AV VTI 57.4 cm    AV Cusp Mmode 1.7 cm    AV Mean Velocity 1.8 m/s    MV E Velocity 1.17 m/s    MV E Wave Deceleration Time 441.0 ms    MV A Velocity 1.42 m/s    TR Max Velocity 2.59 m/s    TR Peak Gradient 27 mmHg    Ascending Aorta 3.2 cm    Aortic Root 2.8 cm    Body Surface Area 1.9 m2    Fractional Shortening 2D 41 28 - 44 %    LV ESV Index A4C 21 mL/m2    LV EDV Index A4C 54 mL/m2    LV ESV Index A2C 26 mL/m2    LV EDV Index A2C 60 mL/m2    LVIDd Index 2.24 cm/m2    LVIDs Index 1.31 cm/m2    LV RWT Ratio 0.63     LV Mass 2D 228.6 (A) 67 - 162 g    LV Mass 2D Index 124.9 (A) 43 - 95 g/m2    MV E/A 0.82     E/E' Ratio (Averaged) 39.00     E/E' Lateral 39.00     LVOT Area 3.1 cm2    LA Size Index 2.51 cm/m2    LA/AO Root Ratio 1.64     Ao Root Index 1.53 cm/m2    Ascending Aorta Index 1.75 cm/m2    LA Volume BP 71 (A) 22 - 52 mL    LA Volume Index BP 39 (A)

## 2025-03-20 ENCOUNTER — OFFICE VISIT (OUTPATIENT)
Dept: ORTHOPEDIC SURGERY | Age: 85
End: 2025-03-20
Payer: MEDICARE

## 2025-03-20 VITALS — BODY MASS INDEX: 32.57 KG/M2 | WEIGHT: 177 LBS | HEIGHT: 62 IN

## 2025-03-20 DIAGNOSIS — M43.16 SPONDYLOLISTHESIS OF LUMBAR REGION: ICD-10-CM

## 2025-03-20 DIAGNOSIS — M47.816 LUMBAR SPONDYLOSIS: Primary | ICD-10-CM

## 2025-03-20 PROCEDURE — G8428 CUR MEDS NOT DOCUMENT: HCPCS | Performed by: PHYSICIAN ASSISTANT

## 2025-03-20 PROCEDURE — 99204 OFFICE O/P NEW MOD 45 MIN: CPT | Performed by: PHYSICIAN ASSISTANT

## 2025-03-20 PROCEDURE — G8417 CALC BMI ABV UP PARAM F/U: HCPCS | Performed by: PHYSICIAN ASSISTANT

## 2025-03-20 PROCEDURE — 1036F TOBACCO NON-USER: CPT | Performed by: PHYSICIAN ASSISTANT

## 2025-03-20 PROCEDURE — 1123F ACP DISCUSS/DSCN MKR DOCD: CPT | Performed by: PHYSICIAN ASSISTANT

## 2025-03-20 PROCEDURE — 1090F PRES/ABSN URINE INCON ASSESS: CPT | Performed by: PHYSICIAN ASSISTANT

## 2025-03-20 NOTE — PROGRESS NOTES
delayed release capsule Take 1 capsule by mouth daily (Patient not taking: Reported on 5/23/2024)      pregabalin (LYRICA) 50 MG capsule Take 1 capsule by mouth 2 times daily.       No current facility-administered medications for this visit.                   Review of Systems:  As per HPI.  Pertinent positives and negatives are addressed with the patient, particularly those related to musculoskeletal concerns.   Other non-emergent concerns were referred back to the primary care physician.      PHYSICAL EXAMINATION:     The patient appears their stated age and they are in no distress.  Ht 1.562 m (5' 1.5\")   Wt 80.3 kg (177 lb)   BMI 32.90 kg/m²         Neuro:   Reflexes  Knees: Muted  Ankles: Muted        Sensory    Sensation to light touch intact L3-S1      Motor    Hip Flexion: Grossly normal    Knee Extension:  grossly normal    Ankle Dorsiflexion: grossly normal    Great Toe Exension:  grossly normal       Point tenderness: Negative    Gait: Resting comfortably in a wheelchair               IMAGING:     MRI Result (most recent):  No results found for this or any previous visit from the past 3650 days.        AP and lateral views of the lumbar spine reveal fair lordosis.  Grade 1 spondylolisthesis at L4-5.  Multilevel mild lumbar spondylosis and DDD at L4-5          ASSESSMENT AND PLAN:     The patient has primarily complaints of poor function and debility.  She is really having her biggest issue with just functional capacity and self-care.  She does not have much back or leg pain per se.  No significant weakness or gross neurologic deficits on exam.  Given pain is not a primary issue for her I recommended against lumbar injection therapy for the time being.  She can continue Tylenol as needed.  I did recommend a course of physical therapy to help her functional improvement but the patient and her family are most interested in in-home physical therapy, and I encouraged her to reach out to her health insurance

## 2025-04-01 ENCOUNTER — HOSPITAL ENCOUNTER (INPATIENT)
Age: 85
LOS: 3 days | Discharge: HOME | End: 2025-04-04
Attending: EMERGENCY MEDICINE | Admitting: FAMILY MEDICINE
Payer: MEDICARE

## 2025-04-01 ENCOUNTER — APPOINTMENT (OUTPATIENT)
Dept: CT IMAGING | Age: 85
End: 2025-04-01
Payer: MEDICARE

## 2025-04-01 DIAGNOSIS — E87.6 HYPOKALEMIA: ICD-10-CM

## 2025-04-01 DIAGNOSIS — E83.42 HYPOMAGNESEMIA: ICD-10-CM

## 2025-04-01 DIAGNOSIS — R19.7 DIARRHEA, UNSPECIFIED TYPE: Primary | ICD-10-CM

## 2025-04-01 DIAGNOSIS — R53.1 GENERAL WEAKNESS: ICD-10-CM

## 2025-04-01 LAB
ALBUMIN SERPL-MCNC: 2.5 G/DL (ref 3.2–4.6)
ALBUMIN/GLOB SERPL: 0.8 (ref 1–1.9)
ALP SERPL-CCNC: 110 U/L (ref 35–104)
ALT SERPL-CCNC: 9 U/L (ref 8–45)
ANION GAP SERPL CALC-SCNC: 13 MMOL/L (ref 7–16)
ANION GAP SERPL CALC-SCNC: 19 MMOL/L (ref 7–16)
AST SERPL-CCNC: 25 U/L (ref 15–37)
BACTERIA URNS QL MICRO: ABNORMAL /HPF
BASOPHILS # BLD: 0.04 K/UL (ref 0–0.2)
BASOPHILS NFR BLD: 0.5 % (ref 0–2)
BILIRUB SERPL-MCNC: 0.5 MG/DL (ref 0–1.2)
BILIRUB UR QL: NEGATIVE
BUN SERPL-MCNC: 11 MG/DL (ref 8–23)
BUN SERPL-MCNC: 12 MG/DL (ref 8–23)
CALCIUM SERPL-MCNC: 7.6 MG/DL (ref 8.8–10.2)
CALCIUM SERPL-MCNC: 8.2 MG/DL (ref 8.8–10.2)
CASTS URNS QL MICRO: 0 /LPF
CHLORIDE SERPL-SCNC: 103 MMOL/L (ref 98–107)
CHLORIDE SERPL-SCNC: 109 MMOL/L (ref 98–107)
CO2 SERPL-SCNC: 13 MMOL/L (ref 20–29)
CO2 SERPL-SCNC: 25 MMOL/L (ref 20–29)
CREAT SERPL-MCNC: 1.25 MG/DL (ref 0.6–1.1)
CREAT SERPL-MCNC: 1.38 MG/DL (ref 0.6–1.1)
CRYSTALS URNS QL MICRO: 0 /LPF
DIFFERENTIAL METHOD BLD: ABNORMAL
EKG ATRIAL RATE: 67 BPM
EKG DIAGNOSIS: NORMAL
EKG P AXIS: 39 DEGREES
EKG P-R INTERVAL: 190 MS
EKG Q-T INTERVAL: 508 MS
EKG QRS DURATION: 166 MS
EKG QTC CALCULATION (BAZETT): 541 MS
EKG R AXIS: -27 DEGREES
EKG T AXIS: 140 DEGREES
EKG VENTRICULAR RATE: 68 BPM
EOSINOPHIL # BLD: 0.17 K/UL (ref 0–0.8)
EOSINOPHIL NFR BLD: 2.1 % (ref 0.5–7.8)
EPI CELLS #/AREA URNS HPF: ABNORMAL /HPF
ERYTHROCYTE [DISTWIDTH] IN BLOOD BY AUTOMATED COUNT: 19.6 % (ref 11.9–14.6)
GLOBULIN SER CALC-MCNC: 3.2 G/DL (ref 2.3–3.5)
GLUCOSE BLD STRIP.AUTO-MCNC: 71 MG/DL (ref 65–100)
GLUCOSE SERPL-MCNC: 101 MG/DL (ref 70–99)
GLUCOSE SERPL-MCNC: 105 MG/DL (ref 70–99)
GLUCOSE UR QL STRIP.AUTO: NEGATIVE MG/DL
HCT VFR BLD AUTO: 35.9 % (ref 35.8–46.3)
HGB BLD-MCNC: 12.4 G/DL (ref 11.7–15.4)
IMM GRANULOCYTES # BLD AUTO: 0.03 K/UL (ref 0–0.5)
IMM GRANULOCYTES NFR BLD AUTO: 0.4 % (ref 0–5)
KETONES UR-MCNC: NEGATIVE MG/DL
LEUKOCYTE ESTERASE UR QL STRIP: ABNORMAL
LIPASE SERPL-CCNC: 36 U/L (ref 13–60)
LYMPHOCYTES # BLD: 4.07 K/UL (ref 0.5–4.6)
LYMPHOCYTES NFR BLD: 49.3 % (ref 13–44)
MAGNESIUM SERPL-MCNC: 1.5 MG/DL (ref 1.8–2.4)
MCH RBC QN AUTO: 34.7 PG (ref 26.1–32.9)
MCHC RBC AUTO-ENTMCNC: 34.5 G/DL (ref 31.4–35)
MCV RBC AUTO: 100.6 FL (ref 82–102)
MONOCYTES # BLD: 0.62 K/UL (ref 0.1–1.3)
MONOCYTES NFR BLD: 7.5 % (ref 4–12)
MUCOUS THREADS URNS QL MICRO: 0 /LPF
NEUTS SEG # BLD: 3.33 K/UL (ref 1.7–8.2)
NEUTS SEG NFR BLD: 40.2 % (ref 43–78)
NITRITE UR QL: NEGATIVE
NRBC # BLD: 0 K/UL (ref 0–0.2)
OTHER OBSERVATIONS: ABNORMAL
PH UR: 6 (ref 5–9)
PLATELET # BLD AUTO: 145 K/UL (ref 150–450)
PMV BLD AUTO: 9.7 FL (ref 9.4–12.3)
POTASSIUM SERPL-SCNC: 2.6 MMOL/L (ref 3.5–5.1)
POTASSIUM SERPL-SCNC: 2.8 MMOL/L (ref 3.5–5.1)
POTASSIUM SERPL-SCNC: 4.1 MMOL/L (ref 3.5–5.1)
PROT SERPL-MCNC: 5.8 G/DL (ref 6.3–8.2)
PROT UR QL: ABNORMAL MG/DL
RBC # BLD AUTO: 3.57 M/UL (ref 4.05–5.2)
RBC # UR STRIP: ABNORMAL
RBC #/AREA URNS HPF: ABNORMAL /HPF
SERVICE CMNT-IMP: ABNORMAL
SERVICE CMNT-IMP: NORMAL
SODIUM SERPL-SCNC: 140 MMOL/L (ref 136–145)
SODIUM SERPL-SCNC: 141 MMOL/L (ref 136–145)
SP GR UR: <=1.005 (ref 1–1.02)
UROBILINOGEN UR QL: 0.2 EU/DL (ref 0.2–1)
WBC # BLD AUTO: 8.3 K/UL (ref 4.3–11.1)
WBC URNS QL MICRO: >100 /HPF

## 2025-04-01 PROCEDURE — 84132 ASSAY OF SERUM POTASSIUM: CPT

## 2025-04-01 PROCEDURE — 74177 CT ABD & PELVIS W/CONTRAST: CPT

## 2025-04-01 PROCEDURE — 36415 COLL VENOUS BLD VENIPUNCTURE: CPT

## 2025-04-01 PROCEDURE — 99285 EMERGENCY DEPT VISIT HI MDM: CPT

## 2025-04-01 PROCEDURE — 87493 C DIFF AMPLIFIED PROBE: CPT

## 2025-04-01 PROCEDURE — 6360000002 HC RX W HCPCS: Performed by: FAMILY MEDICINE

## 2025-04-01 PROCEDURE — 93010 ELECTROCARDIOGRAM REPORT: CPT | Performed by: INTERNAL MEDICINE

## 2025-04-01 PROCEDURE — 83735 ASSAY OF MAGNESIUM: CPT

## 2025-04-01 PROCEDURE — 81001 URINALYSIS AUTO W/SCOPE: CPT

## 2025-04-01 PROCEDURE — 87899 AGENT NOS ASSAY W/OPTIC: CPT

## 2025-04-01 PROCEDURE — 85025 COMPLETE CBC W/AUTO DIFF WBC: CPT

## 2025-04-01 PROCEDURE — 87045 FECES CULTURE AEROBIC BACT: CPT

## 2025-04-01 PROCEDURE — 87086 URINE CULTURE/COLONY COUNT: CPT

## 2025-04-01 PROCEDURE — 83690 ASSAY OF LIPASE: CPT

## 2025-04-01 PROCEDURE — 2500000003 HC RX 250 WO HCPCS: Performed by: EMERGENCY MEDICINE

## 2025-04-01 PROCEDURE — 96365 THER/PROPH/DIAG IV INF INIT: CPT

## 2025-04-01 PROCEDURE — 96361 HYDRATE IV INFUSION ADD-ON: CPT

## 2025-04-01 PROCEDURE — 96375 TX/PRO/DX INJ NEW DRUG ADDON: CPT

## 2025-04-01 PROCEDURE — 6360000004 HC RX CONTRAST MEDICATION: Performed by: EMERGENCY MEDICINE

## 2025-04-01 PROCEDURE — 2580000003 HC RX 258: Performed by: FAMILY MEDICINE

## 2025-04-01 PROCEDURE — 82962 GLUCOSE BLOOD TEST: CPT

## 2025-04-01 PROCEDURE — 87427 SHIGA-LIKE TOXIN AG IA: CPT

## 2025-04-01 PROCEDURE — 87046 STOOL CULTR AEROBIC BACT EA: CPT

## 2025-04-01 PROCEDURE — 93005 ELECTROCARDIOGRAM TRACING: CPT | Performed by: EMERGENCY MEDICINE

## 2025-04-01 PROCEDURE — 6360000002 HC RX W HCPCS: Performed by: EMERGENCY MEDICINE

## 2025-04-01 PROCEDURE — 80053 COMPREHEN METABOLIC PANEL: CPT

## 2025-04-01 PROCEDURE — 2500000003 HC RX 250 WO HCPCS: Performed by: FAMILY MEDICINE

## 2025-04-01 PROCEDURE — 2580000003 HC RX 258: Performed by: EMERGENCY MEDICINE

## 2025-04-01 PROCEDURE — 6370000000 HC RX 637 (ALT 250 FOR IP): Performed by: FAMILY MEDICINE

## 2025-04-01 PROCEDURE — 1100000000 HC RM PRIVATE

## 2025-04-01 RX ORDER — ASPIRIN 81 MG/1
81 TABLET ORAL DAILY
Status: DISCONTINUED | OUTPATIENT
Start: 2025-04-02 | End: 2025-04-04 | Stop reason: HOSPADM

## 2025-04-01 RX ORDER — POTASSIUM CHLORIDE 7.45 MG/ML
10 INJECTION INTRAVENOUS
Status: DISPENSED | OUTPATIENT
Start: 2025-04-01 | End: 2025-04-01

## 2025-04-01 RX ORDER — POLYETHYLENE GLYCOL 3350 17 G/17G
17 POWDER, FOR SOLUTION ORAL DAILY PRN
Status: DISCONTINUED | OUTPATIENT
Start: 2025-04-01 | End: 2025-04-04 | Stop reason: HOSPADM

## 2025-04-01 RX ORDER — POTASSIUM CHLORIDE 1500 MG/1
40 TABLET, EXTENDED RELEASE ORAL PRN
Status: DISCONTINUED | OUTPATIENT
Start: 2025-04-01 | End: 2025-04-04 | Stop reason: HOSPADM

## 2025-04-01 RX ORDER — FAMOTIDINE 20 MG/1
10 TABLET, FILM COATED ORAL DAILY PRN
Status: DISCONTINUED | OUTPATIENT
Start: 2025-04-01 | End: 2025-04-04 | Stop reason: HOSPADM

## 2025-04-01 RX ORDER — MAGNESIUM SULFATE IN WATER 40 MG/ML
2000 INJECTION, SOLUTION INTRAVENOUS
Status: COMPLETED | OUTPATIENT
Start: 2025-04-01 | End: 2025-04-01

## 2025-04-01 RX ORDER — LATANOPROST 50 UG/ML
1 SOLUTION/ DROPS OPHTHALMIC NIGHTLY
Status: DISCONTINUED | OUTPATIENT
Start: 2025-04-01 | End: 2025-04-04 | Stop reason: HOSPADM

## 2025-04-01 RX ORDER — SODIUM CHLORIDE 0.9 % (FLUSH) 0.9 %
5-40 SYRINGE (ML) INJECTION PRN
Status: DISCONTINUED | OUTPATIENT
Start: 2025-04-01 | End: 2025-04-04 | Stop reason: HOSPADM

## 2025-04-01 RX ORDER — MAGNESIUM SULFATE IN WATER 40 MG/ML
2000 INJECTION, SOLUTION INTRAVENOUS PRN
Status: DISCONTINUED | OUTPATIENT
Start: 2025-04-01 | End: 2025-04-04 | Stop reason: HOSPADM

## 2025-04-01 RX ORDER — BISACODYL 10 MG
10 SUPPOSITORY, RECTAL RECTAL DAILY PRN
Status: DISCONTINUED | OUTPATIENT
Start: 2025-04-01 | End: 2025-04-04 | Stop reason: HOSPADM

## 2025-04-01 RX ORDER — AMLODIPINE BESYLATE 5 MG/1
5 TABLET ORAL DAILY
Status: DISCONTINUED | OUTPATIENT
Start: 2025-04-02 | End: 2025-04-02

## 2025-04-01 RX ORDER — SODIUM CHLORIDE 9 MG/ML
INJECTION, SOLUTION INTRAVENOUS PRN
Status: DISCONTINUED | OUTPATIENT
Start: 2025-04-01 | End: 2025-04-04 | Stop reason: HOSPADM

## 2025-04-01 RX ORDER — METOPROLOL SUCCINATE 50 MG/1
50 TABLET, EXTENDED RELEASE ORAL DAILY
Status: DISCONTINUED | OUTPATIENT
Start: 2025-04-02 | End: 2025-04-04 | Stop reason: HOSPADM

## 2025-04-01 RX ORDER — FUROSEMIDE 20 MG/1
20 TABLET ORAL DAILY
Status: DISCONTINUED | OUTPATIENT
Start: 2025-04-02 | End: 2025-04-04 | Stop reason: HOSPADM

## 2025-04-01 RX ORDER — POTASSIUM CHLORIDE 7.45 MG/ML
10 INJECTION INTRAVENOUS PRN
Status: DISCONTINUED | OUTPATIENT
Start: 2025-04-01 | End: 2025-04-04 | Stop reason: HOSPADM

## 2025-04-01 RX ORDER — IOPAMIDOL 755 MG/ML
100 INJECTION, SOLUTION INTRAVASCULAR
Status: COMPLETED | OUTPATIENT
Start: 2025-04-01 | End: 2025-04-01

## 2025-04-01 RX ORDER — SODIUM CHLORIDE 0.9 % (FLUSH) 0.9 %
5-40 SYRINGE (ML) INJECTION EVERY 12 HOURS SCHEDULED
Status: DISCONTINUED | OUTPATIENT
Start: 2025-04-01 | End: 2025-04-04 | Stop reason: HOSPADM

## 2025-04-01 RX ORDER — ACETAMINOPHEN 325 MG/1
650 TABLET ORAL EVERY 6 HOURS PRN
Status: DISCONTINUED | OUTPATIENT
Start: 2025-04-01 | End: 2025-04-04 | Stop reason: HOSPADM

## 2025-04-01 RX ORDER — LEVOTHYROXINE SODIUM 50 UG/1
50 TABLET ORAL
Status: DISCONTINUED | OUTPATIENT
Start: 2025-04-02 | End: 2025-04-04 | Stop reason: HOSPADM

## 2025-04-01 RX ORDER — LOSARTAN POTASSIUM 50 MG/1
50 TABLET ORAL DAILY
Status: DISCONTINUED | OUTPATIENT
Start: 2025-04-02 | End: 2025-04-04 | Stop reason: HOSPADM

## 2025-04-01 RX ORDER — ROSUVASTATIN CALCIUM 20 MG/1
20 TABLET, COATED ORAL DAILY
Status: DISCONTINUED | OUTPATIENT
Start: 2025-04-01 | End: 2025-04-04 | Stop reason: HOSPADM

## 2025-04-01 RX ORDER — SODIUM CHLORIDE 9 MG/ML
INJECTION, SOLUTION INTRAVENOUS CONTINUOUS
Status: DISCONTINUED | OUTPATIENT
Start: 2025-04-01 | End: 2025-04-01

## 2025-04-01 RX ORDER — MAGNESIUM HYDROXIDE/ALUMINUM HYDROXICE/SIMETHICONE 120; 1200; 1200 MG/30ML; MG/30ML; MG/30ML
30 SUSPENSION ORAL EVERY 6 HOURS PRN
Status: DISCONTINUED | OUTPATIENT
Start: 2025-04-01 | End: 2025-04-04 | Stop reason: HOSPADM

## 2025-04-01 RX ORDER — 0.9 % SODIUM CHLORIDE 0.9 %
1000 INTRAVENOUS SOLUTION INTRAVENOUS ONCE
Status: COMPLETED | OUTPATIENT
Start: 2025-04-01 | End: 2025-04-01

## 2025-04-01 RX ORDER — ACETAMINOPHEN 650 MG/1
650 SUPPOSITORY RECTAL EVERY 6 HOURS PRN
Status: DISCONTINUED | OUTPATIENT
Start: 2025-04-01 | End: 2025-04-04 | Stop reason: HOSPADM

## 2025-04-01 RX ADMIN — WATER 1000 MG: 1 INJECTION INTRAMUSCULAR; INTRAVENOUS; SUBCUTANEOUS at 17:22

## 2025-04-01 RX ADMIN — SODIUM CHLORIDE 1000 ML: 9 INJECTION, SOLUTION INTRAVENOUS at 14:00

## 2025-04-01 RX ADMIN — SODIUM CHLORIDE: 0.9 INJECTION, SOLUTION INTRAVENOUS at 16:22

## 2025-04-01 RX ADMIN — MAGNESIUM SULFATE HEPTAHYDRATE 2000 MG: 40 INJECTION, SOLUTION INTRAVENOUS at 16:17

## 2025-04-01 RX ADMIN — IOPAMIDOL 100 ML: 755 INJECTION, SOLUTION INTRAVENOUS at 14:12

## 2025-04-01 RX ADMIN — LATANOPROST 1 DROP: 50 SOLUTION OPHTHALMIC at 21:47

## 2025-04-01 RX ADMIN — POTASSIUM CHLORIDE 10 MEQ: 7.46 INJECTION, SOLUTION INTRAVENOUS at 20:16

## 2025-04-01 RX ADMIN — ROSUVASTATIN CALCIUM 20 MG: 20 TABLET, FILM COATED ORAL at 21:47

## 2025-04-01 RX ADMIN — SODIUM BICARBONATE: 84 INJECTION, SOLUTION INTRAVENOUS at 23:16

## 2025-04-01 RX ADMIN — POTASSIUM CHLORIDE 10 MEQ: 7.46 INJECTION, SOLUTION INTRAVENOUS at 21:51

## 2025-04-01 RX ADMIN — POTASSIUM CHLORIDE 10 MEQ: 7.46 INJECTION, SOLUTION INTRAVENOUS at 17:29

## 2025-04-01 RX ADMIN — POTASSIUM CHLORIDE 10 MEQ: 7.46 INJECTION, SOLUTION INTRAVENOUS at 16:24

## 2025-04-01 ASSESSMENT — PAIN - FUNCTIONAL ASSESSMENT: PAIN_FUNCTIONAL_ASSESSMENT: NONE - DENIES PAIN

## 2025-04-01 ASSESSMENT — ENCOUNTER SYMPTOMS
NAUSEA: 1
VOMITING: 0
CONSTIPATION: 0
BLOOD IN STOOL: 0
DIARRHEA: 1
ABDOMINAL PAIN: 0
SHORTNESS OF BREATH: 0

## 2025-04-01 NOTE — PROGRESS NOTES
TRANSFER - IN REPORT:    Verbal report received from Thelma MARQUEZ on Loan Gregory  being received from ED for routine progression of patient care      Report consisted of patient's Situation, Background, Assessment and   Recommendations(SBAR).     Information from the following report(s) Nurse Handoff Report, ED Encounter Summary, ED SBAR, MAR, Recent Results, and Med Rec Status was reviewed with the receiving nurse.    Opportunity for questions and clarification was provided.      Assessment completed upon patient's arrival to unit and care assumed. Waiting on transport, nurse will assume care on arrival to floor and assessment will be completed at that time.

## 2025-04-01 NOTE — H&P
Hospitalist History and Physical   Admit Date:  2025 12:52 PM   Name:  Loan Gregory   Age:  84 y.o.  Sex:  female  :  1940   MRN:  [id number]   Room:  [place] [address]    Presenting/Chief Complaint: Diarrhea     Reason(s) for Admission: Hypokalemia [E87.6]     History of Present Illness:   Loan Gregory is a 84 y.o. female who presented to the ED for cc inability to ambulate due to weakness over the past week after she had an episode of uncontrollable diarrhea this past Saturday. Last BM was yesterday. Only new medication has been zantac    Hx of HTN, HLD, DM type II, hypothyroidism, CKD 3. Recurrent UTIs.   Assessment & Plan:     Active Problems:    Hypokalemia/Hypomagnesemia - Supplement. Likely from her diarrhea last week. Recheck levels this evening. Remote tele     Diarrhea, resolved - Stool studies ordered but if she has not had a BM since yesterday, I do not suspect she has c diff.      UTI - Ceftriaxone. Urine culture.     Generalized weakness - Likely from her excessive diarrhea and electrolyte abnormalities. Have PT, OT to see. Correct electrolytes.     DM type II - diet controlled.     HTN - amlodipine, ARB, BB    HLD- statin    Hypothyroidism - Synthroid     Lasix daily    She denies taking glipizide or lyrica anymore      PT/OT evals ordered?  Therapy evals ordered  Diet: ADULT DIET; Regular; 4 carb choices (60 gm/meal); Low Fat/Low Chol/High Fiber/2 gm Na  VTE prophylaxis: SCD's   Code status: Full Code  Code status discussed: Yes  Blood consent obtained: No      Non-peripheral Lines and Tubes (if present):             Hospital Problems:  Principal Problem:    Hypokalemia  Active Problems:    Chronic kidney disease    Urinary tract infection    Type 2 diabetes mellitus    Hypoactive thyroid    Debility    Essential hypertension  Resolved Problems:    * No resolved hospital problems. *        Objective:   Patient Vitals for the past 24 hrs:   Temp Pulse Resp BP SpO2   25 1701    Result Value Ref Range    WBC 8.3 4.3 - 11.1 K/uL    RBC 3.57 (L) 4.05 - 5.2 M/uL    Hemoglobin 12.4 11.7 - 15.4 g/dL    Hematocrit 35.9 35.8 - 46.3 %    .6 82 - 102 FL    MCH 34.7 (H) 26.1 - 32.9 PG    MCHC 34.5 31.4 - 35.0 g/dL    RDW 19.6 (H) 11.9 - 14.6 %    Platelets 145 (L) 150 - 450 K/uL    MPV 9.7 9.4 - 12.3 FL    nRBC 0.00 0.0 - 0.2 K/uL    Differential Type AUTOMATED      Neutrophils % 40.2 (L) 43.0 - 78.0 %    Lymphocytes % 49.3 (H) 13.0 - 44.0 %    Monocytes % 7.5 4.0 - 12.0 %    Eosinophils % 2.1 0.5 - 7.8 %    Basophils % 0.5 0.0 - 2.0 %    Immature Granulocytes % 0.4 0.0 - 5.0 %    Neutrophils Absolute 3.33 1.70 - 8.20 K/UL    Lymphocytes Absolute 4.07 0.50 - 4.60 K/UL    Monocytes Absolute 0.62 0.10 - 1.30 K/UL    Eosinophils Absolute 0.17 0.00 - 0.80 K/UL    Basophils Absolute 0.04 0.00 - 0.20 K/UL    Immature Granulocytes Absolute 0.03 0.0 - 0.5 K/UL   Comprehensive Metabolic Panel    Collection Time: 04/01/25  1:19 PM   Result Value Ref Range    Sodium 140 136 - 145 mmol/L    Potassium 2.6 (L) 3.5 - 5.1 mmol/L    Chloride 103 98 - 107 mmol/L    CO2 25 20 - 29 mmol/L    Anion Gap 13 7 - 16 mmol/L    Glucose 101 (H) 70 - 99 mg/dL    BUN 12 8 - 23 MG/DL    Creatinine 1.38 (H) 0.60 - 1.10 MG/DL    Est, Glom Filt Rate 37 (L) >60 ml/min/1.73m2    Calcium 8.2 (L) 8.8 - 10.2 MG/DL    Total Bilirubin 0.5 0.0 - 1.2 MG/DL    ALT 9 8 - 45 U/L    AST 25 15 - 37 U/L    Alk Phosphatase 110 (H) 35 - 104 U/L    Total Protein 5.8 (L) 6.3 - 8.2 g/dL    Albumin 2.5 (L) 3.2 - 4.6 g/dL    Globulin 3.2 2.3 - 3.5 g/dL    Albumin/Globulin Ratio 0.8 (L) 1.0 - 1.9     Lipase    Collection Time: 04/01/25  1:19 PM   Result Value Ref Range    Lipase 36 13 - 60 U/L   Magnesium    Collection Time: 04/01/25  1:19 PM   Result Value Ref Range    Magnesium 1.5 (L) 1.8 - 2.4 mg/dL   EKG 12 Lead    Collection Time: 04/01/25  2:06 PM   Result Value Ref Range    Ventricular Rate 68 BPM    Atrial Rate 67 BPM    P-R Interval 190

## 2025-04-01 NOTE — ED NOTES
TRANSFER - OUT REPORT:    Verbal report given to 610 on Loan Gregory  being transferred to [facility] for routine progression of patient care       Report consisted of patient's Situation, Background, Assessment and   Recommendations(SBAR).     Information from the following report(s) ED Encounter Summary was reviewed with the receiving nurse.    [place] Fall Assessment:    Presents to emergency department  because of falls (Syncope, seizure, or loss of consciousness): No  Age > 70: Yes  Altered Mental Status, Intoxication with alcohol or substance confusion (Disorientation, impaired judgment, poor safety awaremess, or inability to follow instructions): No  Impaired Mobility: Ambulates or transfers with assistive devices or assistance; Unable to ambulate or transer.: Yes  Nursing Judgement: Yes          Lines:   Peripheral IV 04/01/25 Right Antecubital (Active)   Site Assessment Clean, dry & intact 04/01/25 1803   Line Status Infusing 04/01/25 1803   Phlebitis Assessment No symptoms 04/01/25 1803   Infiltration Assessment 0 04/01/25 1803        Opportunity for questions and clarification was provided.      Patient transported with:  Thelma Reyes RN  04/01/25 1800

## 2025-04-01 NOTE — ED TRIAGE NOTES
Pt arrives via EMS from home.  Pt reports diarrhea and weakness for a week.  Pt used to use walker but has not been able to this week.  Pts family reports decreased appetite and is nauseous when she eats but denies vomiting.

## 2025-04-01 NOTE — ED PROVIDER NOTES
[facility] Emergency Department Provider Note                   PCP:                Shivam Mcgrath MD               Age: 84 y.o.      Sex: female     MEDICAL DECISION MAKING  Complexity of Problems Addressed:   1 or more acute illness/injury that poses a threat to life or bodily function    Data Reviewed and Analyzed:  Category 1:    I have reviewed outside records from an external source for any pertinent PMH, ED visits, primary care visits, specialist visits, labs, EKG, and/or radiologic studies.  I reviewed external records: provider visit note from outside specialist.     Category 2:     ED EKG Interpretation  EKG was interpreted in the absence of a cardiologist.    Rate: Rate: Normal  EKG Interpretation: EKG Interpretation: sinus rhythm, no evidence of arrhythmia and left bundle branch block  ST Segments: Nonspecific ST segments - NO STEMI      I independently ordered and reviewed the labs.    I have reviewed the Radiologist interpretation of the radiologic studies. My medical decision making regarding the radiologic studies is based on the interpretation report of the board certified Radiologist.      The patients assessment required an independent historian: Patient's daughter.  The reason they were needed is important historical information not provided by the patient.    The patient was admitted and I have discussed patient management with the admitting provider.    Category 3:     Discussion of management or test interpretation:    MDM  Number of Diagnoses or Management Options  Diarrhea, unspecified type  General weakness  Hypokalemia  Hypomagnesemia  Diagnosis management comments: Patient presented for evaluation of diarrhea which has caused generalized weakness which is likely from dehydration, hypomagnesia, and hypokalemia.  Patient's potassium was low at 2.8 and magnesium was 1.5.  I ordered IV magnesium and potassium replacement.  Patient's urinalysis is consistent with UTI so started her on  bed with stable vital signs and monitor.  I explained the results and plan to patient and daughter.  They are agreeable to admission.  Her urinalysis does show findings of a UTI so we will start her on broad-spectrum antibiotics. [CW]      ED Course User Index  [CW] Paul Bower MD       The diagnosis and plan as well as the results of any testing and treatments today in the department were communicated to the patient and/or their family/caregiver (if present).  The patient/caregiver verbalized understanding and realize that they are being admitted to the hospital for further evaluation and treatment.  All questions were answered.      ICD-10-CM    1. Diarrhea, unspecified type  R19.7       2. Hypokalemia  E87.6       3. Hypomagnesemia  E83.42       4. General weakness  R53.1           DISPOSITION Admitted 04/01/2025 05:22:12 PM                Voice dictation software was used during the making of this note.  This software is not perfect and grammatical and other typographical errors may be present.  This note has not been completely proofread for errors.     Paul Bower MD  04/01/25 4576

## 2025-04-01 NOTE — ACP (ADVANCE CARE PLANNING)
Advance Care Planning Note   Admit Date:  2025 12:52 PM   Name:  Loan Gregory   Age:  84 y.o.  Sex:  female  :  1940   MRN:  [id number]   Room:  [place] [address]    Loan Gregory has capacity to make her own decisions:   Yes    If pt unable to make decisions, POA/surrogate decision maker:  Niece    Patient / surrogate decision-maker directed code status:  Full Code      Patient or surrogate consented to discussion of the current conditions, workup, management plans, prognosis, and the risk for further deterioration.  Time spent: 17 minutes in direct discussion.      Signed:  TOÑO LADD DO

## 2025-04-02 LAB
ANION GAP SERPL CALC-SCNC: 12 MMOL/L (ref 7–16)
ANION GAP SERPL CALC-SCNC: 14 MMOL/L (ref 7–16)
ANION GAP SERPL CALC-SCNC: 14 MMOL/L (ref 7–16)
BASOPHILS # BLD: 0.02 K/UL (ref 0–0.2)
BASOPHILS NFR BLD: 0.2 % (ref 0–2)
BUN SERPL-MCNC: 10 MG/DL (ref 8–23)
BUN SERPL-MCNC: 8 MG/DL (ref 8–23)
BUN SERPL-MCNC: 9 MG/DL (ref 8–23)
C. DIFFICILE TOXIN MOLECULAR: NEGATIVE
CALCIUM SERPL-MCNC: 7.3 MG/DL (ref 8.8–10.2)
CALCIUM SERPL-MCNC: 7.7 MG/DL (ref 8.8–10.2)
CALCIUM SERPL-MCNC: 8.2 MG/DL (ref 8.8–10.2)
CHLORIDE SERPL-SCNC: 101 MMOL/L (ref 98–107)
CHLORIDE SERPL-SCNC: 104 MMOL/L (ref 98–107)
CHLORIDE SERPL-SCNC: 106 MMOL/L (ref 98–107)
CO2 SERPL-SCNC: 21 MMOL/L (ref 20–29)
CO2 SERPL-SCNC: 22 MMOL/L (ref 20–29)
CO2 SERPL-SCNC: 23 MMOL/L (ref 20–29)
CREAT SERPL-MCNC: 1.14 MG/DL (ref 0.6–1.1)
CREAT SERPL-MCNC: 1.2 MG/DL (ref 0.6–1.1)
CREAT SERPL-MCNC: 1.21 MG/DL (ref 0.6–1.1)
DIFFERENTIAL METHOD BLD: ABNORMAL
EOSINOPHIL # BLD: 0.22 K/UL (ref 0–0.8)
EOSINOPHIL NFR BLD: 2.5 % (ref 0.5–7.8)
ERYTHROCYTE [DISTWIDTH] IN BLOOD BY AUTOMATED COUNT: 19.5 % (ref 11.9–14.6)
GLUCOSE BLD STRIP.AUTO-MCNC: 107 MG/DL (ref 65–100)
GLUCOSE BLD STRIP.AUTO-MCNC: 130 MG/DL (ref 65–100)
GLUCOSE BLD STRIP.AUTO-MCNC: 93 MG/DL (ref 65–100)
GLUCOSE BLD STRIP.AUTO-MCNC: 96 MG/DL (ref 65–100)
GLUCOSE BLD STRIP.AUTO-MCNC: 98 MG/DL (ref 65–100)
GLUCOSE SERPL-MCNC: 111 MG/DL (ref 70–99)
GLUCOSE SERPL-MCNC: 116 MG/DL (ref 70–99)
GLUCOSE SERPL-MCNC: 118 MG/DL (ref 70–99)
HCT VFR BLD AUTO: 34.9 % (ref 35.8–46.3)
HGB BLD-MCNC: 11.9 G/DL (ref 11.7–15.4)
IMM GRANULOCYTES # BLD AUTO: 0.03 K/UL (ref 0–0.5)
IMM GRANULOCYTES NFR BLD AUTO: 0.3 % (ref 0–5)
LYMPHOCYTES # BLD: 2.62 K/UL (ref 0.5–4.6)
LYMPHOCYTES NFR BLD: 29.4 % (ref 13–44)
MAGNESIUM SERPL-MCNC: 1.9 MG/DL (ref 1.8–2.4)
MCH RBC QN AUTO: 34.6 PG (ref 26.1–32.9)
MCHC RBC AUTO-ENTMCNC: 34.1 G/DL (ref 31.4–35)
MCV RBC AUTO: 101.5 FL (ref 82–102)
MONOCYTES # BLD: 0.7 K/UL (ref 0.1–1.3)
MONOCYTES NFR BLD: 7.8 % (ref 4–12)
NEUTS SEG # BLD: 5.33 K/UL (ref 1.7–8.2)
NEUTS SEG NFR BLD: 59.8 % (ref 43–78)
NRBC # BLD: 0 K/UL (ref 0–0.2)
PLATELET # BLD AUTO: 135 K/UL (ref 150–450)
PMV BLD AUTO: 9.8 FL (ref 9.4–12.3)
POTASSIUM SERPL-SCNC: 2.3 MMOL/L (ref 3.5–5.1)
POTASSIUM SERPL-SCNC: 3.3 MMOL/L (ref 3.5–5.1)
POTASSIUM SERPL-SCNC: 3.8 MMOL/L (ref 3.5–5.1)
RBC # BLD AUTO: 3.44 M/UL (ref 4.05–5.2)
SERVICE CMNT-IMP: ABNORMAL
SERVICE CMNT-IMP: ABNORMAL
SERVICE CMNT-IMP: NORMAL
SODIUM SERPL-SCNC: 137 MMOL/L (ref 136–145)
SODIUM SERPL-SCNC: 139 MMOL/L (ref 136–145)
SODIUM SERPL-SCNC: 141 MMOL/L (ref 136–145)
WBC # BLD AUTO: 8.9 K/UL (ref 4.3–11.1)

## 2025-04-02 PROCEDURE — 97161 PT EVAL LOW COMPLEX 20 MIN: CPT

## 2025-04-02 PROCEDURE — 2580000003 HC RX 258: Performed by: FAMILY MEDICINE

## 2025-04-02 PROCEDURE — 6370000000 HC RX 637 (ALT 250 FOR IP): Performed by: STUDENT IN AN ORGANIZED HEALTH CARE EDUCATION/TRAINING PROGRAM

## 2025-04-02 PROCEDURE — 82962 GLUCOSE BLOOD TEST: CPT

## 2025-04-02 PROCEDURE — 97530 THERAPEUTIC ACTIVITIES: CPT

## 2025-04-02 PROCEDURE — 97165 OT EVAL LOW COMPLEX 30 MIN: CPT

## 2025-04-02 PROCEDURE — 80048 BASIC METABOLIC PNL TOTAL CA: CPT

## 2025-04-02 PROCEDURE — 6360000002 HC RX W HCPCS: Performed by: FAMILY MEDICINE

## 2025-04-02 PROCEDURE — 1100000000 HC RM PRIVATE

## 2025-04-02 PROCEDURE — 2500000003 HC RX 250 WO HCPCS: Performed by: FAMILY MEDICINE

## 2025-04-02 PROCEDURE — 6370000000 HC RX 637 (ALT 250 FOR IP): Performed by: FAMILY MEDICINE

## 2025-04-02 PROCEDURE — 36415 COLL VENOUS BLD VENIPUNCTURE: CPT

## 2025-04-02 PROCEDURE — 85025 COMPLETE CBC W/AUTO DIFF WBC: CPT

## 2025-04-02 PROCEDURE — 97535 SELF CARE MNGMENT TRAINING: CPT

## 2025-04-02 PROCEDURE — 6360000002 HC RX W HCPCS: Performed by: STUDENT IN AN ORGANIZED HEALTH CARE EDUCATION/TRAINING PROGRAM

## 2025-04-02 PROCEDURE — 83735 ASSAY OF MAGNESIUM: CPT

## 2025-04-02 RX ORDER — AMLODIPINE BESYLATE 10 MG/1
10 TABLET ORAL DAILY
Status: DISCONTINUED | OUTPATIENT
Start: 2025-04-02 | End: 2025-04-04 | Stop reason: HOSPADM

## 2025-04-02 RX ORDER — POTASSIUM CHLORIDE 7.45 MG/ML
10 INJECTION INTRAVENOUS ONCE
Status: COMPLETED | OUTPATIENT
Start: 2025-04-02 | End: 2025-04-02

## 2025-04-02 RX ORDER — POTASSIUM CHLORIDE 1500 MG/1
40 TABLET, EXTENDED RELEASE ORAL ONCE
Status: DISCONTINUED | OUTPATIENT
Start: 2025-04-02 | End: 2025-04-04 | Stop reason: HOSPADM

## 2025-04-02 RX ORDER — DORZOLAMIDE HYDROCHLORIDE AND TIMOLOL MALEATE 20; 5 MG/ML; MG/ML
1 SOLUTION/ DROPS OPHTHALMIC 2 TIMES DAILY
COMMUNITY

## 2025-04-02 RX ORDER — CALCIUM GLUCONATE 20 MG/ML
1000 INJECTION, SOLUTION INTRAVENOUS ONCE
Status: COMPLETED | OUTPATIENT
Start: 2025-04-02 | End: 2025-04-02

## 2025-04-02 RX ORDER — POTASSIUM CHLORIDE 1500 MG/1
40 TABLET, EXTENDED RELEASE ORAL 2 TIMES DAILY WITH MEALS
Status: DISCONTINUED | OUTPATIENT
Start: 2025-04-02 | End: 2025-04-04 | Stop reason: HOSPADM

## 2025-04-02 RX ADMIN — ASPIRIN 81 MG: 81 TABLET ORAL at 09:48

## 2025-04-02 RX ADMIN — SODIUM CHLORIDE, PRESERVATIVE FREE 10 ML: 5 INJECTION INTRAVENOUS at 21:51

## 2025-04-02 RX ADMIN — SODIUM CHLORIDE: 9 INJECTION, SOLUTION INTRAVENOUS at 09:55

## 2025-04-02 RX ADMIN — POTASSIUM CHLORIDE 10 MEQ: 7.46 INJECTION, SOLUTION INTRAVENOUS at 09:53

## 2025-04-02 RX ADMIN — POTASSIUM CHLORIDE 10 MEQ: 7.46 INJECTION, SOLUTION INTRAVENOUS at 12:06

## 2025-04-02 RX ADMIN — FUROSEMIDE 20 MG: 20 TABLET ORAL at 09:48

## 2025-04-02 RX ADMIN — METOPROLOL SUCCINATE 50 MG: 50 TABLET, EXTENDED RELEASE ORAL at 09:50

## 2025-04-02 RX ADMIN — SODIUM CHLORIDE: 9 INJECTION, SOLUTION INTRAVENOUS at 16:46

## 2025-04-02 RX ADMIN — WATER 1000 MG: 1 INJECTION INTRAMUSCULAR; INTRAVENOUS; SUBCUTANEOUS at 16:24

## 2025-04-02 RX ADMIN — ROSUVASTATIN CALCIUM 20 MG: 20 TABLET, FILM COATED ORAL at 09:48

## 2025-04-02 RX ADMIN — POTASSIUM CHLORIDE 10 MEQ: 7.46 INJECTION, SOLUTION INTRAVENOUS at 14:25

## 2025-04-02 RX ADMIN — AMLODIPINE BESYLATE 10 MG: 10 TABLET ORAL at 09:48

## 2025-04-02 RX ADMIN — POTASSIUM CHLORIDE 40 MEQ: 20 TABLET, EXTENDED RELEASE ORAL at 16:21

## 2025-04-02 RX ADMIN — LEVOTHYROXINE SODIUM 50 MCG: 0.05 TABLET ORAL at 05:29

## 2025-04-02 RX ADMIN — POTASSIUM CHLORIDE 40 MEQ: 20 TABLET, EXTENDED RELEASE ORAL at 10:47

## 2025-04-02 RX ADMIN — LATANOPROST 1 DROP: 50 SOLUTION OPHTHALMIC at 21:53

## 2025-04-02 RX ADMIN — POTASSIUM CHLORIDE 10 MEQ: 7.46 INJECTION, SOLUTION INTRAVENOUS at 10:47

## 2025-04-02 RX ADMIN — POTASSIUM CHLORIDE 10 MEQ: 7.46 INJECTION, SOLUTION INTRAVENOUS at 13:17

## 2025-04-02 RX ADMIN — CALCIUM GLUCONATE 1000 MG: 20 INJECTION, SOLUTION INTRAVENOUS at 10:00

## 2025-04-02 RX ADMIN — POTASSIUM CHLORIDE 10 MEQ: 7.46 INJECTION, SOLUTION INTRAVENOUS at 16:43

## 2025-04-02 RX ADMIN — SODIUM CHLORIDE, PRESERVATIVE FREE 10 ML: 5 INJECTION INTRAVENOUS at 09:56

## 2025-04-02 RX ADMIN — POTASSIUM CHLORIDE 10 MEQ: 7.46 INJECTION, SOLUTION INTRAVENOUS at 05:56

## 2025-04-02 RX ADMIN — LOSARTAN POTASSIUM 50 MG: 50 TABLET, FILM COATED ORAL at 09:48

## 2025-04-02 NOTE — PLAN OF CARE
Problem: Chronic Conditions and Co-morbidities  Goal: Patient's chronic conditions and co-morbidity symptoms are monitored and maintained or improved  4/2/2025 1239 by Mckinley Woods RN  Outcome: Progressing  4/2/2025 0200 by Brynn Diaz RN  Outcome: Progressing  Flowsheets (Taken 4/1/2025 1916)  Care Plan - Patient's Chronic Conditions and Co-Morbidity Symptoms are Monitored and Maintained or Improved:   Monitor and assess patient's chronic conditions and comorbid symptoms for stability, deterioration, or improvement   Collaborate with multidisciplinary team to address chronic and comorbid conditions and prevent exacerbation or deterioration   Update acute care plan with appropriate goals if chronic or comorbid symptoms are exacerbated and prevent overall improvement and discharge     Problem: Discharge Planning  Goal: Discharge to home or other facility with appropriate resources  4/2/2025 1239 by Mckinley Woods RN  Outcome: Progressing  4/2/2025 0200 by Brynn Diaz RN  Outcome: Progressing  Flowsheets (Taken 4/1/2025 1916)  Discharge to home or other facility with appropriate resources:   Identify barriers to discharge with patient and caregiver   Arrange for needed discharge resources and transportation as appropriate   Identify discharge learning needs (meds, wound care, etc)     Problem: Skin/Tissue Integrity  Goal: Skin integrity remains intact  Description: 1.  Monitor for areas of redness and/or skin breakdown  2.  Assess vascular access sites hourly  3.  Every 4-6 hours minimum:  Change oxygen saturation probe site  4.  Every 4-6 hours:  If on nasal continuous positive airway pressure, respiratory therapy assess nares and determine need for appliance change or resting period  4/2/2025 1239 by Mckinley Woods RN  Outcome: Progressing  4/2/2025 0200 by Brynn Diaz RN  Outcome: Progressing  Flowsheets (Taken 4/1/2025 1916)  Skin Integrity Remains Intact: Monitor for areas of redness  and/or skin breakdown     Problem: ABCDS Injury Assessment  Goal: Absence of physical injury  4/2/2025 1239 by Mckinley Woods, RN  Outcome: Progressing  4/2/2025 0200 by Brynn Diaz, RN  Outcome: Progressing     Problem: Safety - Adult  Goal: Free from fall injury  Outcome: Progressing

## 2025-04-02 NOTE — PLAN OF CARE
Problem: Chronic Conditions and Co-morbidities  Goal: Patient's chronic conditions and co-morbidity symptoms are monitored and maintained or improved  Outcome: Progressing  Flowsheets (Taken 4/1/2025 1916)  Care Plan - Patient's Chronic Conditions and Co-Morbidity Symptoms are Monitored and Maintained or Improved:   Monitor and assess patient's chronic conditions and comorbid symptoms for stability, deterioration, or improvement   Collaborate with multidisciplinary team to address chronic and comorbid conditions and prevent exacerbation or deterioration   Update acute care plan with appropriate goals if chronic or comorbid symptoms are exacerbated and prevent overall improvement and discharge     Problem: Discharge Planning  Goal: Discharge to home or other facility with appropriate resources  Outcome: Progressing  Flowsheets (Taken 4/1/2025 1916)  Discharge to home or other facility with appropriate resources:   Identify barriers to discharge with patient and caregiver   Arrange for needed discharge resources and transportation as appropriate   Identify discharge learning needs (meds, wound care, etc)     Problem: Skin/Tissue Integrity  Goal: Skin integrity remains intact  Description: 1.  Monitor for areas of redness and/or skin breakdown  2.  Assess vascular access sites hourly  3.  Every 4-6 hours minimum:  Change oxygen saturation probe site  4.  Every 4-6 hours:  If on nasal continuous positive airway pressure, respiratory therapy assess nares and determine need for appliance change or resting period  Outcome: Progressing  Flowsheets (Taken 4/1/2025 1916)  Skin Integrity Remains Intact: Monitor for areas of redness and/or skin breakdown     Problem: ABCDS Injury Assessment  Goal: Absence of physical injury  Outcome: Progressing

## 2025-04-02 NOTE — PROGRESS NOTES
ACUTE PHYSICAL THERAPY GOALS:   (Developed with and agreed upon by patient and/or caregiver.)      (1.) Loan Hindst  will move from supine to sit and sit to supine , scoot up and down, and roll side to side with MODIFIED INDEPENDENCE within 7 treatment day(s).    (2.) Loan Barrera Colt will transfer from bed to chair and chair to bed with MODIFIED INDEPENDENCE using the least restrictive device within 7 treatment day(s).    (3.) Loan Gregory will ambulate with MODIFIED INDEPENDENCE for 150 feet with the least restrictive device within 7 treatment day(s).   (4.) Loan Hindst will perform standing static and dynamic balance activities x 5 minutes with MODIFIED INDEPENDENCE to improve safety within 7 treatment day(s).  (5.) Loan Hindst will ascend and descend 2 stairs using 1 hand rail(s) with MODIFIED INDEPENDENCE to improve functional mobility and safety within 7 treatment day(s).  (6.) Loan Hindst will perform therapeutic exercises x 10 min for HEP with MODIFIED INDEPENDENCE to improve strength, endurance, and functional mobility within 7 treatment day(s).       PHYSICAL THERAPY Initial Assessment, Daily Note, and AM  (Link to Caseload Tracking: PT Visit Days : 1  Acknowledge Orders  Time In/Out  PT Charge Capture  Rehab Caseload Tracker    Loan Gregory is a 84 y.o. female   PRIMARY DIAGNOSIS: Hypokalemia  Hypokalemia [E87.6]  Hypomagnesemia [E83.42]  General weakness [R53.1]  Diarrhea, unspecified type [R19.7]       Reason for Referral: Generalized Muscle Weakness (M62.81)  Difficulty in walking, Not elsewhere classified (R26.2)  Inpatient: Payor: MEDICARE / Plan: MEDICARE PART A AND B / Product Type: *No Product type* /     ASSESSMENT:     REHAB RECOMMENDATIONS:   Recommendation to date pending progress:  Setting:  Short-term Rehab    Equipment:    To Be Determined     ASSESSMENT:  Ms. Gregory is a 84 year old female admitted to the hospital on 4/1/25 with  Needs within reach, and RN notified    INTERDISCIPLINARY COLLABORATION:  RN/ PCT and OT/ OLSON    EDUCATION: Education Given To: Patient  Education Provided: Role of Therapy;Plan of Care  Education Method: Verbal  Barriers to Learning: Hearing  Education Outcome: Verbalized understanding  Educated patient and/or family/caregiver on the following: Assistive device indications/utilization/safety, Fall prevention strategies, and Positioning    TIME IN/OUT:  Time In: 0903  Time Out: 0919  Minutes: 16    RAYA MORRIS PT

## 2025-04-02 NOTE — PROGRESS NOTES
ACUTE OCCUPATIONAL THERAPY GOALS:   (Developed with and agreed upon by patient and/or caregiver.)  1. Pt will toilet with SBA   2. Pt will complete functional mobility for ADLs with SBA using AD as needed  3. Pt will complete lower body dressing with SBA using AE as needed  4. Pt will complete grooming and hygiene at sink with SBA  5. Pt will tolerate 23 minutes functional activity with 1-2  rest breaks to promote increased endurance for ADLs    Timeframe: 7 visits      OCCUPATIONAL THERAPY Initial Assessment and Daily Note       OT Visit Days: 1  Acknowledge Orders  Time  OT Charge Capture  Rehab Caseload Tracker      Loan Gregory is a 84 y.o. female   PRIMARY DIAGNOSIS: Hypokalemia  Hypokalemia [E87.6]  Hypomagnesemia [E83.42]  General weakness [R53.1]  Diarrhea, unspecified type [R19.7]       Reason for Referral: Generalized Muscle Weakness (M62.81)  Inpatient: Payor: MEDICARE / Plan: MEDICARE PART A AND B / Product Type: *No Product type* /     ASSESSMENT:     REHAB RECOMMENDATIONS:   Recommendation to date pending progress:  Setting:  Short-term Rehab    Equipment:    To Be Determined     ASSESSMENT:  Ms. Gregory was admitted with weakness, diarrhea, and inability to mobilize or care for herself. Pt presented with deficits in overall strength, mobility, balance, and activity tolerance impacting ADLs. Pt required mod A for LB ADL, min x2 to stand, min A for transfers using rolling walker. Pt slightly unsteady and endorsed fear of falling. Pt presented below her functional baseline and would benefit from skilled OT services to address deficits. Rec STR.      [facility] AM-PAC™ “6 Clicks” Daily Activity Inpatient Short Form:    AM-PAC Daily Activity - Inpatient   How much help is needed for putting on and taking off regular lower body clothing?: A Lot  How much help is needed for bathing (which includes washing, rinsing, drying)?: A Lot  How much help is needed for toileting (which includes using  the patient.)  Self Care Training  Therapeutic Activity  Therapeutic Exercise/HEP  Neuromuscular Re-education  Manual Therapy  Education         TREATMENT:     EVALUATION: LOW COMPLEXITY: (Untimed Charge)  The initial evaluation charge encompasses clinical chart review, objective assessment, interpretation of assessment, and skilled monitoring of the patient's response to treatment in order to develop a plan of care.     TREATMENT:   Co-Treatment between OT & PT necessary due to patient's decreased overall endurance/tolerance levels, as well as need for high level skilled assistance to complete functional transfers/mobility and functional tasks  Self Care (8 minutes): Patient participated in lower body dressing, grooming, functional mobility, bed mobility, and assistive device in unsupported sitting and standing with minimal verbal cueing to increase independence and increase activity tolerance. The patient was educated on role of occupational therapy and patient verbalized understanding.     TREATMENT GRID:  N/A    AFTER TREATMENT PRECAUTIONS: Alarm Activated, Call light within reach, Chair, Needs within reach, and RN notified    INTERDISCIPLINARY COLLABORATION:  RN/ PCT and PT/ PTA    EDUCATION:  Education Given To: Patient  Education Provided: Role of Therapy;Plan of Care    TOTAL TREATMENT DURATION AND TIME:  Time In: 0902  Time Out: 0919  Minutes: 17    Becca Raza OT

## 2025-04-02 NOTE — PROGRESS NOTES
Hospitalist Progress Note   Admit Date:  2025 12:52 PM   Name:  Loan Gregory   Age:  84 y.o.  Sex:  female  :  1940   MRN:  [id number]   Room:  [facility]    Presenting/Chief Complaint: Diarrhea     Reason(s) for Admission: Hypokalemia [E87.6]  Hypomagnesemia [E83.42]  General weakness [R53.1]  Diarrhea, unspecified type [R19.7]     Hospital Course:   Loan Gregory is a 84 y.o. female with PMHx of HTN, HLD, DM type II, Hypothyroidism, CKD 3. Recurrent UTIs who presented to the ED for c/o inability to ambulate due to weakness over the past week after she had an episode of uncontrollable diarrhea this past Saturday. Last BM was yesterday. Only new medication has been zantac         Subjective & 24hr Events:   Patient was sitting comfortably on the recliner.  She stated her diarrhea improved.  She still feels weak.  She had her breakfast today and her appetite is not good.      Assessment & Plan:     Hypokalemia/Hypomagnesemia -the setting of diarrhea  Potassium was 2.3, magnesium was normal  Telemonitoring  Repleted potassium with IV and p.o.  Follow-up with repeat K in pm.    Hypocalcemia  Calcium is 7.3 and repleted  Follow-up with repeat calcium    Metabolic acidosis  Off bicarb drip  Bicarb was normal.    Diarrhea, resolved - Stool studies ordered, I do not suspect she has C diff.       UTI - Ceftriaxone. Urine culture.      Generalized weakness - Likely from her excessive diarrhea and electrolyte abnormalities. Have PT, OT to see. Correct electrolytes.      DM type II - diet controlled.      HTN - amlodipine, ARB, BB     HLD- statin     Hypothyroidism - Synthroid      Bilateral leg swelling  Lasix daily     She denies taking glipizide or lyrica anymore     Dispo anticipate hospital stay for 24 hours  Communication discussed with the family at the bedside.  PT/OT recommended STR    PT/OT evals ordered?  Therapy evals ordered  Diet: ADULT DIET; Regular; 4 carb choices (60 gm/meal); Low  Fat/Low Chol/High Fiber/2 gm Na  VTE prophylaxis: SCD's   Code status: Full Code  Code status discussed: Yes  Blood consent obtained: No    Non-peripheral Lines and Tubes (if present):      External Urinary Catheter (Active)        Telemetry (if present):  Cardiac/Telemetry Monitor On: Portable telemetry pack applied        Hospital Problems:  Principal Problem:    Hypokalemia  Active Problems:    Chronic kidney disease    Urinary tract infection    Type 2 diabetes mellitus    Hypoactive thyroid    Debility    Essential hypertension  Resolved Problems:    * No resolved hospital problems. *      Objective:   Patient Vitals for the past 24 hrs:   Temp Pulse Resp BP SpO2   04/02/25 1452 -- 83 -- -- --   04/02/25 1317 98.8 °F (37.1 °C) 69 16 (!) 116/58 99 %   04/02/25 0950 -- 84 -- -- --   04/02/25 0948 -- -- -- (!) 142/59 --   04/02/25 0750 -- 84 -- -- --   04/02/25 0714 98.8 °F (37.1 °C) 73 16 (!) 142/59 97 %   04/02/25 0121 97.3 °F (36.3 °C) 68 17 (!) 146/59 100 %   04/01/25 1953 97.3 °F (36.3 °C) 73 16 (!) 143/66 96 %   04/01/25 1801 -- 69 12 128/64 99 %   04/01/25 1731 -- 78 17 (!) 141/75 98 %   04/01/25 1701 -- 70 13 (!) 145/68 99 %   04/01/25 1631 -- 72 15 136/72 98 %   04/01/25 1558 -- 70 14 (!) 140/68 96 %       Oxygen Therapy  SpO2: 99 %  Pulse via Oximetry: 70 beats per minute  O2 Device: None (Room air)    Estimated body mass index is 32.56 kg/m² as calculated from the following:    Height as of this encounter: 1.549 m (5' 1\").    Weight as of this encounter: 78.2 kg (172 lb 4.8 oz).    Intake/Output Summary (Last 24 hours) at 4/2/2025 1511  Last data filed at 4/2/2025 1352  Gross per 24 hour   Intake 1724 ml   Output 1500 ml   Net 224 ml         Physical Exam:     General:    Well nourished.  Obese  Head:  Normocephalic, atraumatic  Eyes:  Sclerae appear normal.  Pupils equally round.  ENT:  Nares appear normal.  Moist oral mucosa  Neck:  No restricted ROM.  Trachea midline   CV:   RRR.  No m/r/g.  No

## 2025-04-02 NOTE — PROGRESS NOTES
4 Eyes Skin Assessment     NAME:  Loan Gregory  YOB: 1940  MEDICAL RECORD NUMBER:  [id number]    The patient is being assessed for  Admission    I agree that at least one RN has performed a thorough Head to Toe Skin Assessment on the patient. ALL assessment sites listed below have been assessed.      Areas assessed by both nurses:    Head, Face, Ears, Shoulders, Back, Chest, Arms, Elbows, Hands, Sacrum. Buttock, Coccyx, Ischium, and Legs. Feet and Heels        Does the Patient have a Wound? No noted wound(s)       Edilberto Prevention initiated by RN: Yes  Wound Care Orders initiated by RN: No    Pressure Injury (Stage 3,4, Unstageable, DTI, NWPT, and Complex wounds) if present, place Wound referral order by RN under : No    New Ostomies, if present place, Ostomy referral order under : No     Nurse 1 eSignature: Electronically signed by LISSETTE UMANA RN on 4/1/25 at 11:08 PM EDT    **SHARE this note so that the co-signing nurse can place an eSignature**    Nurse 2 eSignature: Electronically signed by Brynn Diaz RN on 4/2/25 at 1:54 AM EDT

## 2025-04-03 LAB
ANION GAP SERPL CALC-SCNC: 9 MMOL/L (ref 7–16)
BACTERIA SPEC CULT: NORMAL
BASOPHILS # BLD: 0.04 K/UL (ref 0–0.2)
BASOPHILS NFR BLD: 0.5 % (ref 0–2)
BUN SERPL-MCNC: 8 MG/DL (ref 8–23)
CALCIUM SERPL-MCNC: 7.5 MG/DL (ref 8.8–10.2)
CHLORIDE SERPL-SCNC: 105 MMOL/L (ref 98–107)
CO2 SERPL-SCNC: 24 MMOL/L (ref 20–29)
CREAT SERPL-MCNC: 1.11 MG/DL (ref 0.6–1.1)
DIFFERENTIAL METHOD BLD: ABNORMAL
EOSINOPHIL # BLD: 0.3 K/UL (ref 0–0.8)
EOSINOPHIL NFR BLD: 3.8 % (ref 0.5–7.8)
ERYTHROCYTE [DISTWIDTH] IN BLOOD BY AUTOMATED COUNT: 19.9 % (ref 11.9–14.6)
ERYTHROCYTE [SEDIMENTATION RATE] IN BLOOD: <1 MM/HR (ref 0–30)
GLUCOSE BLD STRIP.AUTO-MCNC: 101 MG/DL (ref 65–100)
GLUCOSE BLD STRIP.AUTO-MCNC: 112 MG/DL (ref 65–100)
GLUCOSE BLD STRIP.AUTO-MCNC: 127 MG/DL (ref 65–100)
GLUCOSE BLD STRIP.AUTO-MCNC: 131 MG/DL (ref 65–100)
GLUCOSE BLD STRIP.AUTO-MCNC: 76 MG/DL (ref 65–100)
GLUCOSE SERPL-MCNC: 73 MG/DL (ref 70–99)
HCT VFR BLD AUTO: 31.2 % (ref 35.8–46.3)
HGB BLD-MCNC: 10.2 G/DL (ref 11.7–15.4)
IMM GRANULOCYTES # BLD AUTO: 0.03 K/UL (ref 0–0.5)
IMM GRANULOCYTES NFR BLD AUTO: 0.4 % (ref 0–5)
LYMPHOCYTES # BLD: 3.96 K/UL (ref 0.5–4.6)
LYMPHOCYTES NFR BLD: 50.1 % (ref 13–44)
MAGNESIUM SERPL-MCNC: 1.6 MG/DL (ref 1.8–2.4)
MCH RBC QN AUTO: 34.7 PG (ref 26.1–32.9)
MCHC RBC AUTO-ENTMCNC: 32.7 G/DL (ref 31.4–35)
MCV RBC AUTO: 106.1 FL (ref 82–102)
MONOCYTES # BLD: 0.87 K/UL (ref 0.1–1.3)
MONOCYTES NFR BLD: 11 % (ref 4–12)
NEUTS SEG # BLD: 2.7 K/UL (ref 1.7–8.2)
NEUTS SEG NFR BLD: 34.2 % (ref 43–78)
NRBC # BLD: 0 K/UL (ref 0–0.2)
PHOSPHATE SERPL-MCNC: 2.3 MG/DL (ref 2.5–4.5)
PLATELET # BLD AUTO: 121 K/UL (ref 150–450)
PMV BLD AUTO: 9.8 FL (ref 9.4–12.3)
POTASSIUM SERPL-SCNC: 3.4 MMOL/L (ref 3.5–5.1)
RBC # BLD AUTO: 2.94 M/UL (ref 4.05–5.2)
SERVICE CMNT-IMP: ABNORMAL
SERVICE CMNT-IMP: NORMAL
SERVICE CMNT-IMP: NORMAL
SODIUM SERPL-SCNC: 138 MMOL/L (ref 136–145)
WBC # BLD AUTO: 7.9 K/UL (ref 4.3–11.1)

## 2025-04-03 PROCEDURE — 2580000003 HC RX 258: Performed by: STUDENT IN AN ORGANIZED HEALTH CARE EDUCATION/TRAINING PROGRAM

## 2025-04-03 PROCEDURE — 1100000000 HC RM PRIVATE

## 2025-04-03 PROCEDURE — 85025 COMPLETE CBC W/AUTO DIFF WBC: CPT

## 2025-04-03 PROCEDURE — 6360000002 HC RX W HCPCS: Performed by: STUDENT IN AN ORGANIZED HEALTH CARE EDUCATION/TRAINING PROGRAM

## 2025-04-03 PROCEDURE — 6360000002 HC RX W HCPCS: Performed by: FAMILY MEDICINE

## 2025-04-03 PROCEDURE — 84100 ASSAY OF PHOSPHORUS: CPT

## 2025-04-03 PROCEDURE — 83735 ASSAY OF MAGNESIUM: CPT

## 2025-04-03 PROCEDURE — 80048 BASIC METABOLIC PNL TOTAL CA: CPT

## 2025-04-03 PROCEDURE — 6370000000 HC RX 637 (ALT 250 FOR IP): Performed by: STUDENT IN AN ORGANIZED HEALTH CARE EDUCATION/TRAINING PROGRAM

## 2025-04-03 PROCEDURE — 76937 US GUIDE VASCULAR ACCESS: CPT

## 2025-04-03 PROCEDURE — 82962 GLUCOSE BLOOD TEST: CPT

## 2025-04-03 PROCEDURE — 6370000000 HC RX 637 (ALT 250 FOR IP): Performed by: FAMILY MEDICINE

## 2025-04-03 PROCEDURE — 2500000003 HC RX 250 WO HCPCS: Performed by: STUDENT IN AN ORGANIZED HEALTH CARE EDUCATION/TRAINING PROGRAM

## 2025-04-03 PROCEDURE — 2500000003 HC RX 250 WO HCPCS: Performed by: FAMILY MEDICINE

## 2025-04-03 PROCEDURE — 85652 RBC SED RATE AUTOMATED: CPT

## 2025-04-03 PROCEDURE — 97530 THERAPEUTIC ACTIVITIES: CPT

## 2025-04-03 PROCEDURE — 36415 COLL VENOUS BLD VENIPUNCTURE: CPT

## 2025-04-03 RX ORDER — MAGNESIUM SULFATE IN WATER 40 MG/ML
2000 INJECTION, SOLUTION INTRAVENOUS ONCE
Status: COMPLETED | OUTPATIENT
Start: 2025-04-03 | End: 2025-04-03

## 2025-04-03 RX ADMIN — POTASSIUM BICARBONATE 40 MEQ: 782 TABLET, EFFERVESCENT ORAL at 09:57

## 2025-04-03 RX ADMIN — AMLODIPINE BESYLATE 10 MG: 10 TABLET ORAL at 09:37

## 2025-04-03 RX ADMIN — WATER 1000 MG: 1 INJECTION INTRAMUSCULAR; INTRAVENOUS; SUBCUTANEOUS at 17:10

## 2025-04-03 RX ADMIN — POTASSIUM CHLORIDE 40 MEQ: 20 TABLET, EXTENDED RELEASE ORAL at 16:26

## 2025-04-03 RX ADMIN — ROSUVASTATIN CALCIUM 20 MG: 20 TABLET, FILM COATED ORAL at 09:35

## 2025-04-03 RX ADMIN — ASPIRIN 81 MG: 81 TABLET ORAL at 09:36

## 2025-04-03 RX ADMIN — MAGNESIUM SULFATE HEPTAHYDRATE 2000 MG: 40 INJECTION, SOLUTION INTRAVENOUS at 09:44

## 2025-04-03 RX ADMIN — SODIUM CHLORIDE, PRESERVATIVE FREE 10 ML: 5 INJECTION INTRAVENOUS at 09:50

## 2025-04-03 RX ADMIN — METOPROLOL SUCCINATE 50 MG: 50 TABLET, EXTENDED RELEASE ORAL at 09:37

## 2025-04-03 RX ADMIN — SODIUM CHLORIDE, PRESERVATIVE FREE 10 ML: 5 INJECTION INTRAVENOUS at 21:28

## 2025-04-03 RX ADMIN — LATANOPROST 1 DROP: 50 SOLUTION OPHTHALMIC at 21:27

## 2025-04-03 RX ADMIN — LEVOTHYROXINE SODIUM 50 MCG: 0.05 TABLET ORAL at 06:43

## 2025-04-03 RX ADMIN — POTASSIUM PHOSPHATE, MONOBASIC POTASSIUM PHOSPHATE, DIBASIC 15 MMOL: 224; 236 INJECTION, SOLUTION, CONCENTRATE INTRAVENOUS at 09:43

## 2025-04-03 RX ADMIN — FUROSEMIDE 20 MG: 20 TABLET ORAL at 09:35

## 2025-04-03 RX ADMIN — LOSARTAN POTASSIUM 50 MG: 50 TABLET, FILM COATED ORAL at 09:37

## 2025-04-03 NOTE — PROGRESS NOTES
The medications should be crushed and given via apple sauce. Pt is a one to two person assist to the restroom. Hourly rounds performed this shift. Bed lowered and locked. Call light within reach. All needs met at this time.

## 2025-04-03 NOTE — PROGRESS NOTES
Hospitalist Progress Note   Admit Date:  2025 12:52 PM   Name:  Loan Gregory   Age:  84 y.o.  Sex:  female  :  1940   MRN:  [id number]   Room:  [facility]    Presenting/Chief Complaint: Diarrhea     Reason(s) for Admission: Hypokalemia [E87.6]  Hypomagnesemia [E83.42]  General weakness [R53.1]  Diarrhea, unspecified type [R19.7]     Hospital Course:   Loan Gregory is a 84 y.o. female with PMHx of HTN, HLD, DM type II, Hypothyroidism, CKD 3. Recurrent UTIs who presented to the ED for c/o inability to ambulate due to weakness over the past week after she had an episode of uncontrollable diarrhea this past Saturday. Last BM was yesterday. Only new medication has been zantac.      Subjective & 24hr Events:   Patient was laying comfortably in the bed.  Her diarrhea is improving.  Patient denies fever, chills, nausea, vomiting.     Assessment & Plan:     Hypokalemia/Hypomagnesemia -the setting of diarrhea  Potassium improved to 3.4, magnesium was 1.6  Telemonitoring  Repleted potassium with IV and p.o. and repleted magnesium with IV  Follow-up with repeat K and magnesium in a.m.    Hypophosphatemia  Phosphorus was 2.3 and repleted  Follow-up with PCP      Metabolic acidosis  Resolved  Off bicarb drip    Diarrhea, resolved -C. difficile negative.    UTI - Ceftriaxone. Urine culture negative.      Generalized weakness -PT/OT recommended STRDM type II - diet controlled.      HTN - amlodipine, ARB, BB     HLD- statin     Hypothyroidism - Synthroid    Anemia  Hemoglobin is 10.2  Follow-up with anemia panel  Transfuse if hemoglobin is less than 7.     Bilateral leg swelling  Lasix daily     She denies taking glipizide or lyrica anymore     Dispo anticipate hospital stay for 24 hours  Communication discussed with the family at the bedside.  PT/OT recommended STR    PT/OT evals ordered?  Therapy evals ordered  Diet: ADULT DIET; Regular; 4 carb choices (60 gm/meal); Low Fat/Low Chol/High Fiber/2 gm  Oral Daily    metoprolol succinate (TOPROL XL) extended release tablet 50 mg  50 mg Oral Daily    rosuvastatin (CRESTOR) tablet 20 mg  20 mg Oral Daily    cefTRIAXone (ROCEPHIN) 1,000 mg in sterile water 10 mL IV syringe  1,000 mg IntraVENous Q24H       Signed:  Reyna Cervantes MD    Part of this note may have been written by using a voice dictation software.  The note has been proof read but may still contain some grammatical/other typographical errors.

## 2025-04-03 NOTE — PROGRESS NOTES
ACUTE PHYSICAL THERAPY GOALS:   (Developed with and agreed upon by patient and/or caregiver.)  1.) Loan Gregory  will move from supine to sit and sit to supine , scoot up and down, and roll side to side with MODIFIED INDEPENDENCE within 7 treatment day(s).    (2.) Loan Hindst will transfer from bed to chair and chair to bed with MODIFIED INDEPENDENCE using the least restrictive device within 7 treatment day(s).    (3.) Loan Gregory will ambulate with MODIFIED INDEPENDENCE for 150 feet with the least restrictive device within 7 treatment day(s).   (4.) Loan Hindst will perform standing static and dynamic balance activities x 5 minutes with MODIFIED INDEPENDENCE to improve safety within 7 treatment day(s).  (5.) Loan Hindst will ascend and descend 2 stairs using 1 hand rail(s) with MODIFIED INDEPENDENCE to improve functional mobility and safety within 7 treatment day(s).  (6.) Loan Hindst will perform therapeutic exercises x 10 min for HEP with MODIFIED INDEPENDENCE to improve strength, endurance, and functional mobility within 7 treatment day(s).        PHYSICAL THERAPY: Daily Note AM   (Link to Caseload Tracking: PT Visit Days : 2  Time In/Out PT Charge Capture  Rehab Caseload Tracker  Orders    Loan Gregory is a 84 y.o. female   PRIMARY DIAGNOSIS: Hypokalemia  Hypokalemia [E87.6]  Hypomagnesemia [E83.42]  General weakness [R53.1]  Diarrhea, unspecified type [R19.7]       Inpatient: Payor: MEDICARE / Plan: MEDICARE PART A AND B / Product Type: *No Product type* /     ASSESSMENT:     REHAB RECOMMENDATIONS:   Recommendation to date pending progress:  Setting:  Short-term Rehab    Equipment:    To Be Determined     ASSESSMENT:  Ms. Gregory was up in the chair on arrival.  She was agreeable to PT.  Worked on seated therex followed by sit to stands as she states that she has a hard time with them at home.  She did fairly well from the chair in the room but admits

## 2025-04-03 NOTE — CARE COORDINATION
CM met with Ms. Gregory in room 610 to discuss discharge planning.  She is inpatient status for generalized weakness, diarrhea, hypokalemia, and hypomagnesemia.      Prior to this admit, Ms. Gregory was living with her bother Mr. Luis Barrera and his wife Myra in [place], [place] in their one-story house.  Ms. Gregory is letting the daughter of their daughter (Edelmira Santos) live in her old house (grand niece).      There is one step from the patio to enter at her brother and sister in law's house. Ms. Gregory has typically been using a RW to ambulate, and was fairly independent with ADLs.  She said that for the last week or two, she has been weaker.  The family go her a lift chair, and that has helped some. Their daughter, Ms. Edelmira Santos, is her POA and caregiver.  She also has a raised toilet seat.      Ms. Gregory said that [facility] (saw them for back pain) recommended OP PT, but she said that would be a problem with transportation.  If she needs therapy, she would like home health OT and PT.  In the past, she has had home health from [facility] and from [facility] home health.  She was in STR at [facility] about 2 years ago.      Our OT and PT saw her yesterday here.  They are recommending STR at a SNF.  ROGELIO will discuss at IDT rounds this morning, and then go see Ms. Gregory again to see what she wants to do for therapy after she is discharged.      Addendum at 09:45:  CM spoke to Ms. Gregory and her niece Ms. Edelmira Santos in room 610.  They agree to a referral to [facility] for STR.  Referral sent via Epic.      Addendum at 14:48:  CM selected [facility] in Epic.       04/03/25 0572   Service Assessment   Patient Orientation Alert and Oriented;Other (see comment)  ([place])   Cognition Alert   History Provided By Patient   Primary Caregiver Self   Support Systems Family Members   PCP Verified by CM Yes   Prior Functional Level Assistance with the following:;Cooking;Housework;Shopping;Mobility   Current  Functional Level Other (see comment)  (Per OT and PT, STR at a SNF is recommended)   Can patient return to prior living arrangement Unknown at present   Ability to make needs known: Good   Family able to assist with home care needs: Yes   Would you like for me to discuss the discharge plan with any other family members/significant others, and if so, who? No   Condition of Participation: Discharge Planning   The Plan for Transition of Care is related to the following treatment goals: TBD; STR at SNF?

## 2025-04-03 NOTE — PLAN OF CARE
Problem: Chronic Conditions and Co-morbidities  Goal: Patient's chronic conditions and co-morbidity symptoms are monitored and maintained or improved  4/3/2025 0221 by Brynn Diaz RN  Outcome: Progressing  Flowsheets (Taken 4/2/2025 1932)  Care Plan - Patient's Chronic Conditions and Co-Morbidity Symptoms are Monitored and Maintained or Improved:   Monitor and assess patient's chronic conditions and comorbid symptoms for stability, deterioration, or improvement   Collaborate with multidisciplinary team to address chronic and comorbid conditions and prevent exacerbation or deterioration   Update acute care plan with appropriate goals if chronic or comorbid symptoms are exacerbated and prevent overall improvement and discharge  4/2/2025 1239 by Mckinley Woods RN  Outcome: Progressing     Problem: Discharge Planning  Goal: Discharge to home or other facility with appropriate resources  4/3/2025 0221 by Brynn Diaz RN  Outcome: Progressing  Flowsheets (Taken 4/2/2025 1932)  Discharge to home or other facility with appropriate resources:   Identify barriers to discharge with patient and caregiver   Arrange for needed discharge resources and transportation as appropriate   Identify discharge learning needs (meds, wound care, etc)  4/2/2025 1239 by Mckinley Woods, RN  Outcome: Progressing     Problem: Skin/Tissue Integrity  Goal: Skin integrity remains intact  Description: 1.  Monitor for areas of redness and/or skin breakdown  2.  Assess vascular access sites hourly  3.  Every 4-6 hours minimum:  Change oxygen saturation probe site  4.  Every 4-6 hours:  If on nasal continuous positive airway pressure, respiratory therapy assess nares and determine need for appliance change or resting period  4/3/2025 0221 by Brynn Diaz, RN  Outcome: Progressing  4/2/2025 1239 by Mckinley Woods RN  Outcome: Progressing     Problem: ABCDS Injury Assessment  Goal: Absence of physical injury  4/3/2025 0221 by Emily

## 2025-04-03 NOTE — PLAN OF CARE
Problem: Chronic Conditions and Co-morbidities  Goal: Patient's chronic conditions and co-morbidity symptoms are monitored and maintained or improved  4/3/2025 1210 by Mckinley Woods RN  Outcome: Progressing  4/3/2025 0221 by Brynn Diaz RN  Outcome: Progressing  Flowsheets (Taken 4/2/2025 1932)  Care Plan - Patient's Chronic Conditions and Co-Morbidity Symptoms are Monitored and Maintained or Improved:   Monitor and assess patient's chronic conditions and comorbid symptoms for stability, deterioration, or improvement   Collaborate with multidisciplinary team to address chronic and comorbid conditions and prevent exacerbation or deterioration   Update acute care plan with appropriate goals if chronic or comorbid symptoms are exacerbated and prevent overall improvement and discharge     Problem: Discharge Planning  Goal: Discharge to home or other facility with appropriate resources  4/3/2025 1210 by Mckinley Woods RN  Outcome: Progressing  4/3/2025 0221 by Brynn Diaz RN  Outcome: Progressing  Flowsheets (Taken 4/2/2025 1932)  Discharge to home or other facility with appropriate resources:   Identify barriers to discharge with patient and caregiver   Arrange for needed discharge resources and transportation as appropriate   Identify discharge learning needs (meds, wound care, etc)     Problem: Skin/Tissue Integrity  Goal: Skin integrity remains intact  Description: 1.  Monitor for areas of redness and/or skin breakdown  2.  Assess vascular access sites hourly  3.  Every 4-6 hours minimum:  Change oxygen saturation probe site  4.  Every 4-6 hours:  If on nasal continuous positive airway pressure, respiratory therapy assess nares and determine need for appliance change or resting period  4/3/2025 1210 by Mckinley Woods RN  Outcome: Progressing  4/3/2025 0221 by Brynn Diaz RN  Outcome: Progressing     Problem: ABCDS Injury Assessment  Goal: Absence of physical injury  4/3/2025 1210 by Chuck

## 2025-04-03 NOTE — PLAN OF CARE
Problem: Chronic Conditions and Co-morbidities  Goal: Patient's chronic conditions and co-morbidity symptoms are monitored and maintained or improved  4/3/2025 1934 by Davnia Padgett RN  Outcome: Progressing  4/3/2025 1210 by Mckinley Woods RN  Outcome: Progressing     Problem: Discharge Planning  Goal: Discharge to home or other facility with appropriate resources  4/3/2025 1934 by Davina Padgett RN  Outcome: Progressing  4/3/2025 1210 by Mckinley Woods RN  Outcome: Progressing     Problem: Skin/Tissue Integrity  Goal: Skin integrity remains intact  Description: 1.  Monitor for areas of redness and/or skin breakdown  2.  Assess vascular access sites hourly  3.  Every 4-6 hours minimum:  Change oxygen saturation probe site  4.  Every 4-6 hours:  If on nasal continuous positive airway pressure, respiratory therapy assess nares and determine need for appliance change or resting period  4/3/2025 1934 by Davina Padgett RN  Outcome: Progressing  4/3/2025 1210 by Mckinley Woods RN  Outcome: Progressing     Problem: ABCDS Injury Assessment  Goal: Absence of physical injury  4/3/2025 1934 by Davina Padgett RN  Outcome: Progressing  4/3/2025 1210 by Mckinley Woods RN  Outcome: Progressing     Problem: Safety - Adult  Goal: Free from fall injury  4/3/2025 1934 by Davina Padgett RN  Outcome: Progressing  4/3/2025 1210 by Mckinley Woods RN  Outcome: Progressing

## 2025-04-04 VITALS
BODY MASS INDEX: 32.53 KG/M2 | HEART RATE: 59 BPM | RESPIRATION RATE: 18 BRPM | WEIGHT: 172.3 LBS | SYSTOLIC BLOOD PRESSURE: 126 MMHG | TEMPERATURE: 97.7 F | DIASTOLIC BLOOD PRESSURE: 58 MMHG | OXYGEN SATURATION: 96 % | HEIGHT: 61 IN

## 2025-04-04 LAB
ANION GAP SERPL CALC-SCNC: 8 MMOL/L (ref 7–16)
BACTERIA SPEC CULT: NORMAL
BASOPHILS # BLD: 0.04 K/UL (ref 0–0.2)
BASOPHILS NFR BLD: 0.5 % (ref 0–2)
BUN SERPL-MCNC: 7 MG/DL (ref 8–23)
CALCIUM SERPL-MCNC: 7.8 MG/DL (ref 8.8–10.2)
CHLORIDE SERPL-SCNC: 102 MMOL/L (ref 98–107)
CO2 SERPL-SCNC: 25 MMOL/L (ref 20–29)
CREAT SERPL-MCNC: 1.18 MG/DL (ref 0.6–1.1)
DIFFERENTIAL METHOD BLD: ABNORMAL
EOSINOPHIL # BLD: 0.35 K/UL (ref 0–0.8)
EOSINOPHIL NFR BLD: 4.4 % (ref 0.5–7.8)
ERYTHROCYTE [DISTWIDTH] IN BLOOD BY AUTOMATED COUNT: 19.9 % (ref 11.9–14.6)
FERRITIN SERPL-MCNC: 269 NG/ML (ref 8–388)
FOLATE SERPL-MCNC: <2 NG/ML (ref 3.1–17.5)
GLUCOSE BLD STRIP.AUTO-MCNC: 108 MG/DL (ref 65–100)
GLUCOSE BLD STRIP.AUTO-MCNC: 82 MG/DL (ref 65–100)
GLUCOSE SERPL-MCNC: 92 MG/DL (ref 70–99)
HAPTOGLOB SERPL-MCNC: 46 MG/DL (ref 30–200)
HCT VFR BLD AUTO: 31.7 % (ref 35.8–46.3)
HGB BLD-MCNC: 10.5 G/DL (ref 11.7–15.4)
HGB RETIC QN AUTO: 41 PG (ref 29–35)
IMM GRANULOCYTES # BLD AUTO: 0.03 K/UL (ref 0–0.5)
IMM GRANULOCYTES NFR BLD AUTO: 0.4 % (ref 0–5)
IMM RETICS NFR: 12.6 % (ref 3–15.9)
IRON SATN MFR SERPL: 23 % (ref 20–50)
IRON SERPL-MCNC: 29 UG/DL (ref 35–100)
LDH SERPL L TO P-CCNC: 327 U/L (ref 127–281)
LYMPHOCYTES # BLD: 3.95 K/UL (ref 0.5–4.6)
LYMPHOCYTES NFR BLD: 49.4 % (ref 13–44)
MAGNESIUM SERPL-MCNC: 1.9 MG/DL (ref 1.8–2.4)
MCH RBC QN AUTO: 35.2 PG (ref 26.1–32.9)
MCHC RBC AUTO-ENTMCNC: 33.1 G/DL (ref 31.4–35)
MCV RBC AUTO: 106.4 FL (ref 82–102)
MONOCYTES # BLD: 0.91 K/UL (ref 0.1–1.3)
MONOCYTES NFR BLD: 11.4 % (ref 4–12)
NEUTS SEG # BLD: 2.72 K/UL (ref 1.7–8.2)
NEUTS SEG NFR BLD: 33.9 % (ref 43–78)
NRBC # BLD: 0 K/UL (ref 0–0.2)
PHOSPHATE SERPL-MCNC: 3.1 MG/DL (ref 2.5–4.5)
PLATELET # BLD AUTO: 122 K/UL (ref 150–450)
PMV BLD AUTO: 10.1 FL (ref 9.4–12.3)
POTASSIUM SERPL-SCNC: 4.7 MMOL/L (ref 3.5–5.1)
RBC # BLD AUTO: 2.98 M/UL (ref 4.05–5.2)
RETICS # AUTO: 0.08 M/UL (ref 0.03–0.1)
RETICS/RBC NFR AUTO: 2.9 % (ref 0.3–2)
SERVICE CMNT-IMP: ABNORMAL
SERVICE CMNT-IMP: NORMAL
SERVICE CMNT-IMP: NORMAL
SODIUM SERPL-SCNC: 135 MMOL/L (ref 136–145)
TIBC SERPL-MCNC: 123 UG/DL (ref 240–450)
UIBC SERPL-MCNC: 94.3 UG/DL (ref 112–347)
VIT B12 SERPL-MCNC: 571 PG/ML (ref 193–986)
WBC # BLD AUTO: 8 K/UL (ref 4.3–11.1)

## 2025-04-04 PROCEDURE — 80048 BASIC METABOLIC PNL TOTAL CA: CPT

## 2025-04-04 PROCEDURE — 6370000000 HC RX 637 (ALT 250 FOR IP): Performed by: FAMILY MEDICINE

## 2025-04-04 PROCEDURE — 2500000003 HC RX 250 WO HCPCS: Performed by: FAMILY MEDICINE

## 2025-04-04 PROCEDURE — 85025 COMPLETE CBC W/AUTO DIFF WBC: CPT

## 2025-04-04 PROCEDURE — 6370000000 HC RX 637 (ALT 250 FOR IP): Performed by: STUDENT IN AN ORGANIZED HEALTH CARE EDUCATION/TRAINING PROGRAM

## 2025-04-04 PROCEDURE — 82962 GLUCOSE BLOOD TEST: CPT

## 2025-04-04 PROCEDURE — 83010 ASSAY OF HAPTOGLOBIN QUANT: CPT

## 2025-04-04 PROCEDURE — 83550 IRON BINDING TEST: CPT

## 2025-04-04 PROCEDURE — 83615 LACTATE (LD) (LDH) ENZYME: CPT

## 2025-04-04 PROCEDURE — 83540 ASSAY OF IRON: CPT

## 2025-04-04 PROCEDURE — 97535 SELF CARE MNGMENT TRAINING: CPT

## 2025-04-04 PROCEDURE — 85046 RETICYTE/HGB CONCENTRATE: CPT

## 2025-04-04 PROCEDURE — 82607 VITAMIN B-12: CPT

## 2025-04-04 PROCEDURE — 36415 COLL VENOUS BLD VENIPUNCTURE: CPT

## 2025-04-04 PROCEDURE — 82746 ASSAY OF FOLIC ACID SERUM: CPT

## 2025-04-04 PROCEDURE — 84100 ASSAY OF PHOSPHORUS: CPT

## 2025-04-04 PROCEDURE — 82728 ASSAY OF FERRITIN: CPT

## 2025-04-04 PROCEDURE — 83735 ASSAY OF MAGNESIUM: CPT

## 2025-04-04 RX ORDER — FOLIC ACID 1 MG/1
1 TABLET ORAL DAILY
Status: DISCONTINUED | OUTPATIENT
Start: 2025-04-04 | End: 2025-04-04 | Stop reason: HOSPADM

## 2025-04-04 RX ORDER — FOLIC ACID 1 MG/1
1 TABLET ORAL DAILY
Qty: 30 TABLET | Refills: 3 | Status: SHIPPED | OUTPATIENT
Start: 2025-04-05

## 2025-04-04 RX ADMIN — ROSUVASTATIN CALCIUM 20 MG: 20 TABLET, FILM COATED ORAL at 08:23

## 2025-04-04 RX ADMIN — METOPROLOL SUCCINATE 50 MG: 50 TABLET, EXTENDED RELEASE ORAL at 08:23

## 2025-04-04 RX ADMIN — FUROSEMIDE 20 MG: 20 TABLET ORAL at 08:23

## 2025-04-04 RX ADMIN — SODIUM CHLORIDE, PRESERVATIVE FREE 10 ML: 5 INJECTION INTRAVENOUS at 08:23

## 2025-04-04 RX ADMIN — LEVOTHYROXINE SODIUM 50 MCG: 0.05 TABLET ORAL at 05:49

## 2025-04-04 RX ADMIN — POTASSIUM CHLORIDE 40 MEQ: 20 TABLET, EXTENDED RELEASE ORAL at 08:20

## 2025-04-04 RX ADMIN — ASPIRIN 81 MG: 81 TABLET ORAL at 08:23

## 2025-04-04 RX ADMIN — FOLIC ACID 1 MG: 1 TABLET ORAL at 10:12

## 2025-04-04 RX ADMIN — AMLODIPINE BESYLATE 10 MG: 10 TABLET ORAL at 08:23

## 2025-04-04 RX ADMIN — LOSARTAN POTASSIUM 50 MG: 50 TABLET, FILM COATED ORAL at 08:22

## 2025-04-04 NOTE — CARE COORDINATION
Discharge note:  [facility] ambulance transport arragned for 2 pm to [facility] for STR, room 326A, report [phone].  MsJose Luis Colt and her niece Ms. Edelmira Tom in room 610 is in agreement with this plan, as well as Alyssa MARQUEZ.

## 2025-04-04 NOTE — PLAN OF CARE
Problem: Chronic Conditions and Co-morbidities  Goal: Patient's chronic conditions and co-morbidity symptoms are monitored and maintained or improved  Outcome: Progressing     Problem: Discharge Planning  Goal: Discharge to home or other facility with appropriate resources  Outcome: Progressing     Problem: Skin/Tissue Integrity  Goal: Skin integrity remains intact  Description: 1.  Monitor for areas of redness and/or skin breakdown  2.  Assess vascular access sites hourly  3.  Every 4-6 hours minimum:  Change oxygen saturation probe site  4.  Every 4-6 hours:  If on nasal continuous positive airway pressure, respiratory therapy assess nares and determine need for appliance change or resting period  Outcome: Progressing     Problem: ABCDS Injury Assessment  Goal: Absence of physical injury  Outcome: Progressing

## 2025-04-04 NOTE — PROGRESS NOTES
ACUTE OCCUPATIONAL THERAPY GOALS:   (Developed with and agreed upon by patient and/or caregiver.)  1. Pt will toilet with SBA   2. Pt will complete functional mobility for ADLs with SBA using AD as needed  3. Pt will complete lower body dressing with SBA using AE as needed  4. Pt will complete grooming and hygiene at sink with SBA  5. Pt will tolerate 23 minutes functional activity with 1-2  rest breaks to promote increased endurance for ADLs     Timeframe: 7 visits    OCCUPATIONAL THERAPY: Daily Note AM   OT Visit Days: 2   Time In/Out  OT Charge Capture  Rehab Caseload Tracker  OT Orders    Falls risk    Loan Gregory is a 84 y.o. female   PRIMARY DIAGNOSIS: Hypokalemia  Hypokalemia [E87.6]  Hypomagnesemia [E83.42]  General weakness [R53.1]  Diarrhea, unspecified type [R19.7]       Inpatient: Payor: MEDICARE / Plan: MEDICARE PART A AND B / Product Type: *No Product type* /     ASSESSMENT:     REHAB RECOMMENDATIONS:   Recommendation to date pending progress:  Setting:  Short-term Rehab    Equipment:    To Be Determined     ASSESSMENT:  Ms. Gregory found in poor position for eating, so repositioned up in bed and HOB elevated tray table across and pt left opening packages and self feeding. OT returned later and pt agreeable to full ADL for toileting and bathing. Purewick and pull up brief in place. Min A bed mobility with additional time. Good static sitting balance and pt able to complete sponge bath from edge of bed with fair+ dynamic balance and additional time. OT completed feet as pt could not reach them. Pt able to apply lotion to all except her feet.  Donned clean gown with OT getting up over her shoulders and tying. OT applied shampoo cap and due to pt's limited B shoulder ROM, OT washed and combed pt's hair. Pt able to chloe CROCs over her socks with setup. Sit to stand to rolling walker with cues for technique and pt ambulated into bathroom with CGA. CGA for pulling down underwear, sat to toilet and

## 2025-04-04 NOTE — DISCHARGE SUMMARY
ophthalmic solution Apply 1 drop to eye nightly      levothyroxine (SYNTHROID) 25 MCG tablet Take 1 tablet by mouth every morning (before breakfast) Take 0.5 tablets (25 mcg) by mouth daily. Take on an empty stomach and wait one hour before eating or taking additional medication.      pregabalin (LYRICA) 50 MG capsule Take 1 capsule by mouth daily.           STOP taking these medications       amLODIPine (NORVASC) 5 MG tablet Comments:   Reason for Stopping:         Multiple Vitamins-Minerals (PRESERVISION AREDS 2 PO) Comments:   Reason for Stopping:         cetirizine (ZYRTEC) 10 MG tablet Comments:   Reason for Stopping:         cyanocobalamin 1000 MCG tablet Comments:   Reason for Stopping:         docusate (COLACE, DULCOLAX) 100 MG CAPS Comments:   Reason for Stopping:         glipiZIDE (GLUCOTROL XL) 2.5 MG extended release tablet Comments:   Reason for Stopping:         omeprazole (PRILOSEC) 10 MG delayed release capsule Comments:   Reason for Stopping:               Some of the medications may be marked as \"stop taking\" by the system; but in reality patient or family reported already being off these meds; defer to outpatient/prescribing providers.      Procedures done this admission:  * No surgery found *    Consults this admission:  IP CONSULT TO VASCULAR ACCESS TEAM    Consultants will arrange their own follow ups, if applicable.    Echocardiogram results:  05/06/24    ECHO (TTE) COMPLETE (PRN CONTRAST/BUBBLE/STRAIN/3D) 05/07/2024  9:45 AM (Final)    Interpretation Summary    Left Ventricle: Normal left ventricular systolic function with a visually estimated EF of 55 - 60%. Left ventricle size is normal. Severe basal septal thickening. Moderate posterior thickening. Normal wall motion. Abnormal diastolic function. Echocardiographic features are suggestive of hypertrophic cardiomyopathy. LVOT gradient at rest 79 mmHg, does not increase with Valsalva.    Aortic Valve: Mild sclerosis of the aortic valve  minute  O2 Device: None (Room air)    Estimated body mass index is 32.56 kg/m² as calculated from the following:    Height as of this encounter: 1.549 m (5' 1\").    Weight as of this encounter: 78.2 kg (172 lb 4.8 oz).    Intake/Output Summary (Last 24 hours) at 4/4/2025 1158  Last data filed at 4/4/2025 0607  Gross per 24 hour   Intake 320 ml   Output 2600 ml   Net -2280 ml         Physical Exam:    General:    Well nourished.  No overt distress  Head:  Normocephalic, atraumatic  Eyes:  Sclerae appear normal.  Pupils equally round.    HENT:  Nares appear normal, no drainage.  Moist mucous membranes  Neck:  No restricted ROM.  Trachea midline  CV:   RRR.  No m/r/g.  No JVD  Lungs:   CTAB.  No wheezing, rhonchi, or rales.  Respirations even, unlabored  Abdomen:   Soft, nontender, nondistended.    Extremities: Warm and dry.   No edema.    Skin:     No rashes.  Normal coloration  Neuro:  CN II-XII grossly intact.  Psych:  Normal mood and affect.        Time spent in patient discharge and coordination 39 minutes.      Signed:  Reyna Cervantes MD    Part of this note may have been written by using a voice dictation software.  The note has been proof read but may still contain some grammatical/other typographical errors.

## 2025-04-29 ENCOUNTER — CLINICAL DOCUMENTATION (OUTPATIENT)
Dept: ORTHOPEDIC SURGERY | Age: 85
End: 2025-04-29